# Patient Record
Sex: MALE | Race: BLACK OR AFRICAN AMERICAN | Employment: OTHER | URBAN - METROPOLITAN AREA
[De-identification: names, ages, dates, MRNs, and addresses within clinical notes are randomized per-mention and may not be internally consistent; named-entity substitution may affect disease eponyms.]

---

## 2017-02-15 ENCOUNTER — OP HISTORICAL/CONVERTED ENCOUNTER (OUTPATIENT)
Dept: OTHER | Age: 77
End: 2017-02-15

## 2017-03-09 ENCOUNTER — TELEPHONE (OUTPATIENT)
Dept: ENDOCRINOLOGY | Age: 77
End: 2017-03-09

## 2017-03-09 NOTE — TELEPHONE ENCOUNTER
You do know that we do not treat him for DM 2 right Love Bernardoivener     He has to come with meter, all meds etc., as it will be a consultation for new diagnosis diabetes

## 2017-03-09 NOTE — TELEPHONE ENCOUNTER
Pt. Wife called stating that he was taken to the hospital in February over night because his blood sugar dropped so low. She said that it went up to 200 and then over 400 this past week, but then will drop down to around 40 over night. He is scheduled for May 9th right now, but wife would like to know if he could come in sooner.

## 2017-03-21 NOTE — TELEPHONE ENCOUNTER
----- Message from Nury Salgado sent at 3/21/2017 12:17 PM EDT -----  Regarding: Dr. Raul Boyd  Pt is requesting a call back from the doctor to see where he can get a sleep study test done at. Pt wants to know is it through the doctor, or does he have to go somewhere else.  Pt can best be contacted at [phone]

## 2017-03-22 NOTE — TELEPHONE ENCOUNTER
Can he get it done at a [facility], or outside? Are you the one to order it, or does he need to get the order from his PCP??     He is scheduled for 3.27.17, if you would just like to address it then

## 2017-03-28 ENCOUNTER — OFFICE VISIT (OUTPATIENT)
Dept: ENDOCRINOLOGY | Age: 77
End: 2017-03-28

## 2017-03-28 ENCOUNTER — TELEPHONE (OUTPATIENT)
Dept: ENDOCRINOLOGY | Age: 77
End: 2017-03-28

## 2017-03-28 VITALS
OXYGEN SATURATION: 98 % | HEIGHT: 65 IN | SYSTOLIC BLOOD PRESSURE: 128 MMHG | TEMPERATURE: 96.6 F | WEIGHT: 142.2 LBS | HEART RATE: 65 BPM | RESPIRATION RATE: 18 BRPM | DIASTOLIC BLOOD PRESSURE: 75 MMHG | BODY MASS INDEX: 23.69 KG/M2

## 2017-03-28 DIAGNOSIS — E78.2 MIXED HYPERLIPIDEMIA: ICD-10-CM

## 2017-03-28 DIAGNOSIS — I10 ESSENTIAL HYPERTENSION: ICD-10-CM

## 2017-03-28 DIAGNOSIS — E04.9 GOITER: ICD-10-CM

## 2017-03-28 LAB
GLUCOSE POC: NORMAL MG/DL
HBA1C MFR BLD HPLC: 9.9 %

## 2017-03-28 RX ORDER — INSULIN ASPART 100 [IU]/ML
INJECTION, SOLUTION INTRAVENOUS; SUBCUTANEOUS
Qty: 10 ML | Refills: 0 | Status: SHIPPED | COMMUNITY
Start: 2017-03-28 | End: 2017-04-21 | Stop reason: ALTCHOICE

## 2017-03-28 RX ORDER — NATEGLINIDE 60 MG/1
60 TABLET ORAL
Qty: 90 TAB | Refills: 6 | Status: SHIPPED | OUTPATIENT
Start: 2017-03-28 | End: 2017-04-21 | Stop reason: ALTCHOICE

## 2017-03-28 RX ORDER — GLIMEPIRIDE 2 MG/1
2 TABLET ORAL
Qty: 30 TAB | Refills: 6 | Status: SHIPPED | OUTPATIENT
Start: 2017-03-28 | End: 2017-04-21 | Stop reason: ALTCHOICE

## 2017-03-28 RX ORDER — GLIMEPIRIDE 1 MG/1
1 TABLET ORAL
Qty: 30 TAB | Refills: 6 | Status: SHIPPED | OUTPATIENT
Start: 2017-03-28 | End: 2017-04-21 | Stop reason: ALTCHOICE

## 2017-03-28 NOTE — MR AVS SNAPSHOT
Visit Information Date & Time Provider Department Dept. Phone Encounter #  
 3/28/2017 11:30 AM Ovi Garcia MD [facility] Diabetes & Endocrinology [phone] [id number] Follow-up Instructions Return in about 4 weeks (around 4/25/2017). Upcoming Health Maintenance Date Due DTaP/Tdap/Td series (1 - Tdap) 11/13/2011 MEDICARE YEARLY EXAM 3/19/2016 HEMOGLOBIN A1C Q6M 6/4/2016 INFLUENZA AGE 9 TO ADULT 8/1/2016 EYE EXAM RETINAL OR DILATED Q1 9/8/2016 FOOT EXAM Q1 11/23/2016 MICROALBUMIN Q1 12/4/2016 LIPID PANEL Q1 12/4/2016 GLAUCOMA SCREENING Q2Y 9/8/2017 Pneumococcal 65+ Low/Medium Risk (2 of 2 - PPSV23) 11/1/2018 COLONOSCOPY 8/1/2024 Allergies as of 3/28/2017  Review Complete On: 3/28/2017 By: Ovi Garcia MD  
  
 Severity Noted Reaction Type Reactions Ace Inhibitors  04/20/2010   Side Effect Cough Current Immunizations  Reviewed on 4/15/2011 Name Date Influenza High Dose Vaccine PF 11/16/2015 Influenza Vaccine 10/1/2014, 11/1/2013 Pneumococcal Vaccine (Unspecified Type) 11/1/2013 Td 11/12/2011 Not reviewed this visit You Were Diagnosed With   
  
 Codes Comments Uncontrolled diabetes mellitus type 2 without complications, unspecified long term insulin use status ([facility]    -  Primary ICD-10-CM: E11.65 ICD-9-CM: 250.02 Vitals BP Pulse Temp Resp Height(growth percentile) Weight(growth percentile) 128/75 (BP 1 Location: Left arm, BP Patient Position: Sitting) 65 96.6 °F (35.9 °C) (Oral) 18 5' 5\" (1.651 m) 142 lb 3.2 oz (64.5 kg) SpO2 BMI Smoking Status 98% 23.66 kg/m2 Never Smoker BMI and BSA Data Body Mass Index Body Surface Area  
 23.66 kg/m 2 1.72 m 2 Preferred Pharmacy Pharmacy Name Phone [id number] [address] AT [place] [address] [phone] Your Updated Medication List  
  
   
 This list is accurate as of: 3/28/17  1:29 PM.  Always use your most recent med list.  
  
  
  
  
 folic acid 1 mg tablet Commonly known as:  Google Take 1 Tab by mouth daily. glucose blood VI test strips strip Commonly known as:  Ascensia CONTOUR Use as directed to test blood sugar 3 times daily - DX-Dm-250.00 Insulin Needles (Disposable) 29 gauge x 1/2\" Ndle Commonly known as:  PEN NEEDLE, DIABETIC  
1 Package by Does Not Apply route two (2) times a day. Lancets Misc Contour lancets- test blood sugar 3 times daily  
  
 losartan 50 mg tablet Commonly known as:  COZAAR Take 1 Tab by mouth daily. lovastatin 40 mg tablet Commonly known as:  MEVACOR  
TAKE 1 TABLET NIGHTLY  
  
 metFORMIN 1,000 mg tablet Commonly known as:  GLUCOPHAGE Take 1 Tab by mouth two (2) times a day. tamsulosin 0.4 mg capsule Commonly known as:  FLOMAX Take 1 Cap by mouth nightly. We Performed the Following AMB POC GLUCOSE, QUANTITATIVE, BLOOD [85699 CPT(R)] AMB POC HEMOGLOBIN A1C [95285 CPT(R)] Follow-up Instructions Return in about 4 weeks (around 4/25/2017). Patient Instructions Check blood sugars immediately before each meal and at bedtime Take  Amaryl   2+1  Mg   at bed time ( buy it ) Take Starlix insulin 60  Mg   before breakfast, before lunch and  before dinner. ( thru insurance ) Less than 70 mg NO medications for diabetes [address] Do not skip meals Do not eat in between meals Reduce carbs- pasta, rice, potatoes, bread Do not drink juices or sodas Donot eat peanut butter Do not eat sugar free cookies and cakes Do not eat peaches , oranges, pineapples, grapes and raisins Introducing [facility] & HEALTH SERVICES!    
 Gabriela King introduces [employer] patient portal. Now you can access parts of your medical record, email your doctor's office, and request medication refills online. 1. In your internet browser, go to [email or web address] 2. Click on the First Time User? Click Here link in the Sign In box. You will see the New Member Sign Up page. 3. Enter your Wizzgo Access Code exactly as it appears below. You will not need to use this code after youve completed the sign-up process. If you do not sign up before the expiration date, you must request a new code. · Wizzgo Access Code: [email or web address] Expires: 6/26/2017  1:29 PM 
 
4. Enter the last four digits of your Social Security Number (xxxx) and Date of Birth (mm/dd/yyyy) as indicated and click Submit. You will be taken to the next sign-up page. 5. Create a Wizzgo ID. This will be your Wizzgo login ID and cannot be changed, so think of one that is secure and easy to remember. 6. Create a Wizzgo password. You can change your password at any time. 7. Enter your Password Reset Question and Answer. This can be used at a later time if you forget your password. 8. Enter your e-mail address. You will receive e-mail notification when new information is available in [address] 9. Click Sign Up. You can now view and download portions of your medical record. 10. Click the Download Summary menu link to download a portable copy of your medical information. If you have questions, please visit the Frequently Asked Questions section of the Wizzgo website. Remember, Wizzgo is NOT to be used for urgent needs. For medical emergencies, dial 911. Now available from your iPhone and Android! Please provide this summary of care documentation to your next provider. Your primary care clinician is listed as Meliza Noe. If you have any questions after today's visit, please call [phone].

## 2017-03-28 NOTE — PATIENT INSTRUCTIONS
Check blood sugars immediately before each meal and at bedtime      Take  Amaryl   2+1  Mg   at bed time ( buy it )    Take Starlix insulin 60  Mg   before breakfast, before lunch and  before dinner.  ( thru insurance )     Less than 70 mg NO medications for diabetes          STOP  TOUJEO   AND   JARDIANCE   STOP ELAVIL         Do not skip meals  Do not eat in between meals    Reduce carbs- pasta, rice, potatoes, bread   Do not drink juices or sodas  Donot eat peanut butter     Do not eat sugar free cookies and cakes   Do not eat peaches , oranges, pineapples, grapes and raisins

## 2017-03-28 NOTE — PROGRESS NOTES
HISTORY OF PRESENT ILLNESS  Swetah Rock is a 78 y.o. male. HPI   Initial visit for DM  2  Management   Referred : by self    H/o diabetes for several  years   Accompanied by wife who is providing the history     Pt in general has drowsiness,   Per WIFE   He was given elavil 50 mg the night before the hospitalization  Current A1C is 9.9 % and symptoms/problems include polyuria, polydipsia and visual disturbances     Current diabetic medications include metformin. When discharged from hospital  On feb 16 2017  , he was told to take only Metformin 1 tab bid   He was told to not to take Toujeo  And jardiance     Current monitoring regimen: home blood tests - 3-4 times a day   Home blood sugar records: trend: fluctuating a lot  Any episodes of hypoglycemia? yes - multiple soon after Toujeo is taken   He has taken 40 units     Weight trend: stable  Prior visit with dietician: no  Current diet: \"unhealthy\" diet in general  Current exercise: no regular exercise    Known diabetic complications: peripheral neuropathy and cerebrovascular disease  Cardiovascular risk factors: dyslipidemia, diabetes mellitus, obesity, male gender, hypertension, stress    Eye exam current (within one year): yes  LUCIANA: no     Past Medical History:   Diagnosis Date    Cataract     left eye cataract surg    Chronic pain     copd     error    Diabetes ([address]     Hammer toe     HLD (hyperlipidemia) 4/22/2010    Hypertension     Other ill-defined conditions(799.89)     CLULITIS BILATERAL  FEET    Other ill-defined conditions(799.89)     BILATERAL GLAUCOMA     Past Surgical History:   Procedure Laterality Date    HX OTHER SURGICAL      CYSTS LT ARM FORHEAD AND RT UPPER QUADRANT    HX UVULOPALATOPHARYNGOPLASTY       Current Outpatient Prescriptions   Medication Sig    lovastatin (MEVACOR) 40 mg tablet TAKE 1 TABLET NIGHTLY    losartan (COZAAR) 50 mg tablet Take 1 Tab by mouth daily.     folic acid (FOLVITE) 1 mg tablet Take 1 Tab by mouth daily.  metFORMIN (GLUCOPHAGE) 1,000 mg tablet Take 1 Tab by mouth two (2) times a day.  glucose blood VI test strips (ASCENSIA CONTOUR) strip Use as directed to test blood sugar 3 times daily - DX-Dm-250.00    Lancets misc Contour lancets- test blood sugar 3 times daily    Insulin Needles, Disposable, (INSULIN PEN NEEDLE) 29 x 1/2 \" ndle 1 Package by Does Not Apply route two (2) times a day.  glimepiride (AMARYL) 2 mg tablet Take 1 Tab by mouth nightly.  glimepiride (AMARYL) 1 mg tablet Take 1 Tab by mouth nightly.  nateglinide (STARLIX) 60 mg tablet Take 1 Tab by mouth Before breakfast, lunch, and dinner.  insulin aspart (NOVOLOG) 100 unit/mL injection Sample    tamsulosin (FLOMAX) 0.4 mg capsule Take 1 Cap by mouth nightly. No current facility-administered medications for this visit. Review of Systems   Constitutional: Negative. HENT:        He does have swallowing difficulties    Eyes: Negative for pain and redness. Respiratory: Negative. Cardiovascular: Negative for chest pain, palpitations and leg swelling. Gastrointestinal: Negative. Negative for constipation. Genitourinary: Negative. Musculoskeletal: Negative for myalgias. Skin: Negative. Neurological: Negative. Endo/Heme/Allergies: Negative. Psychiatric/Behavioral: Negative for depression and memory loss. The patient does not have insomnia. Physical Exam   Constitutional: He is oriented to person, place, and time. He appears well-developed and well-nourished. HENT:   Head: Normocephalic. Eyes: Conjunctivae and EOM are normal. Pupils are equal, round, and reactive to light. Neck: Normal range of motion. Neck supple. No JVD present. No tracheal deviation present. Thyromegaly present. Cardiovascular: Normal rate, regular rhythm and normal heart sounds. Pulmonary/Chest: Effort normal and breath sounds normal.   Abdominal: Soft.  Bowel sounds are normal.   Musculoskeletal: Normal range of motion. Lymphadenopathy:     He has no cervical adenopathy. Neurological: He is alert and oriented to person, place, and time. He has normal reflexes. Skin: Skin is warm. Psychiatric: He has a normal mood and affect. ASSESSMENT and PLAN    1. Type 2 DM poorly controlled : a1c is  9.9 %   From today by fingerstick in office  Reviewed the glucose log : noticed hypoglycemias     STOPPING TOUJEO, will consider Levemir or Lantus for future   Starting on Amaryl and starlix , medication trial     Patient is advised to check blood sugars 4 times daily by rotation method. reviewed medications and side effects in detail  lab results and schedule of future lab studies reviewed with patient    specific diabetic recommendations: diabetic diet discussed in detail, written exchange diet given, low cholesterol diet, weight control and daily exercise discussed, home glucose monitoring emphasized, all medications, side effects and compliance discussed carefully, use and side effects of insulin is taught, foot care discussed and Podiatry visits discussed, annual eye examinations at Ophthalmology discussed, glycohemoglobin and other lab monitoring discussed and long term diabetic complications discussed    2. Hypoglycemia :  Educated on treating the hypoglycemia. 3. HTN : continue cozaar 50 mg . Patient is educated about importance of compliance with anti-hypertensives especially ARB/ACEI    4. Dyslipidemia : continue Mevacor 40 mg hs . Patient is educated about benefits and adverse effects of statins and explained how benefits outweigh risk. 5. use of aspirin to prevent MI and TIA's discussed      6.   Multi nodular goiter :   Thyroid usg - dec 2015 , Left side he has 3 nodules,  1.5 cm dominant nodule and then one mid level 1.2 cm and one 0.6 cm on upper pole   Right side upper pole it is 1.2 cm   He needs a f/u usg       > 50 % of time is spent on counseling

## 2017-03-28 NOTE — PROGRESS NOTES
Lab Results   Component Value Date/Time    Hemoglobin A1c 9.6 08/04/2015 06:59 AM    Hemoglobin A1c (POC) 8.4 11/23/2015 04:11 PM    Hemoglobin A1c, External 9.4 12/04/2015 11:36 AM     Wt Readings from Last 3 Encounters:   03/28/17 142 lb 3.2 oz (64.5 kg)   05/18/16 146 lb (66.2 kg)   03/09/16 152 lb (68.9 kg)     Temp Readings from Last 3 Encounters:   03/28/17 96.6 °F (35.9 °C) (Oral)   05/18/16 97.1 °F (36.2 °C) (Oral)   03/09/16 97 °F (36.1 °C) (Oral)     BP Readings from Last 3 Encounters:   03/28/17 128/75   05/18/16 128/61   03/09/16 127/63     Pulse Readings from Last 3 Encounters:   03/28/17 65   05/18/16 74   03/09/16 74   Foot Exam: yes  Eye Exam: Yes

## 2017-03-29 ENCOUNTER — TELEPHONE (OUTPATIENT)
Dept: ENDOCRINOLOGY | Age: 77
End: 2017-03-29

## 2017-03-29 NOTE — TELEPHONE ENCOUNTER
Patients wife called stated the medications given yesterday need to be called in per the pharmacist, can not purchase over the counter.  Thank you

## 2017-03-30 RX ORDER — LANCING DEVICE
EACH MISCELLANEOUS
Qty: 1 EACH | Refills: 1 | Status: SHIPPED | OUTPATIENT
Start: 2017-03-30

## 2017-04-03 NOTE — TELEPHONE ENCOUNTER
Pt called again again about prescriptions to go to [facility].  Not sure if PA or  Generic insulin is needed  Medicare

## 2017-04-04 NOTE — TELEPHONE ENCOUNTER
Patient says insulin has been changed. Patient says he is not sure name of medication. Please call patient with an update.

## 2017-04-14 ENCOUNTER — TELEPHONE (OUTPATIENT)
Dept: ENDOCRINOLOGY | Age: 77
End: 2017-04-14

## 2017-04-14 NOTE — TELEPHONE ENCOUNTER
Spoke with patient's wife (HIPPA verified). She stated when she went to get medications filled insurance was ran and was told was not able to buy any of the medication. She stated his blood sugars continued to be elevated even after speaking with us so he stopped taking Amaryl and Starlix and restarted Metformin and Toujeo 20 units in the morning and 20 units in the evening. His blood sugars are running very low now. BD 38 BB37 BL43. Wife stated she thinks the meter maybe wrong because he does not act like is BG is that low when it reads that low. She made an appointment for Friday April 21 at 1:30 and will be accompanying him.

## 2017-04-21 ENCOUNTER — OFFICE VISIT (OUTPATIENT)
Dept: ENDOCRINOLOGY | Age: 77
End: 2017-04-21

## 2017-04-21 ENCOUNTER — HOSPITAL ENCOUNTER (OUTPATIENT)
Dept: LAB | Age: 77
Discharge: HOME | End: 2017-04-21
Payer: MEDICARE

## 2017-04-21 VITALS
OXYGEN SATURATION: 99 % | SYSTOLIC BLOOD PRESSURE: 125 MMHG | BODY MASS INDEX: 24.49 KG/M2 | HEART RATE: 70 BPM | DIASTOLIC BLOOD PRESSURE: 63 MMHG | RESPIRATION RATE: 18 BRPM | HEIGHT: 65 IN | TEMPERATURE: 96.6 F | WEIGHT: 147 LBS

## 2017-04-21 DIAGNOSIS — E78.2 MIXED HYPERLIPIDEMIA: ICD-10-CM

## 2017-04-21 DIAGNOSIS — E11.65 TYPE 2 DIABETES MELLITUS WITH HYPERGLYCEMIA, UNSPECIFIED LONG TERM INSULIN USE STATUS: Primary | ICD-10-CM

## 2017-04-21 DIAGNOSIS — E04.9 GOITER: ICD-10-CM

## 2017-04-21 DIAGNOSIS — I10 ESSENTIAL HYPERTENSION: ICD-10-CM

## 2017-04-21 PROCEDURE — 83516 IMMUNOASSAY NONANTIBODY: CPT

## 2017-04-21 PROCEDURE — 83036 HEMOGLOBIN GLYCOSYLATED A1C: CPT

## 2017-04-21 PROCEDURE — 36415 COLL VENOUS BLD VENIPUNCTURE: CPT

## 2017-04-21 PROCEDURE — 80061 LIPID PANEL: CPT

## 2017-04-21 PROCEDURE — 84681 ASSAY OF C-PEPTIDE: CPT

## 2017-04-21 PROCEDURE — 80053 COMPREHEN METABOLIC PANEL: CPT

## 2017-04-21 RX ORDER — INSULIN GLARGINE 100 [IU]/ML
INJECTION, SOLUTION SUBCUTANEOUS
Qty: 15 ML | Refills: 6 | Status: SHIPPED | OUTPATIENT
Start: 2017-04-21 | End: 2017-11-28 | Stop reason: SDUPTHER

## 2017-04-21 RX ORDER — INSULIN LISPRO 100 [IU]/ML
INJECTION, SOLUTION INTRAVENOUS; SUBCUTANEOUS
Qty: 15 ML | Refills: 6 | Status: SHIPPED | OUTPATIENT
Start: 2017-04-21 | End: 2017-11-28 | Stop reason: SDUPTHER

## 2017-04-21 RX ORDER — IBUPROFEN 800 MG/1
TABLET ORAL
Refills: 3 | Status: ON HOLD | COMMUNITY
Start: 2017-02-04 | End: 2017-08-31

## 2017-04-21 RX ORDER — INSULIN GLARGINE 300 U/ML
20 INJECTION, SOLUTION SUBCUTANEOUS DAILY
Refills: 3 | COMMUNITY
Start: 2017-03-09 | End: 2017-04-21 | Stop reason: ALTCHOICE

## 2017-04-21 RX ORDER — OMEPRAZOLE 40 MG/1
CAPSULE, DELAYED RELEASE ORAL
Refills: 3 | COMMUNITY
Start: 2017-02-27

## 2017-04-21 NOTE — MR AVS SNAPSHOT
Visit Information Date & Time Provider Department Dept. Phone Encounter #  
 4/21/2017  1:30 PM Carmel Moss MD [facility] Diabetes & Endocrinology [phone] [id number] Upcoming Health Maintenance Date Due DTaP/Tdap/Td series (1 - Tdap) 11/13/2011 MEDICARE YEARLY EXAM 3/19/2016 INFLUENZA AGE 9 TO ADULT 8/1/2016 EYE EXAM RETINAL OR DILATED Q1 9/8/2016 FOOT EXAM Q1 11/23/2016 MICROALBUMIN Q1 12/4/2016 LIPID PANEL Q1 12/4/2016 GLAUCOMA SCREENING Q2Y 9/8/2017 HEMOGLOBIN A1C Q6M 9/28/2017 Pneumococcal 65+ Low/Medium Risk (2 of 2 - PPSV23) 11/1/2018 COLONOSCOPY 8/1/2024 Allergies as of 4/21/2017  Review Complete On: 4/21/2017 By: Carmel Moss MD  
  
 Severity Noted Reaction Type Reactions Ace Inhibitors  04/20/2010   Side Effect Cough Current Immunizations  Reviewed on 4/15/2011 Name Date Influenza High Dose Vaccine PF 11/16/2015 Influenza Vaccine 10/1/2014, 11/1/2013 Pneumococcal Vaccine (Unspecified Type) 11/1/2013 Td 11/12/2011 Not reviewed this visit You Were Diagnosed With   
  
 Codes Comments Type 2 diabetes mellitus with hyperglycemia, unspecified long term insulin use status    -  Primary ICD-10-CM: E11.65 ICD-9-CM: 250.00 Vitals BP Pulse Temp Resp Height(growth percentile) Weight(growth percentile) 125/63 70 96.6 °F (35.9 °C) (Oral) 18 5' 5\" (1.651 m) 147 lb (66.7 kg) SpO2 BMI Smoking Status 99% 24.46 kg/m2 Never Smoker BMI and BSA Data Body Mass Index Body Surface Area  
 24.46 kg/m 2 1.75 m 2 Preferred Pharmacy Pharmacy Name Phone [id number] [address] AT [place] [address] [phone] Your Updated Medication List  
  
   
This list is accurate as of: 4/21/17  2:23 PM.  Always use your most recent med list.  
  
  
  
  
 folic acid 1 mg tablet Commonly known as:  Google  
 Take 1 Tab by mouth daily. glucose blood VI test strips strip Commonly known as:  Ascensia CONTOUR Use as directed to test blood sugar 3 times daily - DX-Dm-250.00  
  
 ibuprofen 800 mg tablet Commonly known as:  MOTRIN  
TAKE 1 TABLET THREE TIMES A DAY ORALLY 30 DAY(S) Insulin Needles (Disposable) 29 gauge x 1/2\" Ndle Commonly known as:  PEN NEEDLE, DIABETIC  
1 Package by Does Not Apply route two (2) times a day. Lancets Misc Contour lancets- test blood sugar 3 times daily Lancing Device Misc To use daily Dx Code E11.65  
  
 losartan 50 mg tablet Commonly known as:  COZAAR Take 1 Tab by mouth daily. lovastatin 40 mg tablet Commonly known as:  MEVACOR  
TAKE 1 TABLET NIGHTLY  
  
 metFORMIN 1,000 mg tablet Commonly known as:  GLUCOPHAGE Take 1 Tab by mouth two (2) times a day. omeprazole 40 mg capsule Commonly known as:  PRILOSEC  
TAKE 1 CAPSULE BY MOUTH EVERYDAY  
  
 tamsulosin 0.4 mg capsule Commonly known as:  FLOMAX Take 1 Cap by mouth nightly. We Performed the Following C-PEPTIDE [id number] CPT(R)] GLUCOSE, RANDOM [id number] CPT(R)] To-Do List   
 04/21/2017 Lab:  GLUTAMIC ACID DECARB AB Patient Instructions STOP STARLIX   AND     AMARYL     AND     jardiance Check blood sugars immediately before each meal and at bedtime Take  LANTUS  insulin  20  units  After  b-fast  
 
Take Humalog  insulin 3  units before breakfast, 3 units before lunch and 3 units before dinner  When sugars are between 90 to 150 mg Take  humalog  as follows with meals  If blood sugars are[de-identified] 
 
150-200 mg 4 units 201-250 mg 5 units 251-300 mg 6 units 301-350 mg 7 units 351-400 mg 8 units 401-450 mg 9 units 451-500 mg 10 units Less than 90 mg NO INSULIN Introducing [facility] & HEALTH SERVICES!    
 [employer] introduces [employer] patient portal. Now you can access parts of your medical record, email your doctor's office, and request medication refills online. 1. In your internet browser, go to [email or web address] 2. Click on the First Time User? Click Here link in the Sign In box. You will see the New Member Sign Up page. 3. Enter your [employer] Access Code exactly as it appears below. You will not need to use this code after youve completed the sign-up process. If you do not sign up before the expiration date, you must request a new code. · [employer] Access Code: [email or web address] Expires: 6/26/2017  1:29 PM 
 
4. Enter the last four digits of your Social Security Number (xxxx) and Date of Birth (mm/dd/yyyy) as indicated and click Submit. You will be taken to the next sign-up page. 5. Create a [employer] ID. This will be your [employer] login ID and cannot be changed, so think of one that is secure and easy to remember. 6. Create a [employer] password. You can change your password at any time. 7. Enter your Password Reset Question and Answer. This can be used at a later time if you forget your password. 8. Enter your e-mail address. You will receive e-mail notification when new information is available in [address] 9. Click Sign Up. You can now view and download portions of your medical record. 10. Click the Download Summary menu link to download a portable copy of your medical information. If you have questions, please visit the Frequently Asked Questions section of the [employer] website. Remember, [employer] is NOT to be used for urgent needs. For medical emergencies, dial 911. Now available from your iPhone and Android! Please provide this summary of care documentation to your next provider. Your primary care clinician is listed as Kevin Noe. If you have any questions after today's visit, please call [phone].

## 2017-04-21 NOTE — PATIENT INSTRUCTIONS
STOP STARLIX   AND     AMARYL     AND     jardiance         Check blood sugars immediately before each meal and at bedtime      Take  LANTUS  insulin  20  units  After  b-fast     Take Humalog  insulin 3  units before breakfast, 3 units before lunch and 3 units before dinner  When sugars are between 90 to 150 mg         Take  humalog  as follows with meals  If blood sugars are[de-identified]    150-200 mg 4 units    201-250 mg 5 units    251-300 mg 6 units    301-350 mg 7 units    351-400 mg 8 units    401-450 mg 9 units    451-500 mg 10 units     Less than 90 mg NO INSULIN

## 2017-04-21 NOTE — PROGRESS NOTES
HISTORY OF PRESENT ILLNESS  Rock Thompson is a 78 y.o. male. HPI  First f/u visit after initial visit for DM  2  Management   From march 28 2017       accompanied by wife     In the interim, patient had to be helped over the phone quite a bit in managing his diabetes   Discussed with wife over phone after he had an AM low sugar of 37 mg     He resumed insulin on his own at 40 units . He is here with log     Prior history   Referred : by self    H/o diabetes for several  years   Accompanied by wife who is providing the history     Pt in general has drowsiness,   Per WIFE   He was given elavil 50 mg the night before the hospitalization  Current A1C is 9.9 % and symptoms/problems include polyuria, polydipsia and visual disturbances     Current diabetic medications include metformin. When discharged from hospital  On feb 16 2017  , he was told to take only Metformin 1 tab bid   He was told to not to take Toujeo  And jardiance     Current monitoring regimen: home blood tests - 3-4 times a day   Home blood sugar records: trend: fluctuating a lot  Any episodes of hypoglycemia?  yes - multiple soon after Toujeo is taken   He has taken 40 units     Weight trend: stable  Prior visit with dietician: no  Current diet: \"unhealthy\" diet in general  Current exercise: no regular exercise    Known diabetic complications: peripheral neuropathy and cerebrovascular disease  Cardiovascular risk factors: dyslipidemia, diabetes mellitus, obesity, male gender, hypertension, stress    Eye exam current (within one year): yes  LUCIANA: no     Past Medical History:   Diagnosis Date    Cataract     left eye cataract surg    Chronic pain     copd     error    Diabetes ([address]     Hammer toe     HLD (hyperlipidemia) 4/22/2010    Hypertension     Other ill-defined conditions     CLULITIS BILATERAL  FEET    Other ill-defined conditions     BILATERAL GLAUCOMA     Past Surgical History:   Procedure Laterality Date    HX OTHER SURGICAL CYSTS LT ARM FORHEAD AND RT UPPER QUADRANT    HX UVULOPALATOPHARYNGOPLASTY       Current Outpatient Prescriptions   Medication Sig    ibuprofen (MOTRIN) 800 mg tablet TAKE 1 TABLET THREE TIMES A DAY ORALLY 30 DAY(S)    omeprazole (PRILOSEC) 40 mg capsule TAKE 1 CAPSULE BY MOUTH EVERYDAY    TOUJEO SOLOSTAR 300 unit/mL (1.5 mL) inpn 20 Units by SubCUTAneous route daily.  Lancing Device misc To use daily Dx Code E11.65    tamsulosin (FLOMAX) 0.4 mg capsule Take 1 Cap by mouth nightly.  lovastatin (MEVACOR) 40 mg tablet TAKE 1 TABLET NIGHTLY    losartan (COZAAR) 50 mg tablet Take 1 Tab by mouth daily.  folic acid (FOLVITE) 1 mg tablet Take 1 Tab by mouth daily.  metFORMIN (GLUCOPHAGE) 1,000 mg tablet Take 1 Tab by mouth two (2) times a day.  glucose blood VI test strips (ASCENSIA CONTOUR) strip Use as directed to test blood sugar 3 times daily - DX-Dm-250.00    Lancets misc Contour lancets- test blood sugar 3 times daily    Insulin Needles, Disposable, (INSULIN PEN NEEDLE) 29 x 1/2 \" ndle 1 Package by Does Not Apply route two (2) times a day.  glimepiride (AMARYL) 2 mg tablet Take 1 Tab by mouth nightly.  glimepiride (AMARYL) 1 mg tablet Take 1 Tab by mouth nightly.  nateglinide (STARLIX) 60 mg tablet Take 1 Tab by mouth Before breakfast, lunch, and dinner.  insulin aspart (NOVOLOG) 100 unit/mL injection Sample     No current facility-administered medications for this visit. Review of Systems   Constitutional: Negative. HENT:        He does have swallowing difficulties    Eyes: Negative for pain and redness. Respiratory: Negative. Cardiovascular: Negative for chest pain, palpitations and leg swelling. Gastrointestinal: Negative. Negative for constipation. Genitourinary: Negative. Musculoskeletal: Negative for myalgias. Skin: Negative. Neurological: Negative. Endo/Heme/Allergies: Negative.     Psychiatric/Behavioral: Negative for depression and memory loss. The patient does not have insomnia. Physical Exam   Constitutional: He is oriented to person, place, and time. He appears well-developed and well-nourished. HENT:   Head: Normocephalic. Eyes: Conjunctivae and EOM are normal. Pupils are equal, round, and reactive to light. Neck: Normal range of motion. Neck supple. No JVD present. No tracheal deviation present. Thyromegaly present. Cardiovascular: Normal rate, regular rhythm and normal heart sounds. Pulmonary/Chest: Effort normal and breath sounds normal.   Abdominal: Soft. Bowel sounds are normal.   Musculoskeletal: Normal range of motion. Lymphadenopathy:     He has no cervical adenopathy. Neurological: He is alert and oriented to person, place, and time. He has normal reflexes. Skin: Skin is warm. Psychiatric: He has a normal mood and affect. ASSESSMENT and PLAN    1. Type 2 DM poorly controlled : a1c is  9.9 %   From march 2017    Reviewed the glucose log     After careful discussion with pt and his wife, wife assured me that she will help through the process   Starting on  Lantus in AM, stop Toujeo       Also, started on humalog and very easy to follow sliding scale   Explained the regimen     Safety emphasized     Toujeo did nto suit him at all ( need renal function)  Stop Amaryl and starlix  Failed  medication trial     Patient is advised to check blood sugars 4 times daily by rotation method. reviewed medications and side effects in detail  lab results and schedule of future lab studies reviewed with patient        2. Hypoglycemia :  Educated on treating the hypoglycemia. No inadvertant use of insulin, wife clarifies that he takes insulin whenever     3. HTN : continue cozaar 50 mg . Patient is educated about importance of compliance with anti-hypertensives especially ARB/ACEI    4. Dyslipidemia : continue Mevacor 40 mg hs .  Patient is educated about benefits and adverse effects of statins and explained how benefits outweigh risk. 5. use of aspirin to prevent MI and TIA's discussed      6.   Multi nodular goiter :   Thyroid usg - dec 2015 , Left side he has 3 nodules,  1.5 cm dominant nodule and then one mid level 1.2 cm and one 0.6 cm on upper pole   Right side upper pole it is 1.2 cm   He needs a f/u usg       > 50 % of time is spent on counseling

## 2017-04-21 NOTE — PROGRESS NOTES
Wt Readings from Last 3 Encounters:   04/21/17 147 lb (66.7 kg)   03/28/17 142 lb 3.2 oz (64.5 kg)   05/18/16 146 lb (66.2 kg)     Temp Readings from Last 3 Encounters:   04/21/17 96.6 °F (35.9 °C) (Oral)   03/28/17 96.6 °F (35.9 °C) (Oral)   05/18/16 97.1 °F (36.2 °C) (Oral)     BP Readings from Last 3 Encounters:   04/21/17 125/63   03/28/17 128/75   05/18/16 128/61     Pulse Readings from Last 3 Encounters:   04/21/17 70   03/28/17 65   05/18/16 74     Lab Results   Component Value Date/Time    Hemoglobin A1c 9.6 08/04/2015 06:59 AM    Hemoglobin A1c (POC) 9.9 03/28/2017 12:13 PM    Hemoglobin A1c, External 9.4 12/04/2015 11:36 AM

## 2017-04-25 ENCOUNTER — HOSPITAL ENCOUNTER (OUTPATIENT)
Dept: LAB | Age: 77
Discharge: HOME | End: 2017-04-25
Payer: MEDICARE

## 2017-04-25 PROCEDURE — 82043 UR ALBUMIN QUANTITATIVE: CPT

## 2017-04-26 LAB
ALBUMIN SERPL-MCNC: 4.2 G/DL (ref 3.5–4.8)
ALBUMIN/CREAT UR: 40.7 MG/G CREAT (ref 0–30)
ALBUMIN/GLOB SERPL: 2.1 {RATIO} (ref 1.2–2.2)
ALP SERPL-CCNC: 70 IU/L (ref 39–117)
ALT SERPL-CCNC: 24 IU/L (ref 0–44)
AST SERPL-CCNC: 25 IU/L (ref 0–40)
BILIRUB SERPL-MCNC: <0.2 MG/DL (ref 0–1.2)
BUN SERPL-MCNC: 17 MG/DL (ref 8–27)
BUN/CREAT SERPL: 18 (ref 10–24)
C PEPTIDE SERPL-MCNC: 1.3 NG/ML (ref 1.1–4.4)
CALCIUM SERPL-MCNC: 9.7 MG/DL (ref 8.6–10.2)
CHLORIDE SERPL-SCNC: 98 MMOL/L (ref 96–106)
CHOLEST SERPL-MCNC: 131 MG/DL (ref 100–199)
CO2 SERPL-SCNC: 25 MMOL/L (ref 18–29)
CREAT SERPL-MCNC: 0.96 MG/DL (ref 0.76–1.27)
CREAT UR-MCNC: 87.2 MG/DL
EST. AVERAGE GLUCOSE BLD GHB EST-MCNC: 269 MG/DL
GAD65 AB SER IA-ACNC: <5 U/ML (ref 0–5)
GLOBULIN SER CALC-MCNC: 2 G/DL (ref 1.5–4.5)
GLUCOSE SERPL-MCNC: 250 MG/DL (ref 65–99)
HBA1C MFR BLD: 11 % (ref 4.8–5.6)
HDLC SERPL-MCNC: 68 MG/DL
INTERPRETATION, 910389: NORMAL
LDLC SERPL CALC-MCNC: 41 MG/DL (ref 0–99)
Lab: NORMAL
Lab: NORMAL
MICROALBUMIN UR-MCNC: 35.5 UG/ML
POTASSIUM SERPL-SCNC: 5.2 MMOL/L (ref 3.5–5.2)
PROT SERPL-MCNC: 6.2 G/DL (ref 6–8.5)
SODIUM SERPL-SCNC: 137 MMOL/L (ref 134–144)
TRIGL SERPL-MCNC: 109 MG/DL (ref 0–149)
VLDLC SERPL CALC-MCNC: 22 MG/DL (ref 5–40)

## 2017-05-09 ENCOUNTER — OFFICE VISIT (OUTPATIENT)
Dept: ENDOCRINOLOGY | Age: 77
End: 2017-05-09

## 2017-05-09 VITALS
HEART RATE: 62 BPM | TEMPERATURE: 96.6 F | DIASTOLIC BLOOD PRESSURE: 63 MMHG | HEIGHT: 65 IN | SYSTOLIC BLOOD PRESSURE: 115 MMHG | RESPIRATION RATE: 18 BRPM | WEIGHT: 146.4 LBS | BODY MASS INDEX: 24.39 KG/M2 | OXYGEN SATURATION: 100 %

## 2017-05-09 DIAGNOSIS — I10 ESSENTIAL HYPERTENSION: ICD-10-CM

## 2017-05-09 DIAGNOSIS — E78.2 MIXED HYPERLIPIDEMIA: ICD-10-CM

## 2017-05-09 DIAGNOSIS — Z79.4 TYPE 2 DIABETES MELLITUS WITH HYPERGLYCEMIA, WITH LONG-TERM CURRENT USE OF INSULIN (HCC): Primary | ICD-10-CM

## 2017-05-09 DIAGNOSIS — E11.65 TYPE 2 DIABETES MELLITUS WITH HYPERGLYCEMIA, WITH LONG-TERM CURRENT USE OF INSULIN (HCC): Primary | ICD-10-CM

## 2017-05-09 RX ORDER — ASPIRIN 81 MG/1
TABLET ORAL DAILY
COMMUNITY

## 2017-05-09 NOTE — PROGRESS NOTES
Wt Readings from Last 3 Encounters:   05/09/17 146 lb 6.4 oz (66.4 kg)   04/21/17 147 lb (66.7 kg)   03/28/17 142 lb 3.2 oz (64.5 kg)     Temp Readings from Last 3 Encounters:   05/09/17 96.6 °F (35.9 °C) (Oral)   04/21/17 96.6 °F (35.9 °C) (Oral)   03/28/17 96.6 °F (35.9 °C) (Oral)     BP Readings from Last 3 Encounters:   05/09/17 115/63   04/21/17 125/63   03/28/17 128/75     Pulse Readings from Last 3 Encounters:   05/09/17 62   04/21/17 70   03/28/17 65     Lab Results   Component Value Date/Time    Hemoglobin A1c 11.0 04/21/2017 02:37 PM    Hemoglobin A1c (POC) 9.9 03/28/2017 12:13 PM    Hemoglobin A1c, External 9.4 12/04/2015 11:36 AM   Foot Exam: 05/8/2017  Eye Exam: yes  Patient stated not taking metformin at this time. Unsure if he was supposed to continue medication.

## 2017-05-09 NOTE — TELEPHONE ENCOUNTER
Left message for patient to return call to discuss message below. Patient is requesting medication that is not prescribed by doctors here at [facility]. Patient has not been seen by [facility] since 2015.  Needs to get his diabetes prescriptions from endocrinologist.

## 2017-05-09 NOTE — PROGRESS NOTES
HISTORY OF PRESENT ILLNESS  Dai Jaimes is a 78 y.o. male. HPI  First f/u visit after last visit for DM  2  Management   From April 21 2017       accompanied by wife     No more low sugars   Some high sugars from eating snacks in between meals     He is here with log     Prior history   Referred : by self    H/o diabetes for several  years   Accompanied by wife who is providing the history     Pt in general has drowsiness,   Per WIFE   He was given elavil 50 mg the night before the hospitalization  Current A1C is 9.9 % and symptoms/problems include polyuria, polydipsia and visual disturbances     Current diabetic medications include metformin. When discharged from hospital  On feb 16 2017  , he was told to take only Metformin 1 tab bid   He was told to not to take Toujeo  And jardiance     Current monitoring regimen: home blood tests - 3-4 times a day   Home blood sugar records: trend: fluctuating a lot  Any episodes of hypoglycemia?  yes - multiple soon after Toujeo is taken   He has taken 40 units     Weight trend: stable  Prior visit with dietician: no  Current diet: \"unhealthy\" diet in general  Current exercise: no regular exercise    Known diabetic complications: peripheral neuropathy and cerebrovascular disease  Cardiovascular risk factors: dyslipidemia, diabetes mellitus, obesity, male gender, hypertension, stress    Eye exam current (within one year): yes  LUCIANA: no     Past Medical History:   Diagnosis Date    Cataract     left eye cataract surg    Chronic pain     copd     error    Diabetes ([address]     Hammer toe     HLD (hyperlipidemia) 4/22/2010    Hypertension     Other ill-defined conditions     CLULITIS BILATERAL  FEET    Other ill-defined conditions     BILATERAL GLAUCOMA     Past Surgical History:   Procedure Laterality Date    HX OTHER SURGICAL      CYSTS LT ARM FORHEAD AND RT UPPER QUADRANT    HX UVULOPALATOPHARYNGOPLASTY       Current Outpatient Prescriptions   Medication Sig    aspirin delayed-release 81 mg tablet Take  by mouth daily.  ibuprofen (MOTRIN) 800 mg tablet TAKE 1 TABLET THREE TIMES A DAY ORALLY 30 DAY(S)    omeprazole (PRILOSEC) 40 mg capsule TAKE 1 CAPSULE BY MOUTH EVERYDAY    insulin glargine (LANTUS SOLOSTAR) 100 unit/mL (3 mL) pen Inject 20 units daily after breakfast.    insulin lispro (HUMALOG) 100 unit/mL kwikpen Inject 3 units before breakfast, lunch, and dinner. Plus sliding scale    Lancing Device misc To use daily Dx Code E11.65    tamsulosin (FLOMAX) 0.4 mg capsule Take 1 Cap by mouth nightly.  lovastatin (MEVACOR) 40 mg tablet TAKE 1 TABLET NIGHTLY    losartan (COZAAR) 50 mg tablet Take 1 Tab by mouth daily.  folic acid (FOLVITE) 1 mg tablet Take 1 Tab by mouth daily.  glucose blood VI test strips (ASCENSIA CONTOUR) strip Use as directed to test blood sugar 3 times daily - DX-Dm-250.00    Lancets misc Contour lancets- test blood sugar 3 times daily    Insulin Needles, Disposable, (INSULIN PEN NEEDLE) 29 x 1/2 \" ndle 1 Package by Does Not Apply route two (2) times a day.  metFORMIN (GLUCOPHAGE) 1,000 mg tablet Take 1 Tab by mouth two (2) times a day. No current facility-administered medications for this visit. Review of Systems   Constitutional: Negative. HENT:        He does have swallowing difficulties    Eyes: Negative for pain and redness. Respiratory: Negative. Cardiovascular: Negative for chest pain, palpitations and leg swelling. Gastrointestinal: Negative. Negative for constipation. Genitourinary: Negative. Musculoskeletal: Negative for myalgias. Skin: Negative. Neurological: Negative. Endo/Heme/Allergies: Negative. Psychiatric/Behavioral: Negative for depression and memory loss. The patient does not have insomnia. Physical Exam   Constitutional: He is oriented to person, place, and time. He appears well-developed and well-nourished. HENT:   Head: Normocephalic.    Eyes: Conjunctivae and EOM are normal. Pupils are equal, round, and reactive to light. Neck: Normal range of motion. Neck supple. No JVD present. No tracheal deviation present. Thyromegaly present. Cardiovascular: Normal rate, regular rhythm and normal heart sounds. Pulmonary/Chest: Effort normal and breath sounds normal.   Abdominal: Soft. Bowel sounds are normal.   Musculoskeletal: Normal range of motion. Lymphadenopathy:     He has no cervical adenopathy. Neurological: He is alert and oriented to person, place, and time. He has normal reflexes. Skin: Skin is warm. Psychiatric: He has a normal mood and affect. ASSESSMENT and PLAN    1. Type 2 DM poorly controlled : a1c is  11 %    From  April 2017    compared to   9.9 %   From march 2017    Reviewed the glucose log     After careful discussion with pt and his wife, wife assured me that she will help through the process   Continuing   on  Lantus in AM, stop Toujeo       Also, started on humalog and very easy to follow sliding scale   Explained the regimen     Safety emphasized     Toujeo did nto suit him at all ( need renal function)  Stop Amaryl and starlix  Failed  medication trial     Patient is advised to check blood sugars 4 times daily by rotation method. reviewed medications and side effects in detail  lab results and schedule of future lab studies reviewed with patient        2. Hypoglycemia :  Educated on treating the hypoglycemia. No inadvertant use of insulin, wife clarifies that he takes insulin whenever     3. HTN : continue cozaar 50 mg . Patient is educated about importance of compliance with anti-hypertensives especially ARB/ACEI    4. Dyslipidemia : continue Mevacor 40 mg hs . Patient is educated about benefits and adverse effects of statins and explained how benefits outweigh risk. 5. use of aspirin to prevent MI and TIA's discussed      6.   Multi nodular goiter :   Thyroid usg - dec 2015 , Left side he has 3 nodules,  1.5 cm dominant nodule and then one mid level 1.2 cm and one 0.6 cm on upper pole   Right side upper pole it is 1.2 cm   He needs a f/u usg       > 50 % of time is spent on counseling

## 2017-05-09 NOTE — PATIENT INSTRUCTIONS
STOP STARLIX   AND     AMARYL     AND     jardiance     Stay on metformin 1000 mg twice a day with meals     Check blood sugars immediately before each meal and at bedtime      Take  LANTUS  insulin  20  units  After  b-fast     Take Humalog  insulin 3  units before breakfast, 3 units before lunch and 3 units before dinner  When sugars are between 90 to 150 mg         Take  humalog  as follows with meals  If blood sugars are[de-identified]    150-200 mg 4 units    201-250 mg 5 units    251-300 mg 6 units    301-350 mg 7 units    351-400 mg 8 units    401-450 mg 9 units    451-500 mg 10 units     Less than 90 mg NO INSULIN

## 2017-05-09 NOTE — TELEPHONE ENCOUNTER
Patient's insurance won't cover the medication Metformin and Glyburide together so a separate prescription for Metformin and a separate prescription for Glyburide needs to be sent to the [facility] on [address] [facility] [place]. Their number is [phone].      Message received & copied from [facility]

## 2017-05-09 NOTE — MR AVS SNAPSHOT
Visit Information Date & Time Provider Department Dept. Phone Encounter #  
 5/9/2017  2:45 PM Desiree Forrest MD [facility] [phone] [id number] Follow-up Instructions Return in about 4 weeks (around 6/6/2017). Your Appointments 6/29/2017  1:15 PM  
ROUTINE CARE with Desiree Forrest MD  
[facility]) Appt Note: f/u 2 month  
 [address] [place] [address] [address] [facility] [address]  
[phone]  
  
   
 [address] [place] [id number] Upcoming Health Maintenance Date Due DTaP/Tdap/Td series (1 - Tdap) 11/13/2011 MEDICARE YEARLY EXAM 3/19/2016 EYE EXAM RETINAL OR DILATED Q1 9/8/2016 FOOT EXAM Q1 11/23/2016 INFLUENZA AGE 9 TO ADULT 8/1/2017 GLAUCOMA SCREENING Q2Y 9/8/2017 HEMOGLOBIN A1C Q6M 10/21/2017 LIPID PANEL Q1 4/21/2018 MICROALBUMIN Q1 4/25/2018 Pneumococcal 65+ Low/Medium Risk (2 of 2 - PPSV23) 11/1/2018 COLONOSCOPY 8/1/2024 Allergies as of 5/9/2017  Review Complete On: 5/9/2017 By: Desiree Forrest MD  
  
 Severity Noted Reaction Type Reactions Ace Inhibitors  04/20/2010   Side Effect Cough Current Immunizations  Reviewed on 4/15/2011 Name Date Influenza High Dose Vaccine PF 11/16/2015 Influenza Vaccine 10/1/2014, 11/1/2013 Pneumococcal Vaccine (Unspecified Type) 11/1/2013 Td 11/12/2011 Not reviewed this visit Vitals BP Pulse Temp Resp Height(growth percentile) Weight(growth percentile)  
 115/63 (BP 1 Location: Right arm, BP Patient Position: Sitting) 62 96.6 °F (35.9 °C) (Oral) 18 5' 5\" (1.651 m) 146 lb 6.4 oz (66.4 kg) SpO2 BMI Smoking Status 100% 24.36 kg/m2 Never Smoker BMI and BSA Data Body Mass Index Body Surface Area  
 24.36 kg/m 2 1.75 m 2 Preferred Pharmacy Pharmacy Name Phone  [id number]- [place], [place] - [address] [facility] [address] [phone] Your Updated Medication List  
  
   
This list is accurate as of: 5/9/17  3:35 PM.  Always use your most recent med list.  
  
  
  
  
 aspirin delayed-release 81 mg tablet Take  by mouth daily. folic acid 1 mg tablet Commonly known as:  Google Take 1 Tab by mouth daily. glucose blood VI test strips strip Commonly known as:  Ascensia CONTOUR Use as directed to test blood sugar 3 times daily - DX-Dm-250.00  
  
 ibuprofen 800 mg tablet Commonly known as:  MOTRIN  
TAKE 1 TABLET THREE TIMES A DAY ORALLY 30 DAY(S)  
  
 insulin glargine 100 unit/mL (3 mL) pen Commonly known as:  LANTUS SOLOSTAR Inject 20 units daily after breakfast.  
  
 insulin lispro 100 unit/mL kwikpen Commonly known as:  HUMALOG Inject 3 units before breakfast, lunch, and dinner. Plus sliding scale Insulin Needles (Disposable) 29 gauge x 1/2\" Ndle Commonly known as:  PEN NEEDLE, DIABETIC  
1 Package by Does Not Apply route two (2) times a day. Lancets Misc Contour lancets- test blood sugar 3 times daily Lancing Device Misc To use daily Dx Code E11.65  
  
 losartan 50 mg tablet Commonly known as:  COZAAR Take 1 Tab by mouth daily. lovastatin 40 mg tablet Commonly known as:  MEVACOR  
TAKE 1 TABLET NIGHTLY  
  
 metFORMIN 1,000 mg tablet Commonly known as:  GLUCOPHAGE Take 1 Tab by mouth two (2) times a day. omeprazole 40 mg capsule Commonly known as:  PRILOSEC  
TAKE 1 CAPSULE BY MOUTH EVERYDAY  
  
 tamsulosin 0.4 mg capsule Commonly known as:  FLOMAX Take 1 Cap by mouth nightly. Follow-up Instructions Return in about 4 weeks (around 6/6/2017). Patient Instructions STOP STARLIX   AND     AMARYL     AND     jardiance Stay on metformin 1000 mg twice a day with meals Check blood sugars immediately before each meal and at bedtime Take  LANTUS  insulin  20  units  After  b-fast  
 
Take Humalog  insulin 3  units before breakfast, 3 units before lunch and 3 units before dinner  When sugars are between 90 to 150 mg Take  humalog  as follows with meals  If blood sugars are[de-identified] 
 
150-200 mg 4 units 201-250 mg 5 units 251-300 mg 6 units 301-350 mg 7 units 351-400 mg 8 units 401-450 mg 9 units 451-500 mg 10 units Less than 90 mg NO INSULIN Introducing [facility] SERVICES! [facility] introduces [employer] patient portal. Now you can access parts of your medical record, email your doctor's office, and request medication refills online. 1. In your internet browser, go to [email or web address] 2. Click on the First Time User? Click Here link in the Sign In box. You will see the New Member Sign Up page. 3. Enter your [employer] Access Code exactly as it appears below. You will not need to use this code after youve completed the sign-up process. If you do not sign up before the expiration date, you must request a new code. · [employer] Access Code: [email or web address] Expires: 6/26/2017  1:29 PM 
 
4. Enter the last four digits of your Social Security Number (xxxx) and Date of Birth (mm/dd/yyyy) as indicated and click Submit. You will be taken to the next sign-up page. 5. Create a [employer] ID. This will be your [employer] login ID and cannot be changed, so think of one that is secure and easy to remember. 6. Create a [employer] password. You can change your password at any time. 7. Enter your Password Reset Question and Answer. This can be used at a later time if you forget your password. 8. Enter your e-mail address. You will receive e-mail notification when new information is available in [address] 9. Click Sign Up. You can now view and download portions of your medical record. 10. Click the Download Summary menu link to download a portable copy of your medical information. If you have questions, please visit the Frequently Asked Questions section of the [employer] website. Remember, [employer] is NOT to be used for urgent needs. For medical emergencies, dial 911. Now available from your iPhone and Android! Please provide this summary of care documentation to your next provider. Your primary care clinician is listed as Petra Noe. If you have any questions after today's visit, please call [phone].

## 2017-05-10 NOTE — TELEPHONE ENCOUNTER
Call completed to patient, two identifiers verified. Inquired if patient is still under the care of Dr. Heide Terry. Patient states that Dr. Rob Henriquez is his PCP. Patient advised the request for meds will be discarded.

## 2017-06-07 ENCOUNTER — OFFICE VISIT (OUTPATIENT)
Dept: ENDOCRINOLOGY | Age: 77
End: 2017-06-07

## 2017-06-07 VITALS
TEMPERATURE: 97.9 F | HEART RATE: 59 BPM | BODY MASS INDEX: 24.56 KG/M2 | SYSTOLIC BLOOD PRESSURE: 150 MMHG | RESPIRATION RATE: 18 BRPM | WEIGHT: 147.4 LBS | OXYGEN SATURATION: 100 % | HEIGHT: 65 IN | DIASTOLIC BLOOD PRESSURE: 71 MMHG

## 2017-06-07 DIAGNOSIS — I10 ESSENTIAL HYPERTENSION: ICD-10-CM

## 2017-06-07 DIAGNOSIS — E78.2 MIXED HYPERLIPIDEMIA: ICD-10-CM

## 2017-06-07 LAB
GLUCOSE POC: NORMAL MG/DL
HBA1C MFR BLD HPLC: 8.9 %

## 2017-06-07 RX ORDER — GLUCOSAMINE SULFATE 1500 MG
POWDER IN PACKET (EA) ORAL DAILY
COMMUNITY

## 2017-06-07 NOTE — PROGRESS NOTES
Wt Readings from Last 3 Encounters:   06/07/17 147 lb 6.4 oz (66.9 kg)   05/09/17 146 lb 6.4 oz (66.4 kg)   04/21/17 147 lb (66.7 kg)     Temp Readings from Last 3 Encounters:   06/07/17 97.9 °F (36.6 °C) (Oral)   05/09/17 96.6 °F (35.9 °C) (Oral)   04/21/17 96.6 °F (35.9 °C) (Oral)     BP Readings from Last 3 Encounters:   06/07/17 150/71   05/09/17 115/63   04/21/17 125/63     Pulse Readings from Last 3 Encounters:   06/07/17 (!) 59   05/09/17 62   04/21/17 70     Lab Results   Component Value Date/Time    Hemoglobin A1c 11.0 04/21/2017 02:37 PM    Hemoglobin A1c (POC) 9.9 03/28/2017 12:13 PM    Hemoglobin A1c, External 9.4 12/04/2015 11:36 AM   Foot Exam: Yes  Eye Exam: yes

## 2017-06-07 NOTE — PROGRESS NOTES
HISTORY OF PRESENT ILLNESS  Mattie Long is a 78 y.o. male. HPI   f/u visit after last visit for DM  2  Management   From May  2017     accompanied by wife     Has some  low sugars   Some high sugars from eating snacks in between meals     He is here with log , food as well as sugar    Prior history   Referred : by self    H/o diabetes for several  years   Accompanied by wife who is providing the history     Pt in general has drowsiness,   Per WIFE   He was given elavil 50 mg the night before the hospitalization  Current A1C is 9.9 % and symptoms/problems include polyuria, polydipsia and visual disturbances     Current diabetic medications include metformin. When discharged from hospital  On feb 16 2017  , he was told to take only Metformin 1 tab bid   He was told to not to take Toujeo  And jardiance     Current monitoring regimen: home blood tests - 3-4 times a day   Home blood sugar records: trend: fluctuating a lot  Any episodes of hypoglycemia?  yes - multiple soon after Toujeo is taken   He has taken 40 units     Weight trend: stable  Prior visit with dietician: no  Current diet: \"unhealthy\" diet in general  Current exercise: no regular exercise    Known diabetic complications: peripheral neuropathy and cerebrovascular disease  Cardiovascular risk factors: dyslipidemia, diabetes mellitus, obesity, male gender, hypertension, stress    Eye exam current (within one year): yes  LUCIANA: no     Past Medical History:   Diagnosis Date    Cataract     left eye cataract surg    Chronic pain     copd     error    Diabetes ([address]     Hammer toe     HLD (hyperlipidemia) 4/22/2010    Hypertension     Other ill-defined conditions     CLULITIS BILATERAL  FEET    Other ill-defined conditions     BILATERAL GLAUCOMA     Past Surgical History:   Procedure Laterality Date    HX OTHER SURGICAL      CYSTS LT ARM FORHEAD AND RT UPPER QUADRANT    HX UVULOPALATOPHARYNGOPLASTY       Current Outpatient Prescriptions Medication Sig    cholecalciferol (VITAMIN D3) 1,000 unit cap Take  by mouth daily.  aspirin delayed-release 81 mg tablet Take  by mouth daily.  ibuprofen (MOTRIN) 800 mg tablet TAKE 1 TABLET THREE TIMES A DAY ORALLY 30 DAY(S)    omeprazole (PRILOSEC) 40 mg capsule TAKE 1 CAPSULE BY MOUTH EVERYDAY    insulin glargine (LANTUS SOLOSTAR) 100 unit/mL (3 mL) pen Inject 20 units daily after breakfast.    insulin lispro (HUMALOG) 100 unit/mL kwikpen Inject 3 units before breakfast, lunch, and dinner. Plus sliding scale    Lancing Device misc To use daily Dx Code E11.65    tamsulosin (FLOMAX) 0.4 mg capsule Take 1 Cap by mouth nightly.  lovastatin (MEVACOR) 40 mg tablet TAKE 1 TABLET NIGHTLY    losartan (COZAAR) 50 mg tablet Take 1 Tab by mouth daily.  folic acid (FOLVITE) 1 mg tablet Take 1 Tab by mouth daily.  metFORMIN (GLUCOPHAGE) 1,000 mg tablet Take 1 Tab by mouth two (2) times a day.  glucose blood VI test strips (ASCENSIA CONTOUR) strip Use as directed to test blood sugar 3 times daily - DX-Dm-250.00    Lancets misc Contour lancets- test blood sugar 3 times daily    Insulin Needles, Disposable, (INSULIN PEN NEEDLE) 29 x 1/2 \" ndle 1 Package by Does Not Apply route two (2) times a day. No current facility-administered medications for this visit. Review of Systems   Constitutional: Negative. HENT:        He does have swallowing difficulties    Eyes: Negative for pain and redness. Respiratory: Negative. Cardiovascular: Negative for chest pain, palpitations and leg swelling. Gastrointestinal: Negative. Negative for constipation. Genitourinary: Negative. Musculoskeletal: Negative for myalgias. Skin: Negative. Neurological: Negative. Endo/Heme/Allergies: Negative. Psychiatric/Behavioral: Negative for depression and memory loss. The patient does not have insomnia. Physical Exam   Constitutional: He is oriented to person, place, and time.  He appears well-developed and well-nourished. HENT:   Head: Normocephalic. Eyes: Conjunctivae and EOM are normal. Pupils are equal, round, and reactive to light. Neck: Normal range of motion. Neck supple. No JVD present. No tracheal deviation present. Thyromegaly present. Cardiovascular: Normal rate, regular rhythm and normal heart sounds. Pulmonary/Chest: Effort normal and breath sounds normal.   Abdominal: Soft. Bowel sounds are normal.   Musculoskeletal: Normal range of motion. Lymphadenopathy:     He has no cervical adenopathy. Neurological: He is alert and oriented to person, place, and time. He has normal reflexes. Skin: Skin is warm. Psychiatric: He has a normal mood and affect. Lab Results  Component Value Date/Time   Hemoglobin A1c 11.0 04/21/2017 02:37 PM   Hemoglobin A1c 9.6 08/04/2015 06:59 AM   Hemoglobin A1c 8.4 04/02/2015 07:01 AM   Hemoglobin A1c, External 9.4 12/04/2015 11:36 AM   Glucose 250 04/21/2017 02:37 PM   Glucose (POC) 260 04/08/2011 12:07 PM   Glucose POC [facility] 03/28/2017 12:15 PM   Microalbumin/Creat ratio (mg/g creat) 40 04/20/2010 01:00 PM   Microalb/Creat ratio (ug/mg creat.) 40.7 04/25/2017 03:06 PM   Microalbumin,urine random 6.54 04/20/2010 01:00 PM   LDL, calculated 41 04/21/2017 02:37 PM   Creatinine 0.96 04/21/2017 02:37 PM      Lab Results  Component Value Date/Time   Cholesterol, total 131 04/21/2017 02:37 PM   HDL Cholesterol 68 04/21/2017 02:37 PM   LDL, calculated 41 04/21/2017 02:37 PM   Triglyceride 109 04/21/2017 02:37 PM   CHOL/HDL Ratio 2.0 04/20/2010 01:00 PM       Lab Results  Component Value Date/Time   ALT (SGPT) 24 04/21/2017 02:37 PM   AST (SGOT) 25 04/21/2017 02:37 PM   Alk.  phosphatase 70 04/21/2017 02:37 PM   Bilirubin, direct 0.1 04/20/2010 01:00 PM   Bilirubin, total <0.2 04/21/2017 02:37 PM       Lab Results  Component Value Date/Time   GFR est AA 89 04/21/2017 02:37 PM   GFR est non-AA 77 04/21/2017 02:37 PM   Creatinine 0.96 04/21/2017 02:37 PM BUN 17 04/21/2017 02:37 PM   Sodium 137 04/21/2017 02:37 PM   Potassium 5.2 04/21/2017 02:37 PM   Chloride 98 04/21/2017 02:37 PM   CO2 25 04/21/2017 02:37 PM            ASSESSMENT and PLAN    1. Type 2 DM poorly controlled : a1c is 8.9 %    From   Today by finger stick   Compared to   11 %    From  April 2017    compared to   9.9 %   From march 2017      Reviewed the glucose log , noticing great improvement   There are lows noted on log, but pt was not symptomatic per wife     So, had to run A1c early on to see the improvement   Did glucose in office and compared to theie meter - 30 points lower       Anyway lowering lantus to 16 units as at this age, safety is first       Continuing   on  Lantus in AM, stop Toujeo   Continuing  on humalog and very easy to follow sliding scale   Explained the regimen     Safety emphasized     Toujeo did nto suit him at all ( need renal function)  Stop Amaryl and starlix  Failed  medication trial     Patient is advised to check blood sugars 4 times daily by rotation method. reviewed medications and side effects in detail  lab results and schedule of future lab studies reviewed with patient        2. Hypoglycemia :  Educated on treating the hypoglycemia. No inadvertant use of insulin, wife clarifies that he takes insulin whenever     3. HTN : continue cozaar 50 mg . Patient is educated about importance of compliance with anti-hypertensives especially ARB/ACEI    4. Dyslipidemia : continue Mevacor 40 mg hs . Patient is educated about benefits and adverse effects of statins and explained how benefits outweigh risk. 5. use of aspirin to prevent MI and TIA's discussed      6.   Multi nodular goiter :   Thyroid usg - dec 2015 , Left side he has 3 nodules,  1.5 cm dominant nodule and then one mid level 1.2 cm and one 0.6 cm on upper pole   Right side upper pole it is 1.2 cm   He needs a f/u usg       > 50 % of time is spent on counseling

## 2017-06-07 NOTE — PATIENT INSTRUCTIONS
Stay on metformin 1000 mg twice a day with meals     Check blood sugars immediately before each meal and at bedtime      Decrease  LANTUS  insulin  16   units  After  b-fast     Take Humalog  insulin 3  units before breakfast, 3 units before lunch and 3 units before dinner  When sugars are between 90 to 150 mg         Take  humalog  as follows with meals  If blood sugars are[de-identified]    150-200 mg 4 units    201-250 mg 5 units    251-300 mg 6 units    301-350 mg 7 units    351-400 mg 8 units    401-450 mg 9 units    451-500 mg 10 units     Less than 90 mg NO INSULIN

## 2017-06-07 NOTE — MR AVS SNAPSHOT
Visit Information Date & Time Provider Department Dept. Phone Encounter #  
 6/7/2017  3:30 PM Laura Guthrie MD [facility] [phone] [id number] Your Appointments 6/29/2017  1:15 PM  
ROUTINE CARE with Laura Guthrie MD  
[facility]) Appt Note: f/u 2 month  
 [address] [place] [address] [facility] [address]  
[phone]  
  
   
 [address] [place] [id number] Upcoming Health Maintenance Date Due DTaP/Tdap/Td series (1 - Tdap) 11/13/2011 MEDICARE YEARLY EXAM 3/19/2016 EYE EXAM RETINAL OR DILATED Q1 9/8/2016 FOOT EXAM Q1 11/23/2016 INFLUENZA AGE 9 TO ADULT 8/1/2017 GLAUCOMA SCREENING Q2Y 9/8/2017 HEMOGLOBIN A1C Q6M 10/21/2017 LIPID PANEL Q1 4/21/2018 MICROALBUMIN Q1 4/25/2018 Pneumococcal 65+ Low/Medium Risk (2 of 2 - PPSV23) 11/1/2018 COLONOSCOPY 8/1/2024 Allergies as of 6/7/2017  Review Complete On: 6/7/2017 By: Laura Guthrie MD  
  
 Severity Noted Reaction Type Reactions Ace Inhibitors  04/20/2010   Side Effect Cough Current Immunizations  Reviewed on 4/15/2011 Name Date Influenza High Dose Vaccine PF 11/16/2015 Influenza Vaccine 10/1/2014, 11/1/2013 Pneumococcal Vaccine (Unspecified Type) 11/1/2013 Td 11/12/2011 Not reviewed this visit You Were Diagnosed With   
  
 Codes Comments Uncontrolled diabetes mellitus type 2 without complications, unspecified long term insulin use status ([facility]    -  Primary ICD-10-CM: E11.65 ICD-9-CM: 250.02 Essential hypertension     ICD-10-CM: I10 
ICD-9-CM: 401.9 Mixed hyperlipidemia     ICD-10-CM: E78.2 ICD-9-CM: 272.2 Vitals BP Pulse Temp Resp Height(growth percentile) Weight(growth percentile) 150/71 (BP 1 Location: Right arm, BP Patient Position: Sitting) (!) 59 97.9 °F (36.6 °C) (Oral) 18 5' 5\" (1.651 m) 147 lb 6.4 oz (66.9 kg) SpO2 BMI Smoking Status 100% 24.53 kg/m2 Never Smoker Vitals History BMI and BSA Data Body Mass Index Body Surface Area 24.53 kg/m 2 1.75 m 2 Preferred Pharmacy Pharmacy Name Phone [id number] [address] [facility] [phone] Your Updated Medication List  
  
   
This list is accurate as of: 6/7/17  4:46 PM.  Always use your most recent med list.  
  
  
  
  
 aspirin delayed-release 81 mg tablet Take  by mouth daily. folic acid 1 mg tablet Commonly known as:  Google Take 1 Tab by mouth daily. glucose blood VI test strips strip Commonly known as:  Ascensia CONTOUR Use as directed to test blood sugar 3 times daily - DX-Dm-250.00  
  
 ibuprofen 800 mg tablet Commonly known as:  MOTRIN  
TAKE 1 TABLET THREE TIMES A DAY ORALLY 30 DAY(S)  
  
 insulin glargine 100 unit/mL (3 mL) Inpn Commonly known as:  General Sinner Inject 20 units daily after breakfast.  
  
 insulin lispro 100 unit/mL kwikpen Commonly known as:  HUMALOG Inject 3 units before breakfast, lunch, and dinner. Plus sliding scale Insulin Needles (Disposable) 29 gauge x 1/2\" Ndle Commonly known as:  PEN NEEDLE, DIABETIC  
1 Package by Does Not Apply route two (2) times a day. Lancets Misc Contour lancets- test blood sugar 3 times daily Lancing Device Misc To use daily Dx Code E11.65  
  
 losartan 50 mg tablet Commonly known as:  COZAAR Take 1 Tab by mouth daily. lovastatin 40 mg tablet Commonly known as:  MEVACOR  
TAKE 1 TABLET NIGHTLY  
  
 metFORMIN 1,000 mg tablet Commonly known as:  GLUCOPHAGE Take 1 Tab by mouth two (2) times a day. omeprazole 40 mg capsule Commonly known as:  PRILOSEC  
TAKE 1 CAPSULE BY MOUTH EVERYDAY  
  
 tamsulosin 0.4 mg capsule Commonly known as:  FLOMAX Take 1 Cap by mouth nightly. VITAMIN D3 1,000 unit Cap Generic drug:  cholecalciferol Take  by mouth daily. We Performed the Following AMB POC GLUCOSE, QUANTITATIVE, BLOOD [29860 CPT(R)] AMB POC HEMOGLOBIN A1C [10248 CPT(R)] Patient Instructions Stay on metformin 1000 mg twice a day with meals Check blood sugars immediately before each meal and at bedtime Decrease  LANTUS  insulin  16   units  After  b-fast  
 
Take Humalog  insulin 3  units before breakfast, 3 units before lunch and 3 units before dinner  When sugars are between 90 to 150 mg Take  humalog  as follows with meals  If blood sugars are[de-identified] 
 
150-200 mg 4 units 201-250 mg 5 units 251-300 mg 6 units 301-350 mg 7 units 351-400 mg 8 units 401-450 mg 9 units 451-500 mg 10 units Less than 90 mg NO INSULIN Introducing \[facility]\"" & [facility] SERVICES! Neftali Hackett introduces [employer] patient portal. Now you can access parts of your medical record, email your doctor's office, and request medication refills online. 1. In your internet browser, go to [email or web address] 2. Click on the First Time User? Click Here link in the Sign In box. You will see the New Member Sign Up page. 3. Enter your [employer] Access Code exactly as it appears below. You will not need to use this code after youve completed the sign-up process. If you do not sign up before the expiration date, you must request a new code. · [employer] Access Code: [email or web address] Expires: 6/26/2017  1:29 PM 
 
4. Enter the last four digits of your Social Security Number (xxxx) and Date of Birth (mm/dd/yyyy) as indicated and click Submit. You will be taken to the next sign-up page. 5. Create a [employer] ID. This will be your [employer] login ID and cannot be changed, so think of one that is secure and easy to remember. 6. Create a [employer] password. You can change your password at any time. 7. Enter your Password Reset Question and Answer. This can be used at a later time if you forget your password. 8. Enter your e-mail address. You will receive e-mail notification when new information is available in [address] 9. Click Sign Up. You can now view and download portions of your medical record. 10. Click the Download Summary menu link to download a portable copy of your medical information. If you have questions, please visit the Frequently Asked Questions section of the [employer] website. Remember, [employer] is NOT to be used for urgent needs. For medical emergencies, dial 911. Now available from your iPhone and Android! Please provide this summary of care documentation to your next provider. Your primary care clinician is listed as Blondie Standard. Thein. If you have any questions after today's visit, please call [phone].

## 2017-07-20 ENCOUNTER — TELEPHONE (OUTPATIENT)
Dept: ENDOCRINOLOGY | Age: 77
End: 2017-07-20

## 2017-07-20 NOTE — TELEPHONE ENCOUNTER
Patient's wife called very concerned. Patient is continuing to have low blood sugars. She stated her daughter found patient today around 1130am slumped over at home. She immediately woke him up and gave him 8oz of juice and called 911. Blood sugar was 70 when EMS arrived. Patient's blood sugars are as follows:  07/17 BB 75    07/18 BB 65 BL 85 before bed (930pm) 143  07/19    07/20 0500 114    Other low blood sugars are as follows  07/09 BB70  07/12 10 pm 81  07/13 5:15 98  Wife stated she is anxious about going to bed because she is afraid blood sugar will drop. He is scheduled for back surgery on 08/31/17. His next appointment here is September 11, 2017. Please advise.

## 2017-07-20 NOTE — TELEPHONE ENCOUNTER
Reduce lantus to 10 units AM  He needs to have supervised insulin shots all thru every day   If wife cannot support, arrange home health    / GUERRERO

## 2017-07-20 NOTE — TELEPHONE ENCOUNTER
Spoke with patient's wife and she stated she works 8 days per month. She was at work this morning when his blood sugar dropped. Explained to her the importance of someone closely monitoring patient administering insulin and making sure he eats when he takes insulin. She stated she is there most of the time. Encouraged her to observe every insulin injection. Stated she would. Told her if needed we can arrange home health care for medication management. Will decrease lantus to 10 units in the morning. Please advise about upcoming surgery August 31. Should patient be seen before surgery? Next scheduled appointment is 09/11.

## 2017-07-21 NOTE — TELEPHONE ENCOUNTER
What surgery is he going for  ?     Get the log of blood sugars , insulin  And  food log for 3 days from now- ask wife to maintain record and bring it to us

## 2017-07-21 NOTE — TELEPHONE ENCOUNTER
Spoke with patient's wife and she stated will keep log of blood sugars, diet, and insulin intake though the weekend and will bring it in on Monday July 24, 2017. Patient's blood sugar was stable though the night and this morning.

## 2017-08-11 ENCOUNTER — HOSPITAL ENCOUNTER (OUTPATIENT)
Dept: PREADMISSION TESTING | Age: 77
Discharge: HOME | End: 2017-08-11
Payer: MEDICARE

## 2017-08-11 VITALS
RESPIRATION RATE: 18 BRPM | TEMPERATURE: 98.1 F | HEART RATE: 68 BPM | SYSTOLIC BLOOD PRESSURE: 100 MMHG | BODY MASS INDEX: 21.35 KG/M2 | WEIGHT: 136 LBS | DIASTOLIC BLOOD PRESSURE: 59 MMHG | HEIGHT: 67 IN

## 2017-08-11 LAB
ABO + RH BLD: NORMAL
ANION GAP BLD CALC-SCNC: 5 MMOL/L (ref 5–15)
ATRIAL RATE: 63 BPM
BLOOD GROUP ANTIBODIES SERPL: NORMAL
BUN SERPL-MCNC: 20 MG/DL (ref 6–20)
BUN/CREAT SERPL: 20 (ref 12–20)
CALCIUM SERPL-MCNC: 9.7 MG/DL (ref 8.5–10.1)
CALCULATED P AXIS, ECG09: 41 DEGREES
CALCULATED R AXIS, ECG10: -33 DEGREES
CALCULATED T AXIS, ECG11: 37 DEGREES
CHLORIDE SERPL-SCNC: 100 MMOL/L (ref 97–108)
CO2 SERPL-SCNC: 32 MMOL/L (ref 21–32)
CREAT SERPL-MCNC: 1.01 MG/DL (ref 0.7–1.3)
DIAGNOSIS, 93000: NORMAL
ERYTHROCYTE [DISTWIDTH] IN BLOOD BY AUTOMATED COUNT: 13.2 % (ref 11.5–14.5)
EST. AVERAGE GLUCOSE BLD GHB EST-MCNC: 212 MG/DL
GLUCOSE SERPL-MCNC: 203 MG/DL (ref 65–100)
HBA1C MFR BLD: 9 % (ref 4.2–6.3)
HCT VFR BLD AUTO: 32.4 % (ref 36.6–50.3)
HGB BLD-MCNC: 10.7 G/DL (ref 12.1–17)
INR PPP: 1.1 (ref 0.9–1.1)
MCH RBC QN AUTO: 29.1 PG (ref 26–34)
MCHC RBC AUTO-ENTMCNC: 33 G/DL (ref 30–36.5)
MCV RBC AUTO: 88 FL (ref 80–99)
P-R INTERVAL, ECG05: 186 MS
PLATELET # BLD AUTO: 174 K/UL (ref 150–400)
POTASSIUM SERPL-SCNC: 4.6 MMOL/L (ref 3.5–5.1)
PROTHROMBIN TIME: 10.7 SEC (ref 9–11.1)
Q-T INTERVAL, ECG07: 420 MS
QRS DURATION, ECG06: 96 MS
QTC CALCULATION (BEZET), ECG08: 429 MS
RBC # BLD AUTO: 3.68 M/UL (ref 4.1–5.7)
SODIUM SERPL-SCNC: 137 MMOL/L (ref 136–145)
SPECIMEN EXP DATE BLD: NORMAL
VENTRICULAR RATE, ECG03: 63 BPM
WBC # BLD AUTO: 4 K/UL (ref 4.1–11.1)

## 2017-08-11 PROCEDURE — 83036 HEMOGLOBIN GLYCOSYLATED A1C: CPT | Performed by: ORTHOPAEDIC SURGERY

## 2017-08-11 PROCEDURE — 85610 PROTHROMBIN TIME: CPT | Performed by: ORTHOPAEDIC SURGERY

## 2017-08-11 PROCEDURE — 86900 BLOOD TYPING SEROLOGIC ABO: CPT | Performed by: ORTHOPAEDIC SURGERY

## 2017-08-11 PROCEDURE — 36415 COLL VENOUS BLD VENIPUNCTURE: CPT | Performed by: ORTHOPAEDIC SURGERY

## 2017-08-11 PROCEDURE — 93005 ELECTROCARDIOGRAM TRACING: CPT

## 2017-08-11 PROCEDURE — 80048 BASIC METABOLIC PNL TOTAL CA: CPT | Performed by: ORTHOPAEDIC SURGERY

## 2017-08-11 PROCEDURE — 85027 COMPLETE CBC AUTOMATED: CPT | Performed by: ORTHOPAEDIC SURGERY

## 2017-08-11 NOTE — PERIOP NOTES
A MESSAGE WAS LEFT C DR. HURT'S NURSE INFORMING HER THAT THE PT WAS UNABLE TO GIVE A URINE SPECIMEN. THE PT IS GOING TO HIS PCP ON AUG. 15 AND WILL DO THE SPECIMEN THERE IF IT IS OK C DR. HURT'S OFFICE. IT WAS ASKED IF SHE COULD CALL HIM BEFORE HIS VISIT AND LET HIM KNOW IF THAT IS OK.

## 2017-08-11 NOTE — PERIOP NOTES
PREOPERATIVE INSTRUCTIONS REVIEWED WITH PATIENT. PATIENT GIVEN SIX PACK OF CHG WIPES. INSTRUCTIONS [REVIEWED ON USE OF CHG WIPES. PATIENT GIVEN SSI INFECTION FAQ SHEET, INFORMATION SHEET ON DIABETIC TREATMENT CENTER AS WELL AS HAND WASHING TIPS SHEETS. MRSA/MSSA TREATMENT INSTRUCTION SHEET GIVEN WITH AN EXPLANATION TO PATIENT THAT THEY WILL BE NOTIFIED IF TREATMENT INSTRUCTIONS NEED TO BE INITIATED. PATIENT WAS GIVEN THE OPPORTUNITY TO ASK QUESTIONS ON THE INFORMATION PROVIDED.

## 2017-08-12 LAB
BACTERIA SPEC CULT: NORMAL
BACTERIA SPEC CULT: NORMAL
SERVICE CMNT-IMP: NORMAL

## 2017-08-14 ENCOUNTER — HOSPITAL ENCOUNTER (OUTPATIENT)
Dept: CT IMAGING | Age: 77
Discharge: HOME | End: 2017-08-14
Attending: ORTHOPAEDIC SURGERY
Payer: MEDICARE

## 2017-08-14 ENCOUNTER — HOSPITAL ENCOUNTER (OUTPATIENT)
Dept: MRI IMAGING | Age: 77
Discharge: HOME | End: 2017-08-14
Attending: ORTHOPAEDIC SURGERY
Payer: MEDICARE

## 2017-08-14 DIAGNOSIS — M48.061 LUMBAR STENOSIS: ICD-10-CM

## 2017-08-14 DIAGNOSIS — M43.10 ACQUIRED SPONDYLOLISTHESIS: ICD-10-CM

## 2017-08-14 PROCEDURE — 72131 CT LUMBAR SPINE W/O DYE: CPT

## 2017-08-14 PROCEDURE — 72148 MRI LUMBAR SPINE W/O DYE: CPT

## 2017-08-14 NOTE — PERIOP NOTES
Faxed PAT testing reports (and fax confirmation received) to 's office. Called at  4:10 pm on 8/14/17 (left message on Angelica's voice mail) RE: abnormal  A1C-9  hemaglobin 10.7 hematocrit 32.4  Glucose 203 and abnormal ekg that will be sent to the anesthesiologist for review. Dennis Mercado

## 2017-08-14 NOTE — PERIOP NOTES
Results faxed and called to  Dr. Causey Organ and message left with Lizett Garcia and  order [facility] for [facility]. Hemoglobin A1C 9.

## 2017-08-25 ENCOUNTER — OFFICE VISIT (OUTPATIENT)
Dept: ENDOCRINOLOGY | Age: 77
End: 2017-08-25

## 2017-08-25 VITALS
BODY MASS INDEX: 21.39 KG/M2 | SYSTOLIC BLOOD PRESSURE: 109 MMHG | HEART RATE: 70 BPM | OXYGEN SATURATION: 100 % | RESPIRATION RATE: 16 BRPM | DIASTOLIC BLOOD PRESSURE: 59 MMHG | HEIGHT: 67 IN | WEIGHT: 136.3 LBS | TEMPERATURE: 95.7 F

## 2017-08-25 DIAGNOSIS — E16.2 HYPOGLYCEMIA: ICD-10-CM

## 2017-08-25 DIAGNOSIS — Z79.4 UNCONTROLLED TYPE 2 DIABETES MELLITUS WITH HYPERGLYCEMIA, WITH LONG-TERM CURRENT USE OF INSULIN (HCC): Primary | ICD-10-CM

## 2017-08-25 DIAGNOSIS — E11.65 UNCONTROLLED TYPE 2 DIABETES MELLITUS WITH HYPERGLYCEMIA, WITH LONG-TERM CURRENT USE OF INSULIN (HCC): Primary | ICD-10-CM

## 2017-08-25 DIAGNOSIS — I10 ESSENTIAL HYPERTENSION: ICD-10-CM

## 2017-08-25 DIAGNOSIS — E78.2 MIXED HYPERLIPIDEMIA: ICD-10-CM

## 2017-08-25 NOTE — PROGRESS NOTES
Jakub San is a 78 y.o. male here for   Chief Complaint   Patient presents with    Diabetes    Cholesterol Problem    Hypertension     Pt is scheduled for back [facility] on 8/31/[facility] at [facility]    Functional glucose monitor and record keeping system? - yes  Eye exam within last year? - July 2017  Foot exam within last year? - over 3 months ago    1. Have you been to the ER, urgent care clinic since your last visit? Hospitalized since your last visit? -no     2. Have you seen or consulted any other health care providers outside of the [address] [place] [address] since your last visit? Include any pap smears or colon screening. -PCP      Lab Results   Component Value Date/Time    Hemoglobin A1c 9.0 08/11/2017 12:04 PM    Hemoglobin A1c (POC) 8.9 06/07/2017 04:31 PM    Hemoglobin A1c, External 9.4 12/04/2015 11:36 AM       Wt Readings from Last 3 Encounters:   08/11/17 136 lb (61.7 kg)   06/07/17 147 lb 6.4 oz (66.9 kg)   05/09/17 146 lb 6.4 oz (66.4 kg)     Temp Readings from Last 3 Encounters:   08/11/17 98.1 °F (36.7 °C)   06/07/17 97.9 °F (36.6 °C) (Oral)   05/09/17 96.6 °F (35.9 °C) (Oral)     BP Readings from Last 3 Encounters:   08/11/17 100/59   06/07/17 150/71   05/09/17 115/63     Pulse Readings from Last 3 Encounters:   08/11/17 68   06/07/17 (!) 59   05/09/17 62

## 2017-08-25 NOTE — MR AVS SNAPSHOT
Visit Information Date & Time Provider Department Dept. Phone Encounter #  
 8/25/2017 11:45 AM Kimberly Menendez MD [facility] [phone] [id number] Follow-up Instructions Return in about 3 months (around 11/25/2017). Your Appointments 9/5/2017 11:00 AM  
LAB with CDE NURSE [facility] [address] [facility]) Appt Note: lab only [address] [place] [address] [facility] [address]  
[phone]  
  
   
 [phone]  
  
    
 9/11/2017  3:30 PM  
ROUTINE CARE with Kimberly Menendez MD  
[facility]) Appt Note: f/u 3 month  
 [address] [place] [address] [facility] [id number] [phone]  
  
   
 [address] [facility] [address] Upcoming Health Maintenance Date Due DTaP/Tdap/Td series (1 - Tdap) 11/13/2011 MEDICARE YEARLY EXAM 3/19/2016 EYE EXAM RETINAL OR DILATED Q1 9/8/2016 FOOT EXAM Q1 11/23/2016 INFLUENZA AGE 9 TO ADULT 8/1/2017 GLAUCOMA SCREENING Q2Y 9/8/2017 HEMOGLOBIN A1C Q6M 2/11/2018 LIPID PANEL Q1 4/21/2018 MICROALBUMIN Q1 4/25/2018 Pneumococcal 65+ Low/Medium Risk (2 of 2 - PPSV23) 11/1/2018 COLONOSCOPY 8/1/2024 Allergies as of 8/25/2017  Review Complete On: 8/25/2017 By: Kimberly Menendez MD  
  
 Severity Noted Reaction Type Reactions Ace Inhibitors  04/20/2010   Side Effect Cough Current Immunizations  Reviewed on 4/15/2011 Name Date Influenza High Dose Vaccine PF 11/16/2015 Influenza Vaccine 10/1/2014, 11/1/2013 Pneumococcal Vaccine (Unspecified Type) 11/1/2013 Td 11/12/2011 Not reviewed this visit You Were Diagnosed With   
  
 Codes Comments Uncontrolled type 2 diabetes mellitus with hyperglycemia, with long-term current use of insulin (HCC)    -  Primary ICD-10-CM: E11.65, Z79.4 ICD-9-CM: 250.02, V58.67  Essential hypertension     ICD-10-CM: I10 
 ICD-9-CM: 401.9 Vitals BP Pulse Temp Resp Height(growth percentile) Weight(growth percentile) 109/59 (BP 1 Location: Left arm, BP Patient Position: Sitting) 70 95.7 °F (35.4 °C) (Oral) 16 5' 7\" (1.702 m) 136 lb 4.8 oz (61.8 kg) SpO2 BMI Smoking Status 100% 21.35 kg/m2 Never Smoker Vitals History BMI and BSA Data Body Mass Index Body Surface Area  
 21.35 kg/m 2 1.71 m 2 Preferred Pharmacy Pharmacy Name Phone [id number] [address] AT [place] [address] [phone] Your Updated Medication List  
  
   
This list is accurate as of: 8/25/17 12:35 PM.  Always use your most recent med list.  
  
  
  
  
 aspirin delayed-release 81 mg tablet Take  by mouth daily. folic acid 1 mg tablet Commonly known as:  Google Take 1 Tab by mouth daily. glucose blood VI test strips strip Commonly known as:  Ascensia CONTOUR Use as directed to test blood sugar 3 times daily - DX-Dm-250.00  
  
 ibuprofen 800 mg tablet Commonly known as:  MOTRIN  
TAKE 1 TABLET THREE TIMES A DAY ORALLY 30 DAY(S)  
  
 insulin glargine 100 unit/mL (3 mL) Inpn Commonly known as:  Jerre Charnley Inject 20 units daily after breakfast.  
  
 insulin lispro 100 unit/mL kwikpen Commonly known as:  HUMALOG Inject 3 units before breakfast, lunch, and dinner. Plus sliding scale Insulin Needles (Disposable) 29 gauge x 1/2\" Ndle Commonly known as:  PEN NEEDLE, DIABETIC  
1 Package by Does Not Apply route two (2) times a day. Lancets Misc Contour lancets- test blood sugar 3 times daily Lancing Device Misc To use daily Dx Code E11.65  
  
 losartan 50 mg tablet Commonly known as:  COZAAR Take 1 Tab by mouth daily. lovastatin 40 mg tablet Commonly known as:  MEVACOR  
TAKE 1 TABLET NIGHTLY  
  
 metFORMIN 1,000 mg tablet Commonly known as:  GLUCOPHAGE  
 Take 1 Tab by mouth two (2) times a day. omeprazole 40 mg capsule Commonly known as:  PRILOSEC  
TAKE 1 CAPSULE BY MOUTH EVERYDAY  
  
 tamsulosin 0.4 mg capsule Commonly known as:  FLOMAX Take 1 Cap by mouth nightly. VITAMIN D3 1,000 unit Cap Generic drug:  cholecalciferol Take  by mouth daily. Follow-up Instructions Return in about 3 months (around 11/25/2017). Patient Instructions Stay on metformin 1000 mg twice a day with meals Check blood sugars immediately before each meal and at bedtime LANTUS  insulin  10   units  After  b-fast  
 
Take Humalog  insulin 3  units before breakfast, 3 units before lunch and 3 units before dinner  When sugars are between 90 to 150 mg Pt wife to bring in a meal plan made by her from home ( to get the count of carbs and consistencies ) 45 gm per meal  
 
 
Take  humalog  as follows with meals  If blood sugars are[de-identified] 
 
150-200 mg 4 units 201-250 mg 5 units 251-300 mg 6 units 301-350 mg 7 units 351-400 mg 8 units 401-450 mg 9 units 451-500 mg 10 units Less than 90 mg NO INSULIN 
----------------------------------------------------------------------------------------- No meds  On THE DAY OF SURGERY Introducing \[facility]\"" & HEALTH SERVICES! [place] introduces [employer] patient portal. Now you can access parts of your medical record, email your doctor's office, and request medication refills online. 1. In your internet browser, go to [email or web address] 2. Click on the First Time User? Click Here link in the Sign In box. You will see the New Member Sign Up page. 3. Enter your [employer] Access Code exactly as it appears below. You will not need to use this code after youve completed the sign-up process. If you do not sign up before the expiration date, you must request a new code. · [employer] Access Code: Gina Trent Expires: 11/9/2017  9:51 AM 
 
 4. Enter the last four digits of your Social Security Number (xxxx) and Date of Birth (mm/dd/yyyy) as indicated and click Submit. You will be taken to the next sign-up page. 5. Create a [employer] ID. This will be your [employer] login ID and cannot be changed, so think of one that is secure and easy to remember. 6. Create a [employer] password. You can change your password at any time. 7. Enter your Password Reset Question and Answer. This can be used at a later time if you forget your password. 8. Enter your e-mail address. You will receive e-mail notification when new information is available in [address] 9. Click Sign Up. You can now view and download portions of your medical record. 10. Click the Download Summary menu link to download a portable copy of your medical information. If you have questions, please visit the Frequently Asked Questions section of the [employer] website. Remember, [employer] is NOT to be used for urgent needs. For medical emergencies, dial 911. Now available from your iPhone and Android! Please provide this summary of care documentation to your next provider. Your primary care clinician is listed as Anel Osborn. If you have any questions after today's visit, please call [phone].

## 2017-08-25 NOTE — PATIENT INSTRUCTIONS
Stay on metformin 1000 mg twice a day with meals     Check blood sugars immediately before each meal and at bedtime        LANTUS  insulin  10   units  After  b-fast     Take Humalog  insulin 3  units before breakfast, 3 units before lunch and 3 units before dinner  When sugars are between 90 to 150 mg   Pt wife to bring in a meal plan made by her from home ( to get the count of carbs and consistencies )      45 gm per meal       Take  humalog  as follows with meals  If blood sugars are[de-identified]    150-200 mg 4 units    201-250 mg 5 units    251-300 mg 6 units    301-350 mg 7 units    351-400 mg 8 units    401-450 mg 9 units    451-500 mg 10 units     Less than 90 mg NO INSULIN  -----------------------------------------------------------------------------------------    No meds  On THE DAY OF SURGERY

## 2017-08-25 NOTE — PROGRESS NOTES
HISTORY OF PRESENT ILLNESS  Maliha Nicolas is a 78 y.o. male. HPI   f/u visit after last visit for DM  2  Management   From June 7 2017     accompanied by wife     Had very fluctuant sugars   Some high sugars from eating snacks in between meals       In the interim, pt had a very low blood sugar     He is here with log , food as well as glucose   Wife is helpful in providing the info   Going in for spinal surgery         Prior history   Referred : by self    H/o diabetes for several  years   Accompanied by wife who is providing the history     Pt in general has drowsiness,   Per WIFE   He was given elavil 50 mg the night before the hospitalization  Current A1C is 9.9 % and symptoms/problems include polyuria, polydipsia and visual disturbances     Current diabetic medications include metformin. When discharged from hospital  On feb 16 2017  , he was told to take only Metformin 1 tab bid   He was told to not to take Toujeo  And jardiance     Current monitoring regimen: home blood tests - 3-4 times a day   Home blood sugar records: trend: fluctuating a lot  Any episodes of hypoglycemia?  yes - multiple soon after Toujeo is taken   He has taken 40 units     Weight trend: stable  Prior visit with dietician: no  Current diet: \"unhealthy\" diet in general  Current exercise: no regular exercise    Known diabetic complications: peripheral neuropathy and cerebrovascular disease  Cardiovascular risk factors: dyslipidemia, diabetes mellitus, obesity, male gender, hypertension, stress    Eye exam current (within one year): yes  LUCIANA: no     Past Medical History:   Diagnosis Date    Cataract     left eye cataract surg    Chronic pain     copd     error    Diabetes ([address]     Hammer toe     HLD (hyperlipidemia) 4/22/2010    Hypertension     Other ill-defined conditions     CLULITIS BILATERAL  FEET    Other ill-defined conditions     BILATERAL GLAUCOMA     Past Surgical History:   Procedure Laterality Date    HX CATARACT REMOVAL Bilateral     HX GI      COLOONSCOPY    HX ORTHOPAEDIC  2012    LOWER BACK SURGERY    HX OTHER SURGICAL      CYSTS LT ARM FORHEAD AND RT UPPER QUADRANT    HX UVULOPALATOPHARYNGOPLASTY       Current Outpatient Prescriptions   Medication Sig    cholecalciferol (VITAMIN D3) 1,000 unit cap Take  by mouth daily.  omeprazole (PRILOSEC) 40 mg capsule TAKE 1 CAPSULE BY MOUTH EVERYDAY    insulin glargine (LANTUS SOLOSTAR) 100 unit/mL (3 mL) pen Inject 20 units daily after breakfast. (Patient taking differently: 10 Units daily (after breakfast). daily after breakfast.)    insulin lispro (HUMALOG) 100 unit/mL kwikpen Inject 3 units before breakfast, lunch, and dinner. Plus sliding scale (Patient taking differently: 3 Units by SubCUTAneous route Before breakfast, lunch, and dinner. IF BS IS  3 UNITS IF LESS NO INSULIN.)    Lancing Device misc To use daily Dx Code E11.65    tamsulosin (FLOMAX) 0.4 mg capsule Take 1 Cap by mouth nightly.  lovastatin (MEVACOR) 40 mg tablet TAKE 1 TABLET NIGHTLY    losartan (COZAAR) 50 mg tablet Take 1 Tab by mouth daily.  folic acid (FOLVITE) 1 mg tablet Take 1 Tab by mouth daily.  metFORMIN (GLUCOPHAGE) 1,000 mg tablet Take 1 Tab by mouth two (2) times a day.  glucose blood VI test strips (ASCENSIA CONTOUR) strip Use as directed to test blood sugar 3 times daily - DX-Dm-250.00    Lancets misc Contour lancets- test blood sugar 3 times daily    Insulin Needles, Disposable, (INSULIN PEN NEEDLE) 29 x 1/2 \" ndle 1 Package by Does Not Apply route two (2) times a day.  aspirin delayed-release 81 mg tablet Take  by mouth daily.  ibuprofen (MOTRIN) 800 mg tablet TAKE 1 TABLET THREE TIMES A DAY ORALLY 30 DAY(S)     No current facility-administered medications for this visit. Review of Systems   Constitutional: Negative. HENT:        He does have swallowing difficulties    Eyes: Negative for pain and redness. Respiratory: Negative. Cardiovascular: Negative for chest pain, palpitations and leg swelling. Gastrointestinal: Negative. Negative for constipation. Genitourinary: Negative. Musculoskeletal: Negative for myalgias. Skin: Negative. Neurological: Negative. Endo/Heme/Allergies: Negative. Psychiatric/Behavioral: Negative for depression and memory loss. The patient does not have insomnia. Physical Exam   Constitutional: He is oriented to person, place, and time. He appears well-developed and well-nourished. HENT:   Head: Normocephalic. Eyes: Conjunctivae and EOM are normal. Pupils are equal, round, and reactive to light. Neck: Normal range of motion. Neck supple. No JVD present. No tracheal deviation present. Thyromegaly present. Cardiovascular: Normal rate, regular rhythm and normal heart sounds. Pulmonary/Chest: Effort normal and breath sounds normal.   Abdominal: Soft. Bowel sounds are normal.   Musculoskeletal: Normal range of motion. Lymphadenopathy:     He has no cervical adenopathy. Neurological: He is alert and oriented to person, place, and time. He has normal reflexes. Skin: Skin is warm.    Psychiatric: none       Lab Results   Component Value Date/Time    Hemoglobin A1c 9.0 08/11/2017 12:04 PM    Hemoglobin A1c 11.0 04/21/2017 02:37 PM    Hemoglobin A1c 9.6 08/04/2015 06:59 AM    Hemoglobin A1c, External 9.4 12/04/2015 11:36 AM    Glucose 203 08/11/2017 12:04 PM    Glucose (POC) 260 04/08/2011 12:07 PM    Glucose POC [address] 03/28/2017 12:15 PM    Microalbumin/Creat ratio (mg/g creat) 40 04/20/2010 01:00 PM    Microalb/Creat ratio (ug/mg creat.) 40.7 04/25/2017 03:06 PM    Microalbumin,urine random 6.54 04/20/2010 01:00 PM    LDL, calculated 41 04/21/2017 02:37 PM    Creatinine 1.01 08/11/2017 12:04 PM      Lab Results   Component Value Date/Time    Cholesterol, total 131 04/21/2017 02:37 PM    HDL Cholesterol 68 04/21/2017 02:37 PM    LDL, calculated 41 04/21/2017 02:37 PM    Triglyceride 109 04/21/2017 02:37 PM    CHOL/HDL Ratio 2.0 04/20/2010 01:00 PM       Lab Results   Component Value Date/Time    ALT (SGPT) 24 04/21/2017 02:37 PM    AST (SGOT) 25 04/21/2017 02:37 PM    Alk. phosphatase 70 04/21/2017 02:37 PM    Bilirubin, direct 0.1 04/20/2010 01:00 PM    Bilirubin, total <0.2 04/21/2017 02:37 PM       Lab Results   Component Value Date/Time    GFR est AA >60 08/11/2017 12:04 PM    GFR est non-AA >60 08/11/2017 12:04 PM    Creatinine 1.01 08/11/2017 12:04 PM    BUN 20 08/11/2017 12:04 PM    Sodium 137 08/11/2017 12:04 PM    Potassium 4.6 08/11/2017 12:04 PM    Chloride 100 08/11/2017 12:04 PM    CO2 32 08/11/2017 12:04 PM            ASSESSMENT and PLAN    1. Type 2 DM poorly controlled : a1c is   9 %     From     Aug 2017     comapred to  8.9 %    From   Today by finger stick   Compared to   11 %    From  April 2017    compared to   9.9 %   From march 2017      Reviewed the glucose log , noticing some improvement   Flucutation is expected with him as he behaves like type 1      safety is first   Explained to wife how important is that he gets insulin by carbs   Gave her a brief idea about the importance     Wife is told to add a unit of humalog if pt eats more carbs per meal     She need to bring in meal plan that works for her   Continuing   on  Lantus in AM, stop [employer]  on humalog and very easy to follow sliding scale     Patient is advised to check blood sugars 4 times daily by rotation method. reviewed medications and side effects in detail  lab results and schedule of future lab studies reviewed with patient        2. Hypoglycemia :  Educated on treating the hypoglycemia. No inadvertant use of insulin, wife clarifies that he takes insulin whenever     3. HTN : continue cozaar 50 mg . Patient is educated about importance of compliance with anti-hypertensives especially ARB/ACEI    4. Dyslipidemia : continue Mevacor 40 mg hs .  Patient is educated about benefits and adverse effects of statins and explained how benefits outweigh risk. 5. use of aspirin to prevent MI and TIA's discussed      6. Multi nodular goiter :   Thyroid usg - dec 2015 , Left side he has 3 nodules,  1.5 cm dominant nodule and then one mid level 1.2 cm and one 0.6 cm on upper pole   Right side upper pole it is 1.2 cm   He needs a f/u usg       7. Anemia - pcp will follow       8.  Decreased Mental alertness  And suggesting eval to rule out dementia   From the history provided by wife, pt is not reacting to drink or eat at onset of hypoglycemic symptoms   I have emphasized him that he always drives with some body in car   Educated wife that she has to make herself available as he needs guidance    9 he is posted for spinal stenosis surgery -  Maintenance of blood sugars is importance for good recovery         > 50 % of time is spent on counseling

## 2017-08-31 ENCOUNTER — ANESTHESIA EVENT (OUTPATIENT)
Dept: SURGERY | Age: 77
DRG: 460 | End: 2017-08-31
Payer: MEDICARE

## 2017-08-31 ENCOUNTER — APPOINTMENT (OUTPATIENT)
Dept: GENERAL RADIOLOGY | Age: 77
DRG: 460 | End: 2017-08-31
Attending: ORTHOPAEDIC SURGERY
Payer: MEDICARE

## 2017-08-31 ENCOUNTER — HOSPITAL ENCOUNTER (INPATIENT)
Age: 77
LOS: 5 days | Discharge: HOME HEALTH | DRG: 460 | End: 2017-09-05
Attending: ORTHOPAEDIC SURGERY | Admitting: ORTHOPAEDIC SURGERY
Payer: MEDICARE

## 2017-08-31 ENCOUNTER — ANESTHESIA (OUTPATIENT)
Dept: SURGERY | Age: 77
DRG: 460 | End: 2017-08-31
Payer: MEDICARE

## 2017-08-31 PROBLEM — M48.061 SPINAL STENOSIS OF LUMBAR REGION: Status: ACTIVE | Noted: 2017-08-31

## 2017-08-31 LAB
ABO + RH BLD: NORMAL
BLOOD GROUP ANTIBODIES SERPL: NORMAL
GLUCOSE BLD STRIP.AUTO-MCNC: 167 MG/DL (ref 65–100)
GLUCOSE BLD STRIP.AUTO-MCNC: 173 MG/DL (ref 65–100)
GLUCOSE BLD STRIP.AUTO-MCNC: 207 MG/DL (ref 65–100)
GLUCOSE BLD STRIP.AUTO-MCNC: 261 MG/DL (ref 65–100)
HGB BLD-MCNC: 10.8 G/DL (ref 12.1–17)
SERVICE CMNT-IMP: ABNORMAL
SPECIMEN EXP DATE BLD: NORMAL

## 2017-08-31 PROCEDURE — 77030035339 HC CLMP RENSNCE KT DISP MAZR -G1: Performed by: ORTHOPAEDIC SURGERY

## 2017-08-31 PROCEDURE — 77030004391 HC BUR FLUT MEDT -C: Performed by: ORTHOPAEDIC SURGERY

## 2017-08-31 PROCEDURE — 77030018836 HC SOL IRR NACL ICUM -A: Performed by: ORTHOPAEDIC SURGERY

## 2017-08-31 PROCEDURE — 74011636637 HC RX REV CODE- 636/637: Performed by: ANESTHESIOLOGY

## 2017-08-31 PROCEDURE — 74011250637 HC RX REV CODE- 250/637: Performed by: PHYSICIAN ASSISTANT

## 2017-08-31 PROCEDURE — 74011000250 HC RX REV CODE- 250

## 2017-08-31 PROCEDURE — 86900 BLOOD TYPING SEROLOGIC ABO: CPT | Performed by: ORTHOPAEDIC SURGERY

## 2017-08-31 PROCEDURE — 74011250636 HC RX REV CODE- 250/636: Performed by: ORTHOPAEDIC SURGERY

## 2017-08-31 PROCEDURE — 0SG1071 FUSION OF 2 OR MORE LUMBAR VERTEBRAL JOINTS WITH AUTOLOGOUS TISSUE SUBSTITUTE, POSTERIOR APPROACH, POSTERIOR COLUMN, OPEN APPROACH: ICD-10-PCS | Performed by: ORTHOPAEDIC SURGERY

## 2017-08-31 PROCEDURE — 77030012406 HC DRN WND PENRS BARD -A: Performed by: ORTHOPAEDIC SURGERY

## 2017-08-31 PROCEDURE — 74011250636 HC RX REV CODE- 250/636: Performed by: ANESTHESIOLOGY

## 2017-08-31 PROCEDURE — 74011000250 HC RX REV CODE- 250: Performed by: ORTHOPAEDIC SURGERY

## 2017-08-31 PROCEDURE — 74011250636 HC RX REV CODE- 250/636: Performed by: PHYSICIAN ASSISTANT

## 2017-08-31 PROCEDURE — 74011000258 HC RX REV CODE- 258

## 2017-08-31 PROCEDURE — 77030029099 HC BN WAX SSPC -A: Performed by: ORTHOPAEDIC SURGERY

## 2017-08-31 PROCEDURE — 77030032490 HC SLV COMPR SCD KNE COVD -B: Performed by: ORTHOPAEDIC SURGERY

## 2017-08-31 PROCEDURE — 77030008684 HC TU ET CUF COVD -B: Performed by: ANESTHESIOLOGY

## 2017-08-31 PROCEDURE — 77030003666 HC NDL SPINAL BD -A: Performed by: ORTHOPAEDIC SURGERY

## 2017-08-31 PROCEDURE — 77030034475 HC MISC IMPL SPN: Performed by: ORTHOPAEDIC SURGERY

## 2017-08-31 PROCEDURE — 77030026438 HC STYL ET INTUB CARD -A: Performed by: ANESTHESIOLOGY

## 2017-08-31 PROCEDURE — 77030013079 HC BLNKT BAIR HGGR 3M -A: Performed by: ANESTHESIOLOGY

## 2017-08-31 PROCEDURE — 74011250636 HC RX REV CODE- 250/636

## 2017-08-31 PROCEDURE — 82962 GLUCOSE BLOOD TEST: CPT

## 2017-08-31 PROCEDURE — 77030019908 HC STETH ESOPH SIMS -A: Performed by: ANESTHESIOLOGY

## 2017-08-31 PROCEDURE — 77030003196 HC GRFT RECOMB BN MEDT -K1: Performed by: ORTHOPAEDIC SURGERY

## 2017-08-31 PROCEDURE — 77030003029 HC SUT VCRL J&J -B: Performed by: ORTHOPAEDIC SURGERY

## 2017-08-31 PROCEDURE — 77030035340: Performed by: ORTHOPAEDIC SURGERY

## 2017-08-31 PROCEDURE — 07DR3ZZ EXTRACTION OF ILIAC BONE MARROW, PERCUTANEOUS APPROACH: ICD-10-PCS | Performed by: ORTHOPAEDIC SURGERY

## 2017-08-31 PROCEDURE — 76010000879 HC OR TIME 3.5 TO 4HR INTENSV - TIER 2: Performed by: ORTHOPAEDIC SURGERY

## 2017-08-31 PROCEDURE — 65270000029 HC RM PRIVATE

## 2017-08-31 PROCEDURE — 77030012893

## 2017-08-31 PROCEDURE — 76001 XR FLUOROSCOPY OVER 60 MINUTES: CPT

## 2017-08-31 PROCEDURE — 74011000272 HC RX REV CODE- 272: Performed by: ORTHOPAEDIC SURGERY

## 2017-08-31 PROCEDURE — 74011250637 HC RX REV CODE- 250/637: Performed by: ORTHOPAEDIC SURGERY

## 2017-08-31 PROCEDURE — 77030011264 HC ELECTRD BLD EXT COVD -A: Performed by: ORTHOPAEDIC SURGERY

## 2017-08-31 PROCEDURE — 36415 COLL VENOUS BLD VENIPUNCTURE: CPT | Performed by: ORTHOPAEDIC SURGERY

## 2017-08-31 PROCEDURE — 76210000017 HC OR PH I REC 1.5 TO 2 HR: Performed by: ORTHOPAEDIC SURGERY

## 2017-08-31 PROCEDURE — 85018 HEMOGLOBIN: CPT

## 2017-08-31 PROCEDURE — 76060000038 HC ANESTHESIA 3.5 TO 4 HR: Performed by: ORTHOPAEDIC SURGERY

## 2017-08-31 PROCEDURE — 77030010507 HC ADH SKN DERMBND J&J -B: Performed by: ORTHOPAEDIC SURGERY

## 2017-08-31 PROCEDURE — 72100 X-RAY EXAM L-S SPINE 2/3 VWS: CPT

## 2017-08-31 PROCEDURE — 77030005518 HC CATH URETH FOL 2W BARD -B: Performed by: ORTHOPAEDIC SURGERY

## 2017-08-31 PROCEDURE — C1713 ANCHOR/SCREW BN/BN,TIS/BN: HCPCS | Performed by: ORTHOPAEDIC SURGERY

## 2017-08-31 PROCEDURE — 77030035129: Performed by: ORTHOPAEDIC SURGERY

## 2017-08-31 PROCEDURE — 74011636637 HC RX REV CODE- 636/637: Performed by: NURSE PRACTITIONER

## 2017-08-31 PROCEDURE — 77030032828 HC GRFT DBM FBR VESUVUS 30ML K2M -I1: Performed by: ORTHOPAEDIC SURGERY

## 2017-08-31 PROCEDURE — 77030020268 HC MISC GENERAL SUPPLY: Performed by: ORTHOPAEDIC SURGERY

## 2017-08-31 PROCEDURE — 0SP004Z REMOVAL OF INTERNAL FIXATION DEVICE FROM LUMBAR VERTEBRAL JOINT, OPEN APPROACH: ICD-10-PCS | Performed by: ORTHOPAEDIC SURGERY

## 2017-08-31 PROCEDURE — 77030031139 HC SUT VCRL2 J&J -A: Performed by: ORTHOPAEDIC SURGERY

## 2017-08-31 PROCEDURE — 77030020782 HC GWN BAIR PAWS FLX 3M -B

## 2017-08-31 DEVICE — BONE GRAFT KIT 7510600 INFUSE LARGE
Type: IMPLANTABLE DEVICE | Site: SPINE LUMBAR | Status: FUNCTIONAL
Brand: INFUSE® BONE GRAFT

## 2017-08-31 DEVICE — GRAFT BNE SUB 30CC DEMIN BNE MTRX FBR FOR OSTEOBIOLOGICAL: Type: IMPLANTABLE DEVICE | Site: SPINE LUMBAR | Status: FUNCTIONAL

## 2017-08-31 DEVICE — BLOCKS 7610310 MSTRGRFT MATRIX EXT 10CC
Type: IMPLANTABLE DEVICE | Site: SPINE LUMBAR | Status: FUNCTIONAL
Brand: MASTERGRAFT® MATRIX EXT

## 2017-08-31 RX ORDER — ONDANSETRON 2 MG/ML
INJECTION INTRAMUSCULAR; INTRAVENOUS AS NEEDED
Status: DISCONTINUED | OUTPATIENT
Start: 2017-08-31 | End: 2017-08-31 | Stop reason: HOSPADM

## 2017-08-31 RX ORDER — OMEPRAZOLE 40 MG/1
40 CAPSULE, DELAYED RELEASE ORAL DAILY
Status: DISCONTINUED | OUTPATIENT
Start: 2017-09-01 | End: 2017-08-31 | Stop reason: CLARIF

## 2017-08-31 RX ORDER — MELATONIN
1000 DAILY
Status: DISCONTINUED | OUTPATIENT
Start: 2017-09-01 | End: 2017-09-05 | Stop reason: HOSPADM

## 2017-08-31 RX ORDER — CEFAZOLIN SODIUM IN 0.9 % NACL 2 G/50 ML
2 INTRAVENOUS SOLUTION, PIGGYBACK (ML) INTRAVENOUS EVERY 8 HOURS
Status: COMPLETED | OUTPATIENT
Start: 2017-08-31 | End: 2017-08-31

## 2017-08-31 RX ORDER — ONDANSETRON 2 MG/ML
4 INJECTION INTRAMUSCULAR; INTRAVENOUS AS NEEDED
Status: DISCONTINUED | OUTPATIENT
Start: 2017-08-31 | End: 2017-08-31 | Stop reason: HOSPADM

## 2017-08-31 RX ORDER — SODIUM CHLORIDE 0.9 % (FLUSH) 0.9 %
5-10 SYRINGE (ML) INJECTION AS NEEDED
Status: DISCONTINUED | OUTPATIENT
Start: 2017-08-31 | End: 2017-08-31 | Stop reason: HOSPADM

## 2017-08-31 RX ORDER — FOLIC ACID 1 MG/1
1 TABLET ORAL DAILY
Status: DISCONTINUED | OUTPATIENT
Start: 2017-09-01 | End: 2017-09-05 | Stop reason: HOSPADM

## 2017-08-31 RX ORDER — SODIUM CHLORIDE 0.9 % (FLUSH) 0.9 %
5-10 SYRINGE (ML) INJECTION AS NEEDED
Status: DISCONTINUED | OUTPATIENT
Start: 2017-08-31 | End: 2017-09-05 | Stop reason: HOSPADM

## 2017-08-31 RX ORDER — DEXTROSE 50 % IN WATER (D50W) INTRAVENOUS SYRINGE
12.5-25 AS NEEDED
Status: DISCONTINUED | OUTPATIENT
Start: 2017-08-31 | End: 2017-08-31 | Stop reason: SDUPTHER

## 2017-08-31 RX ORDER — SUCCINYLCHOLINE CHLORIDE 20 MG/ML
INJECTION INTRAMUSCULAR; INTRAVENOUS AS NEEDED
Status: DISCONTINUED | OUTPATIENT
Start: 2017-08-31 | End: 2017-08-31 | Stop reason: HOSPADM

## 2017-08-31 RX ORDER — ROCURONIUM BROMIDE 10 MG/ML
INJECTION, SOLUTION INTRAVENOUS AS NEEDED
Status: DISCONTINUED | OUTPATIENT
Start: 2017-08-31 | End: 2017-08-31 | Stop reason: HOSPADM

## 2017-08-31 RX ORDER — FENTANYL CITRATE 50 UG/ML
25 INJECTION, SOLUTION INTRAMUSCULAR; INTRAVENOUS
Status: DISCONTINUED | OUTPATIENT
Start: 2017-08-31 | End: 2017-08-31 | Stop reason: HOSPADM

## 2017-08-31 RX ORDER — CALCIUM CARBONATE 200(500)MG
200-400 TABLET,CHEWABLE ORAL
Status: DISCONTINUED | OUTPATIENT
Start: 2017-08-31 | End: 2017-09-05 | Stop reason: HOSPADM

## 2017-08-31 RX ORDER — SODIUM CHLORIDE 0.9 % (FLUSH) 0.9 %
5-10 SYRINGE (ML) INJECTION EVERY 8 HOURS
Status: DISCONTINUED | OUTPATIENT
Start: 2017-09-01 | End: 2017-09-05 | Stop reason: HOSPADM

## 2017-08-31 RX ORDER — FACIAL-BODY WIPES
10 EACH TOPICAL DAILY PRN
Status: DISCONTINUED | OUTPATIENT
Start: 2017-09-02 | End: 2017-09-05 | Stop reason: HOSPADM

## 2017-08-31 RX ORDER — ACETAMINOPHEN 325 MG/1
650 TABLET ORAL ONCE
Status: DISCONTINUED | OUTPATIENT
Start: 2017-08-31 | End: 2017-08-31 | Stop reason: HOSPADM

## 2017-08-31 RX ORDER — INSULIN GLARGINE 100 [IU]/ML
15 INJECTION, SOLUTION SUBCUTANEOUS DAILY
Status: DISCONTINUED | OUTPATIENT
Start: 2017-08-31 | End: 2017-08-31

## 2017-08-31 RX ORDER — HYDROMORPHONE HYDROCHLORIDE 2 MG/ML
INJECTION, SOLUTION INTRAMUSCULAR; INTRAVENOUS; SUBCUTANEOUS AS NEEDED
Status: DISCONTINUED | OUTPATIENT
Start: 2017-08-31 | End: 2017-08-31 | Stop reason: HOSPADM

## 2017-08-31 RX ORDER — ACETAMINOPHEN 325 MG/1
650 TABLET ORAL
Status: DISCONTINUED | OUTPATIENT
Start: 2017-08-31 | End: 2017-09-05 | Stop reason: HOSPADM

## 2017-08-31 RX ORDER — CALCIUM CARBONATE 200(500)MG
400 TABLET,CHEWABLE ORAL
Status: DISCONTINUED | OUTPATIENT
Start: 2017-08-31 | End: 2017-08-31

## 2017-08-31 RX ORDER — LOSARTAN POTASSIUM 50 MG/1
50 TABLET ORAL DAILY
Status: DISCONTINUED | OUTPATIENT
Start: 2017-09-01 | End: 2017-09-05 | Stop reason: HOSPADM

## 2017-08-31 RX ORDER — LIDOCAINE HYDROCHLORIDE 20 MG/ML
INJECTION, SOLUTION EPIDURAL; INFILTRATION; INTRACAUDAL; PERINEURAL AS NEEDED
Status: DISCONTINUED | OUTPATIENT
Start: 2017-08-31 | End: 2017-08-31 | Stop reason: HOSPADM

## 2017-08-31 RX ORDER — MAGNESIUM SULFATE 100 %
4 CRYSTALS MISCELLANEOUS AS NEEDED
Status: DISCONTINUED | OUTPATIENT
Start: 2017-08-31 | End: 2017-09-05 | Stop reason: HOSPADM

## 2017-08-31 RX ORDER — INSULIN LISPRO 100 [IU]/ML
INJECTION, SOLUTION INTRAVENOUS; SUBCUTANEOUS
Status: DISCONTINUED | OUTPATIENT
Start: 2017-08-31 | End: 2017-09-05 | Stop reason: HOSPADM

## 2017-08-31 RX ORDER — HYDROMORPHONE HYDROCHLORIDE 4 MG/1
4 TABLET ORAL
Status: DISCONTINUED | OUTPATIENT
Start: 2017-08-31 | End: 2017-09-01

## 2017-08-31 RX ORDER — HYDROMORPHONE HYDROCHLORIDE 1 MG/ML
0.5 INJECTION, SOLUTION INTRAMUSCULAR; INTRAVENOUS; SUBCUTANEOUS
Status: ACTIVE | OUTPATIENT
Start: 2017-08-31 | End: 2017-09-01

## 2017-08-31 RX ORDER — SODIUM CHLORIDE 9 MG/ML
125 INJECTION, SOLUTION INTRAVENOUS CONTINUOUS
Status: DISPENSED | OUTPATIENT
Start: 2017-08-31 | End: 2017-09-01

## 2017-08-31 RX ORDER — TRAMADOL HYDROCHLORIDE 50 MG/1
50 TABLET ORAL
Status: DISCONTINUED | OUTPATIENT
Start: 2017-08-31 | End: 2017-09-05 | Stop reason: HOSPADM

## 2017-08-31 RX ORDER — ONDANSETRON 2 MG/ML
4 INJECTION INTRAMUSCULAR; INTRAVENOUS
Status: DISCONTINUED | OUTPATIENT
Start: 2017-08-31 | End: 2017-09-05 | Stop reason: HOSPADM

## 2017-08-31 RX ORDER — INSULIN GLARGINE 100 [IU]/ML
10 INJECTION, SOLUTION SUBCUTANEOUS DAILY
Status: DISCONTINUED | OUTPATIENT
Start: 2017-08-31 | End: 2017-09-05

## 2017-08-31 RX ORDER — FENTANYL CITRATE 50 UG/ML
INJECTION, SOLUTION INTRAMUSCULAR; INTRAVENOUS AS NEEDED
Status: DISCONTINUED | OUTPATIENT
Start: 2017-08-31 | End: 2017-08-31 | Stop reason: HOSPADM

## 2017-08-31 RX ORDER — SODIUM CHLORIDE 9 MG/ML
25 INJECTION, SOLUTION INTRAVENOUS CONTINUOUS
Status: DISCONTINUED | OUTPATIENT
Start: 2017-08-31 | End: 2017-08-31 | Stop reason: HOSPADM

## 2017-08-31 RX ORDER — FENTANYL CITRATE 50 UG/ML
50 INJECTION, SOLUTION INTRAMUSCULAR; INTRAVENOUS AS NEEDED
Status: DISCONTINUED | OUTPATIENT
Start: 2017-08-31 | End: 2017-08-31 | Stop reason: HOSPADM

## 2017-08-31 RX ORDER — PANTOPRAZOLE SODIUM 40 MG/1
40 TABLET, DELAYED RELEASE ORAL
Status: DISCONTINUED | OUTPATIENT
Start: 2017-09-01 | End: 2017-09-05 | Stop reason: HOSPADM

## 2017-08-31 RX ORDER — SODIUM CHLORIDE, SODIUM LACTATE, POTASSIUM CHLORIDE, CALCIUM CHLORIDE 600; 310; 30; 20 MG/100ML; MG/100ML; MG/100ML; MG/100ML
125 INJECTION, SOLUTION INTRAVENOUS CONTINUOUS
Status: DISCONTINUED | OUTPATIENT
Start: 2017-08-31 | End: 2017-08-31 | Stop reason: HOSPADM

## 2017-08-31 RX ORDER — DIPHENHYDRAMINE HYDROCHLORIDE 50 MG/ML
12.5 INJECTION, SOLUTION INTRAMUSCULAR; INTRAVENOUS AS NEEDED
Status: DISCONTINUED | OUTPATIENT
Start: 2017-08-31 | End: 2017-08-31 | Stop reason: HOSPADM

## 2017-08-31 RX ORDER — HYDROMORPHONE HYDROCHLORIDE 1 MG/ML
0.2 INJECTION, SOLUTION INTRAMUSCULAR; INTRAVENOUS; SUBCUTANEOUS
Status: DISCONTINUED | OUTPATIENT
Start: 2017-08-31 | End: 2017-08-31 | Stop reason: HOSPADM

## 2017-08-31 RX ORDER — TAMSULOSIN HYDROCHLORIDE 0.4 MG/1
0.4 CAPSULE ORAL
Status: DISCONTINUED | OUTPATIENT
Start: 2017-08-31 | End: 2017-09-05 | Stop reason: HOSPADM

## 2017-08-31 RX ORDER — PROPOFOL 10 MG/ML
INJECTION, EMULSION INTRAVENOUS AS NEEDED
Status: DISCONTINUED | OUTPATIENT
Start: 2017-08-31 | End: 2017-08-31 | Stop reason: HOSPADM

## 2017-08-31 RX ORDER — SODIUM CHLORIDE 0.9 % (FLUSH) 0.9 %
5-10 SYRINGE (ML) INJECTION EVERY 8 HOURS
Status: DISCONTINUED | OUTPATIENT
Start: 2017-08-31 | End: 2017-08-31 | Stop reason: HOSPADM

## 2017-08-31 RX ORDER — MIDAZOLAM HYDROCHLORIDE 1 MG/ML
1 INJECTION, SOLUTION INTRAMUSCULAR; INTRAVENOUS AS NEEDED
Status: DISCONTINUED | OUTPATIENT
Start: 2017-08-31 | End: 2017-08-31 | Stop reason: HOSPADM

## 2017-08-31 RX ORDER — SODIUM CHLORIDE, SODIUM LACTATE, POTASSIUM CHLORIDE, CALCIUM CHLORIDE 600; 310; 30; 20 MG/100ML; MG/100ML; MG/100ML; MG/100ML
25 INJECTION, SOLUTION INTRAVENOUS CONTINUOUS
Status: DISCONTINUED | OUTPATIENT
Start: 2017-08-31 | End: 2017-08-31 | Stop reason: HOSPADM

## 2017-08-31 RX ORDER — LIDOCAINE HYDROCHLORIDE 10 MG/ML
0.1 INJECTION, SOLUTION EPIDURAL; INFILTRATION; INTRACAUDAL; PERINEURAL AS NEEDED
Status: DISCONTINUED | OUTPATIENT
Start: 2017-08-31 | End: 2017-08-31 | Stop reason: HOSPADM

## 2017-08-31 RX ORDER — LOVASTATIN 20 MG/1
40 TABLET ORAL EVERY EVENING
Status: DISCONTINUED | OUTPATIENT
Start: 2017-08-31 | End: 2017-09-05 | Stop reason: HOSPADM

## 2017-08-31 RX ORDER — POLYETHYLENE GLYCOL 3350 17 G/17G
17 POWDER, FOR SOLUTION ORAL DAILY
Status: DISCONTINUED | OUTPATIENT
Start: 2017-09-01 | End: 2017-09-05 | Stop reason: HOSPADM

## 2017-08-31 RX ORDER — DEXTROSE 50 % IN WATER (D50W) INTRAVENOUS SYRINGE
12.5-25 AS NEEDED
Status: DISCONTINUED | OUTPATIENT
Start: 2017-08-31 | End: 2017-09-05 | Stop reason: HOSPADM

## 2017-08-31 RX ORDER — CEFAZOLIN SODIUM IN 0.9 % NACL 2 G/50 ML
2 INTRAVENOUS SOLUTION, PIGGYBACK (ML) INTRAVENOUS ONCE
Status: COMPLETED | OUTPATIENT
Start: 2017-08-31 | End: 2017-08-31

## 2017-08-31 RX ORDER — AMOXICILLIN 250 MG
1 CAPSULE ORAL 2 TIMES DAILY
Status: DISCONTINUED | OUTPATIENT
Start: 2017-08-31 | End: 2017-09-05 | Stop reason: HOSPADM

## 2017-08-31 RX ORDER — MIDAZOLAM HYDROCHLORIDE 1 MG/ML
0.5 INJECTION, SOLUTION INTRAMUSCULAR; INTRAVENOUS
Status: DISCONTINUED | OUTPATIENT
Start: 2017-08-31 | End: 2017-08-31 | Stop reason: HOSPADM

## 2017-08-31 RX ORDER — NALOXONE HYDROCHLORIDE 0.4 MG/ML
0.4 INJECTION, SOLUTION INTRAMUSCULAR; INTRAVENOUS; SUBCUTANEOUS AS NEEDED
Status: DISCONTINUED | OUTPATIENT
Start: 2017-08-31 | End: 2017-09-05 | Stop reason: HOSPADM

## 2017-08-31 RX ORDER — HYDROMORPHONE HCL IN 0.9% NACL 15 MG/30ML
PATIENT CONTROLLED ANALGESIA VIAL INTRAVENOUS
Status: DISPENSED
Start: 2017-08-31 | End: 2017-09-01

## 2017-08-31 RX ORDER — BUPIVACAINE HYDROCHLORIDE AND EPINEPHRINE 2.5; 5 MG/ML; UG/ML
INJECTION, SOLUTION EPIDURAL; INFILTRATION; INTRACAUDAL; PERINEURAL AS NEEDED
Status: DISCONTINUED | OUTPATIENT
Start: 2017-08-31 | End: 2017-08-31 | Stop reason: HOSPADM

## 2017-08-31 RX ORDER — OXYCODONE HYDROCHLORIDE 5 MG/1
5 TABLET ORAL AS NEEDED
Status: DISCONTINUED | OUTPATIENT
Start: 2017-08-31 | End: 2017-08-31 | Stop reason: HOSPADM

## 2017-08-31 RX ORDER — OXYCODONE HYDROCHLORIDE 5 MG/1
5-10 TABLET ORAL
Status: DISCONTINUED | OUTPATIENT
Start: 2017-08-31 | End: 2017-09-01

## 2017-08-31 RX ORDER — HYDROMORPHONE HCL IN 0.9% NACL 15 MG/30ML
PATIENT CONTROLLED ANALGESIA VIAL INTRAVENOUS CONTINUOUS
Status: DISCONTINUED | OUTPATIENT
Start: 2017-08-31 | End: 2017-09-01

## 2017-08-31 RX ORDER — MORPHINE SULFATE 10 MG/ML
2 INJECTION, SOLUTION INTRAMUSCULAR; INTRAVENOUS
Status: DISCONTINUED | OUTPATIENT
Start: 2017-08-31 | End: 2017-08-31 | Stop reason: HOSPADM

## 2017-08-31 RX ADMIN — HYDROMORPHONE HYDROCHLORIDE 0.25 MG: 2 INJECTION, SOLUTION INTRAMUSCULAR; INTRAVENOUS; SUBCUTANEOUS at 09:32

## 2017-08-31 RX ADMIN — LIDOCAINE HYDROCHLORIDE 80 MG: 20 INJECTION, SOLUTION EPIDURAL; INFILTRATION; INTRACAUDAL; PERINEURAL at 07:47

## 2017-08-31 RX ADMIN — CALCIUM CARBONATE (ANTACID) CHEW TAB 500 MG 400 MG: 500 CHEW TAB at 21:57

## 2017-08-31 RX ADMIN — HUMAN INSULIN 3 UNITS: 100 INJECTION, SOLUTION SUBCUTANEOUS at 12:29

## 2017-08-31 RX ADMIN — CEFAZOLIN 2 G: 1 INJECTION, POWDER, FOR SOLUTION INTRAMUSCULAR; INTRAVENOUS; PARENTERAL at 22:00

## 2017-08-31 RX ADMIN — SENNOSIDES AND DOCUSATE SODIUM 1 TABLET: 8.6; 5 TABLET ORAL at 19:10

## 2017-08-31 RX ADMIN — FENTANYL CITRATE 25 MCG: 50 INJECTION, SOLUTION INTRAMUSCULAR; INTRAVENOUS at 11:05

## 2017-08-31 RX ADMIN — PROPOFOL 120 MG: 10 INJECTION, EMULSION INTRAVENOUS at 07:47

## 2017-08-31 RX ADMIN — TAMSULOSIN HYDROCHLORIDE 0.4 MG: 0.4 CAPSULE ORAL at 21:58

## 2017-08-31 RX ADMIN — ONDANSETRON 4 MG: 2 INJECTION INTRAMUSCULAR; INTRAVENOUS at 10:59

## 2017-08-31 RX ADMIN — ROCURONIUM BROMIDE 5 MG: 10 INJECTION, SOLUTION INTRAVENOUS at 07:47

## 2017-08-31 RX ADMIN — SODIUM CHLORIDE 125 ML/HR: 900 INJECTION, SOLUTION INTRAVENOUS at 12:25

## 2017-08-31 RX ADMIN — Medication 10 ML: at 15:55

## 2017-08-31 RX ADMIN — FENTANYL CITRATE 50 MCG: 50 INJECTION, SOLUTION INTRAMUSCULAR; INTRAVENOUS at 07:47

## 2017-08-31 RX ADMIN — FENTANYL CITRATE 50 MCG: 50 INJECTION, SOLUTION INTRAMUSCULAR; INTRAVENOUS at 07:39

## 2017-08-31 RX ADMIN — CEFAZOLIN 2 G: 1 INJECTION, POWDER, FOR SOLUTION INTRAMUSCULAR; INTRAVENOUS; PARENTERAL at 07:51

## 2017-08-31 RX ADMIN — HYDROMORPHONE HYDROCHLORIDE 0.5 MG: 2 INJECTION, SOLUTION INTRAMUSCULAR; INTRAVENOUS; SUBCUTANEOUS at 10:45

## 2017-08-31 RX ADMIN — LOVASTATIN 40 MG: 20 TABLET ORAL at 19:10

## 2017-08-31 RX ADMIN — INSULIN GLARGINE 10 UNITS: 100 INJECTION, SOLUTION SUBCUTANEOUS at 19:10

## 2017-08-31 RX ADMIN — Medication: at 12:42

## 2017-08-31 RX ADMIN — CEFAZOLIN 2 G: 1 INJECTION, POWDER, FOR SOLUTION INTRAMUSCULAR; INTRAVENOUS; PARENTERAL at 15:55

## 2017-08-31 RX ADMIN — HYDROMORPHONE HYDROCHLORIDE 0.25 MG: 2 INJECTION, SOLUTION INTRAMUSCULAR; INTRAVENOUS; SUBCUTANEOUS at 08:47

## 2017-08-31 RX ADMIN — INSULIN LISPRO 3 UNITS: 100 INJECTION, SOLUTION INTRAVENOUS; SUBCUTANEOUS at 23:44

## 2017-08-31 RX ADMIN — FENTANYL CITRATE 25 MCG: 50 INJECTION, SOLUTION INTRAMUSCULAR; INTRAVENOUS at 11:01

## 2017-08-31 RX ADMIN — SODIUM CHLORIDE, POTASSIUM CHLORIDE, SODIUM LACTATE AND CALCIUM CHLORIDE: 600; 310; 30; 20 INJECTION, SOLUTION INTRAVENOUS at 07:40

## 2017-08-31 RX ADMIN — CALCIUM CARBONATE (ANTACID) CHEW TAB 500 MG 400 MG: 500 CHEW TAB at 21:55

## 2017-08-31 RX ADMIN — SUCCINYLCHOLINE CHLORIDE 140 MG: 20 INJECTION INTRAMUSCULAR; INTRAVENOUS at 07:47

## 2017-08-31 RX ADMIN — FENTANYL CITRATE 50 MCG: 50 INJECTION, SOLUTION INTRAMUSCULAR; INTRAVENOUS at 10:45

## 2017-08-31 RX ADMIN — SODIUM CHLORIDE 125 ML/HR: 900 INJECTION, SOLUTION INTRAVENOUS at 21:12

## 2017-08-31 NOTE — PERIOP NOTES
8:40 AM  Patient's wife, [place], contacted for a surgical update upon start of procedure.    Ramez Swann RN

## 2017-08-31 NOTE — IP AVS SNAPSHOT
[address] [facility] 57 
[phone] Patient: Triston Kinsey MRN: [id number] EDYTA:[birth year] You are allergic to the following Allergen Reactions Ace Inhibitors Cough Recent Documentation Height Weight BMI Smoking Status 1.702 m 61.8 kg 21.34 kg/m2 Never Smoker Emergency Contacts Name Discharge Info Relation Home Work Mobile [address] [facility] CAREGIVER [3] Spouse [3] [phone] About your hospitalization You were admitted on:  August 31, 2017 You last received care in the:  [address] [place] [address] You were discharged on:  September 5, 2017 Unit phone number:  [phone] Why you were hospitalized Your primary diagnosis was:  Not on File Your diagnoses also included:  Spinal Stenosis Of Lumbar Region Providers Seen During Your Hospitalizations Provider Role Specialty Primary office phone Darnell Morales MD Attending Provider Orthopedic Surgery [phone] Your Primary Care Physician (PCP) Primary Care Physician Office Phone Office Fax [facility] [phone] Follow-up Information Follow up With Details Comments Contact Info Inga Sandra MD   [address] [facility] [address] [place] [address] 
[phone] [facility]  call agency if you have not been contacted by indra he day after discharge to inquire as to the day and time of initial visit [address] [address] [phone] SAHIL Chaney Go on 9/12/2017 @9am. Please go to the [facility] location: [facility] [address], [address] [facility] [address] [place] [address] 
[phone] Belle Huff MD Schedule an appointment as soon as possible for a visit in 2 weeks Elevated blood sugars [address] [facility] [place] 
 [facility] [address] 
[phone] Current Discharge Medication List  
  
START taking these medications Dose & Instructions Dispensing Information Comments Morning Noon Evening Bedtime  
 traMADol 50 mg tablet Commonly known as:  ULTRAM  
   
Your last dose was: Your next dose is:    
   
   
 Dose:  50 mg Take 1 Tab by mouth every six (6) hours as needed. Max Daily Amount: 200 mg. Quantity:  50 Tab Refills:  0 CONTINUE these medications which have CHANGED Dose & Instructions Dispensing Information Comments Morning Noon Evening Bedtime  
 insulin glargine 100 unit/mL (3 mL) Inpn Commonly known as:  Larose Lies What changed:   
- how much to take - when to take this 
- additional instructions Your last dose was: Your next dose is:    
   
   
 Inject 20 units daily after breakfast.  
 Quantity:  15 mL Refills:  6  
     
   
   
   
  
 insulin lispro 100 unit/mL kwikpen Commonly known as:  HUMALOG What changed:   
- how much to take 
- how to take this - when to take this 
- additional instructions Your last dose was: Your next dose is:    
   
   
 Inject 3 units before breakfast, lunch, and dinner. Plus sliding scale Quantity:  15 mL Refills:  6 Max daily dose 10 units CONTINUE these medications which have NOT CHANGED Dose & Instructions Dispensing Information Comments Morning Noon Evening Bedtime  
 aspirin delayed-release 81 mg tablet Your last dose was: Your next dose is: Take  by mouth daily. Refills:  0  
     
   
   
   
  
 folic acid 1 mg tablet Commonly known as:  Google Your last dose was: Your next dose is:    
   
   
 Dose:  1 mg Take 1 Tab by mouth daily. Quantity:  90 Tab Refills:  3  
     
   
   
   
  
 glucose blood VI test strips strip Commonly known as:  Ascensia CONTOUR Your last dose was: Your next dose is:    
   
   
 Use as directed to test blood sugar 3 times daily - [id number] Quantity:  1 Package Refills:  11  
 Diagnosis-Type II or unspecified type diabetes mellitus without mention of complication, not stated as uncontrolled Primary 250.00 Insulin Needles (Disposable) 29 gauge x 1/2\" Ndle Commonly known as:  PEN NEEDLE, DIABETIC Your last dose was: Your next dose is:    
   
   
 Dose:  1 Package 1 Package by Does Not Apply route two (2) times a day. Quantity:  1 Each Refills:  11 Type II or unspecified type diabetes mellitus without mention of complication, not stated as uncontrolled     250.00 Lancets Misc Your last dose was: Your next dose is:    
   
   
 Contour lancets- test blood sugar 3 times daily Quantity:  1 Package Refills:  11  
 Diagnosis-Type II or unspecified type diabetes mellitus without mention of complication, not stated as uncontrolled Primary 250.00 Lancing Device Misc Your last dose was: Your next dose is: To use daily Dx Code E11.65 Quantity:  1 Each Refills:  1  
     
   
   
   
  
 losartan 50 mg tablet Commonly known as:  COZAAR Your last dose was: Your next dose is:    
   
   
 Dose:  50 mg Take 1 Tab by mouth daily. Quantity:  90 Tab Refills:  3  
     
   
   
   
  
 lovastatin 40 mg tablet Commonly known as:  MEVACOR Your last dose was: Your next dose is: TAKE 1 TABLET NIGHTLY Quantity:  90 Tab Refills:  1  
     
   
   
   
  
 metFORMIN 1,000 mg tablet Commonly known as:  GLUCOPHAGE Your last dose was: Your next dose is:    
   
   
 Dose:  1000 mg Take 1 Tab by mouth two (2) times a day. Quantity:  180 Tab Refills:  3 omeprazole 40 mg capsule Commonly known as:  PRILOSEC Your last dose was: Your next dose is: TAKE 1 CAPSULE BY MOUTH EVERYDAY Refills:  3  
     
   
   
   
  
 tamsulosin 0.4 mg capsule Commonly known as:  FLOMAX Your last dose was: Your next dose is:    
   
   
 Dose:  1 Cap Take 1 Cap by mouth nightly. Refills:  6 VITAMIN D3 1,000 unit Cap Generic drug:  cholecalciferol Your last dose was: Your next dose is: Take  by mouth daily. Refills:  0 ASK your doctor about these medications Dose & Instructions Dispensing Information Comments Morning Noon Evening Bedtime  
 ibuprofen 800 mg tablet Commonly known as:  MOTRIN Your last dose was: Your next dose is: TAKE 1 TABLET THREE TIMES A DAY ORALLY 30 DAY(S) Refills:  3 Where to Get Your Medications Information on where to get these meds will be given to you by the nurse or doctor. ! Ask your nurse or doctor about these medications  
  traMADol 50 mg tablet Discharge Instructions Patient meets criteria for BUNDLED PAYMENT  
for Care Improvement Initiative Criteria Contact Information for Orthopedic Nurse Navigator:     
ALOK Brewer, RN-BC 
Q:[phone] C:[phone] U:[phone] After Hospital Care Plan:  Discharge Instructions Lumbar Fusion Surgery Dr. Adenike Casey Patient Name: Talat Rodriguez Date of procedure: 8/31/2017 Date of discharge: 9/5/2017 Procedure: Procedure(s): 
ROBOTIC L2-L4 REVISION DECOMPRESSION, L2-L3 FUSION, ALLOGRAFT, BONE MORPHOGENIC PROTEIN, HIP ASPIRATE, REMOVAL OF HARDWARE   
PCP: Rosa M Goncalves MD 
 
Follow up appointments 
-follow up with Dr. Adenike Casey in 2 weeks.   Call [phone] to make an appointment as soon as you get home from the hospital. 
 
 When to call your Orthopaedic Surgeon: 
-Signs of infection-if your incision is red; continues to have drainage; drainage has a foul odor or if you have a persistent fever over 101 degrees for 24 hours 
-Nausea or vomiting, severe headache 
-Loss of bowel or bladder function, inability to urinate 
-Changes in sensation in your arms or legs (numbness, tingling, loss of color) -Increased weakness-greater than before your surgery 
-Severe pain or pain not relieved by medications 
-Signs of a blood clot in your leg-calf pain, tenderness, redness, swelling of lower leg When to call your Primary Care Physician: 
-Concerns about medical conditions such as diabetes, high blood pressure, asthma, congestive heart failure 
-Call if blood sugars are elevated, persistent headache or dizziness, coughing or congestion, constipation or diarrhea, burning with urination, abnormal heart rate When to call 911 and go to the nearest emergency room: 
-Acute onset of chest pain, shortness of breath, difficulty breathing Activity 
-You are going home a well person, be as active as possible. Your only exercise should be walking. Start with short frequent walks and increase your walking distance each day. 
-Limit the amount of time you sit to 20-30 minute intervals. Sitting for prolonged periods of time will be uncomfortable for you following surgery. 
-Do NOT lift anything over 5 pounds 
-Do NOT do any straining, twisting or bending 
-When you are in bed, you may lay on your back or on either side. Do NOT lie on your stomach Brace 
-If you have a back brace, you should wear your brace at all times when you are out of bed. Do not wear the brace while in bed or showering. 
-Remember to always wear a cotton t-shirt underneath your brace. 
-Do not bend or twist when your brace is off Diet 
-Resume usual diet; drink plenty of fluids; eat foods high in fiber -It is important to have regular bowel movements. Pain medications may cause constipation. You may want to take a stool softener (such as Senokot-S or Colace) to prevent constipation. 
 -If constipation occurs, take a laxative (such as Dulcolax tablets, Milk of Magnesia, or a suppository). Laxatives should only be used if the above preventable measures have failed and you still have not had a bowel movement after three days Driving 
-You may not drive or return to work until instructed by your physician. However, you may ride in the car for short periods of time. Incision Care 
-You may take brief showers but do not run the water run directly onto the wound. After showering or bathing, remove the wet dressing and gently blot the wound dry with a soft towel. 
-Do not rub or apply any lotions or ointments to your incision site.  
-Do not soak or scrub your wound 
-Keep a dry dressing (ABD and paper tape) on your incision and have it changed daily for 14 days after surgery; more often if your incision is draining. Have your caregiver wash their hands thoroughly before changing your dressing. 
-You will have absorbable sutures and steristrips (white tape) on your incision. Leave the steristrips on until they fall off. Showering 
-You may shower in approximately 4 days after your surgery.   
-Leave the dressing on during your shower. Do NOT allow the water to run directly onto your dressing. Once you get out of the shower, put on a dry dressing. 
-Reminder- your brace can be removed while showering. Remember to not bend or twist while your brace is off.   
-Do not take a tub bath. Preventing blood clots 
-You have been given T.E.D. stockings to wear. Continue to wear these for 7 days after your discharge. Put them on in the morning and take them off at night.   
-They are used to increase your circulation and prevent blood clots from forming in your legs -T.E.D. stockings can be machine washed, temperature not to exceed 160° F (71°C) and machine dried for 15 to 20 minutes, temperature not to exceed 250° F (121°C). Pain management 
-Take pain medication as prescribed; decrease the amount you use as your pain lessens 
-DO not wait until you are in extreme pain to take your medication. 
-Avoid alcoholic beverages while taking pain medication Pain Medication Safety DO: 
-Read the Medication Guide  
-Take your medicine exactly as prescribed  
-Store your medicine away from children and in a safe place  
-Flush unused medicine down the toilet  
-Call your healthcare provider for medical advice about side effects. You may report side effects to FDA at 6-397-FDA-1569.  
-Please be aware that many medications contain Tylenol. We do not want you to over medicate so please read the information below as a guide. Do not take more than 4 Grams of Tylenol in a 24 hour period. (There are 1000 milligrams in one Gram) Percocet contains 325 mg of Tylenol per tablet (do not take more than 12 tablets in 24 hours) Lortab contains 500 mg of Tylenol per tablet (do not take more than 8 tablets in 24 hours) Norco contains 325 mg of Tylenol per tablet (do not take more than 12 tablets in 24 hours). DO NOT: 
-Do not give your medicine to others  
-Do not take medicine unless it was prescribed for you  
-Do not stop taking your medicine without talking to your healthcare provider  
-Do not break, chew, crush, dissolve, or inject your medicine. If you cannot swallow your medicine whole, talk to your healthcare provider. -Do not drink alcohol while taking this medicine 
-Do not take anti-inflammatory medications or aspirin unless instructed by your      Physician. Learning About Urinary Catheter Care to Prevent Infection What is a urinary catheter? A urinary catheter is a flexible plastic tube used to drain urine from your bladder when you can't urinate on your own. The catheter allows urine to drain from the bladder into a bag. Two types of drainage bags may be used with a urinary catheter. · A bedside bag is a large bag that you can hang on the side of your bed or on a chair. You can use it overnight or anytime you will be sitting or lying down for a long time. · A leg bag is a small bag that you can use during the day. It is usually attached to your thigh or calf and hidden under your clothes. Having a urinary catheter increases your risk of getting a urinary tract infection. Germs may get on the catheter and cause an infection in your bladder or kidneys. The longer you have a catheter, the more likely it is that you will get an infection. You can help prevent this problem with good hygiene and careful handling of your catheter and drainage bags. How can you help prevent infection? Take care to be clean · Always wash your hands well before and after you handle your catheter. · Clean the skin around the catheter twice a day using soap and water. Dry with a clean towel afterward. You can shower with your catheter and drainage bag in place unless your doctor told you not to. · When you clean around the catheter, check the surrounding skin for signs of infection. Look for things like pus or irritated, swollen, red, or tender skin around the catheter. Be careful with your drainage bag · Always keep the drainage bag below the level of your bladder. This will help keep urine from flowing back into your bladder. · Check often to see that urine is flowing through the catheter into the drainage bag. · Empty the drainage bag when it is half full. This will keep it from overflowing or backing up. · When you empty the drainage bag, do not let the tubing or drain spout touch anything. Be careful with your catheter · Do not unhook the catheter from the drain tube. That could let germs get into the tube. · Make sure that the catheter tubing does not get twisted or kinked. · Do not tug or pull on the catheter. And make sure that the drainage bag does not drag or pull on the catheter. · Do not put powder or lotion on the skin around the catheter. · Talk with your doctor about your options for sexual intercourse while wearing a catheter. How do you empty a urine drainage bag? If your doctor has asked you to keep a record, write down the amount of urine in the bag before you empty it. Wash your hands before and after you touch the bag. 1. Remove the drain spout from its sleeve at the bottom of the drainage bag. 
2. Open the valve on the drain spout. Let the urine flow out into the toilet or a container. Be careful not to let the tubing or drain spout touch anything. 3. After you empty the bag, wipe off any liquid on the end of the drain spout. Close the valve. Then put the drain spout back into its sleeve at the bottom of the collection bag. How do you add a bedside bag to a leg bag? Wash your hands before and after you handle the bags. 1. Empty the leg bag attached to the catheter. 2. Put a clean towel under the leg bag. 
3. Use an alcohol wipe to clean the tip of the bedside bag. Then connect the bedside bag to the leg bag. How can you clean a bedside drainage bag? Many people clean their bedside bag in the morning if they switch to a leg bag. To clean a bedside drainage ba. Remove the bedside bag from the leg bag. 
2. Fill the bag with 2 parts vinegar and 3 parts water. Let it stand for 20 minutes. 3. Empty the bag, and let it air dry. When should you call for help? Call your doctor now or seek immediate medical care if: 
· You have symptoms of a urinary infection. These may include: 
¨ Pain or burning when you urinate. ¨ A frequent need to urinate without being able to pass much urine. ¨ Pain in the flank, which is just below the rib cage and above the waist on either side of the back. ¨ Blood in your urine. ¨ A fever. · Your urine smells bad. · You see large blood clots in your urine. · No urine or very little urine is flowing into the bag for 4 or more hours. Watch closely for changes in your health, and be sure to contact your doctor if: · The area around the catheter becomes irritated, swollen, red, or tender, or there is pus draining from it. · Urine is leaking from the place where the catheter enters your body. Follow-up care is a key part of your treatment and safety. Be sure to make and go to all appointments, and call your doctor if you are having problems. It's also a good idea to know your test results and keep a list of the medicines you take. Where can you learn more? Go to http://darlene-anh.info/. Enter C910 in the search box to learn more about \"Learning About Urinary Catheter Care to Prevent Infection. \" Current as of: April 13, 2017 Content Version: 11.3 © 3530-3769 [employer]. Care instructions adapted under license by [employer] (which disclaims liability or warranty for this information). If you have questions about a medical condition or this instruction, always ask your healthcare professional. [place] [address] any warranty or liability for your use of this information. Discharge Orders None Introducing [facility]! [facility] introduces Nuevora patient portal. Now you can access parts of your medical record, email your doctor's office, and request medication refills online. 1. In your internet browser, go to [email or web address] 2. Click on the First Time User? Click Here link in the Sign In box. You will see the New Member Sign Up page. 3. Enter your cashcloud Access Code exactly as it appears below. You will not need to use this code after youve completed the sign-up process. If you do not sign up before the expiration date, you must request a new code. · cashcloud Access Code: Rustam Eaton Expires: 11/9/2017  9:51 AM 
 
4. Enter the last four digits of your Social Security Number (xxxx) and Date of Birth (mm/dd/yyyy) as indicated and click Submit. You will be taken to the next sign-up page. 5. Create a cashcloud ID. This will be your cashcloud login ID and cannot be changed, so think of one that is secure and easy to remember. 6. Create a cashcloud password. You can change your password at any time. 7. Enter your Password Reset Question and Answer. This can be used at a later time if you forget your password. 8. Enter your e-mail address. You will receive e-mail notification when new information is available in [address] 9. Click Sign Up. You can now view and download portions of your medical record. 10. Click the Download Summary menu link to download a portable copy of your medical information. If you have questions, please visit the Frequently Asked Questions section of the cashcloud website. Remember, cashcloud is NOT to be used for urgent needs. For medical emergencies, dial 911. Now available from your iPhone and Android! General Information Please provide this summary of care documentation to your next provider. Patient Signature:  ____________________________________________________________ Date:  ____________________________________________________________  
  
Adrianne Lambing Provider Signature:  ____________________________________________________________ Date:  ____________________________________________________________

## 2017-08-31 NOTE — OP NOTES
[address] [facility] [address]   OP NOTE       Name:  Blanquita Joe   MR#:  [id number]   :  1940   Account #:  [de-identified]    Surgery Date:  2017   Date of Adm:  2017       PREOPERATIVE DIAGNOSES   1. Spinal stenosis. 2. Spondylolisthesis. 3. Severe disk degeneration. POSTOPERATIVE DIAGNOSES   1. Spinal stenosis. 2. Spondylolisthesis. 3. Severe disk degeneration. 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 PROCEDURE PERFORMED   1. Exploration of old fusion which was not fused. 2. Removal of hardware if old fusion which was loose. 3. Reinsertion of hardware if old fusion up to L3.   4. Nonsegmental instrumentation of fusion at L2-L3. 5. Posterolateral fusion L2-L5. 6. Revision decompression at L3-L4 with exploration of the interspace,   bilateral decompression with medial facetectomies or foraminotomies. 7. Partial laminectomy decompression at L2-L3.   8. Bone marrow aspirate, left hip.   9. Use of [employer] to place pedicle screws at L2.   10. Use of local autograft bone. COMPLICATIONS: None. SURGEON: Mary Tyler MD    ASSISTANT: Charles Negrete PA-C    ANESTHESIA:  gen    FINDINGS: Nonunion and significant stenosis from L2-L4 and   nonunion throughout the lumbar spine noticed as loose pedicle screws   and incomplete bony bridging. ESTIMATED BLOOD LOSS: 400 mL. SPECIMENS REMOVED: None. INDICATION FOR PROCEDURE: The patient has severe back pain   radiating down the legs and in the right hip. After discussing the risks   and benefits of surgery, he elected to proceed. He understood the   risks, including CSF leak, nerve damage, infection, perioperative   morbidity and mortality, continued pain, infection, blood loss, surgical   site infection, nerve damage. He elected to proceed with posterior   decompression and fusion.  We had him scheduled for an XLIF, this   had to be changed because the screws were in the disk space and the   disk space was not accessible from the lateral position at 2-3. It is   likely that we are vanessa that this occurred because all screws were   loose, which would have continued his back pain and probably    necessitate another surgery. DESCRIPTION OF PROCEDURE: The patient was laid supine on the   operating table, prepped and draped in normal fashion using alcohol   and DuraPrep. A skin incision was made midline from L2-L5. Soft   tissue dissected down to spinous processes and then out over the pars   interarticularis and then out over the transverse processes at L2 and   L3 and then the dissection was brought out laterally and there was not   a lot of bone graft or fusion at L3-L5. Because of this, screws probably   were loose. At first The [address] was used to put new   pedicle screws in at L2 for an L2-L3 nonsegmental instrumentation. The old screws were loose so they were removed and replaced with   bigger 7.5 mm screws. After replacement of all the hardware and then   nonsegmental instrumentation at L2-L3 at this time the decompression   was done from L2-L4, decompressing the old scar tissue off the dura   going into the bilateral lateral recesses, and then following the L3   pedicle around the nerve roots and out the foramina at L3-L4 and   exploring the interspace to make sure there was no other stenosis. After this, the L2-L3 level was then decompressed going up and medial   facetectomies or foraminotomies were done until a Jasso ball   could be placed out the foramina. At this time, the wounds were copiously irrigated with Betadine and   bacitracin solution. Bone marrow aspirate was taken from the left hip. This was mixed with allograft bone and local autograft bone and   Vesuvius. A large BMP and Mastergraft was also placed in the   posterolateral gutters and a combination of these biologics were   placed after decortication was done.  At this time, the fusion L3-L5 was   refused the new fusion at L2-L3 and new instrumentation was done at   L2-L3, a deep drain was placed after the wounds were irrigated with   Betadine and bacitracin solution. Meticulous hemostasis was   maintained. A gram of vancomycin was placed. The fascia was closed   with #1 Vicryl. The skin was closed with 2-0 Vicryl, 3-0 Vicryl and   Dermabond. There was no complication during the procedure.  I, Dr. Esperanza Salinas, did the procedure myself with the help of Bernadette Marques MD      TS / PAG   D:  08/31/2017   11:33   T:  08/31/2017   12:33   Job #:  [id number]

## 2017-08-31 NOTE — ANESTHESIA PREPROCEDURE EVALUATION
Anesthetic History   No history of anesthetic complications            Review of Systems / Medical History  Patient summary reviewed, nursing notes reviewed and pertinent labs reviewed    Pulmonary  Within defined limits                 Neuro/Psych   Within defined limits           Cardiovascular    Hypertension                   GI/Hepatic/Renal  Within defined limits              Endo/Other    Diabetes         Other Findings              Physical Exam    Airway  Mallampati: II  TM Distance: > 6 cm  Neck ROM: normal range of motion   Mouth opening: Normal     Cardiovascular  Regular rate and rhythm,  S1 and S2 normal,  no murmur, click, rub, or gallop             Dental  No notable dental hx       Pulmonary  Breath sounds clear to auscultation               Abdominal  GI exam deferred       Other Findings            Anesthetic Plan    ASA: 2  Anesthesia type: general          Induction: Intravenous  Anesthetic plan and risks discussed with: Patient

## 2017-08-31 NOTE — BRIEF OP NOTE
BRIEF OPERATIVE NOTE    Date of Procedure: 8/31/2017   Preoperative Diagnosis: SPONDYLOLISTHESIS  HERNIATION   LUMBAR STENOSIS   Postoperative Diagnosis: SPONDYLOLISTHESIS  HERNIATION   LUMBAR STENOSIS     Procedure(s):  ROBOTIC L2-L4 REVISION DECOMPRESSION, L2-L5 FUSION, ALLOGRAFT, BONE MORPHOGENIC PROTEIN, HIP ASPIRATE, REMOVAL OF HARDWARE  Surgeon(s) and Role: Jacqueline Morin MD - Primary         Assistant Staff:  Physician Assistant: Elmer Saleh [address] [place] [address]    Surgical Staff:  Cell Saver : Radha Mayen  Circ-1: Terri Bess RN  Circ-Relief: Geoffrey Jalloh; Fabian Pozo RN  Physician Assistant: Elmer Saleh [address] [place] [address] Talib  Radiology Technician: RT Eulalio, R  Scrub Tech-1: Landry Barnhart  Scrub RN-Relief: Manuel Elizabeth RN  Float Staff: Elisa Muhammad, RN; Berto Andersen RN  Event Time In   Incision Start 4326   Incision Close 1122     Anesthesia: General   Estimated Blood Loss: 400cc  Specimens: * No specimens in log *   Findings: stenosis, non union   Complications: none  Implants:   Implant Name Type Inv.  Item Serial No.  Lot No. LRB No. Used Action   [employer] 60 MM SEXTANT NABEEL Nabeel  NA  NA Bilateral 2 Explanted   6.5 X 45 MM [employer] SCRES Screw  NA  NA N/A 6 Explanted   7.5X45 SCREW   NA [employer] [place] NA N/A 4 Implanted   [id number] SCREW   NA [employer] [place] NA N/A 1 Implanted   [id number] SCREW   NA [employer] [place] NA N/A 1 Implanted   7.5X50 SCREW   NA [employer] [place] NA N/A 1 Implanted   7.5X35 SCREW   NA [employer] [place] NA N/A 1 Implanted   SET SCREW   NA [employer] [place] NA N/A 8 Implanted   GRAFT BNE RECOMB HUM INFUS LG --  - SNA  GRAFT BNE RECOMB HUM INFUS LG --  NA [employer] SOFAMOR DANEK [id number] N/A 1 Implanted   GRAFT BNE FILLER-EXTENDER 10ML -- MASTERGRAFT MATRIX EXT - SNA  GRAFT BNE FILLER-EXTENDER 10ML -- MASTERGRAFT MATRIX EXT NA [employer] SOFAMOR DANEK [id number] N/A 1 Implanted   GRAFT FIBER DEMINERALIZED 30ML -- VESUVIUS FIBERS - SNA  GRAFT FIBER DEMINERALIZED 30ML -- VESUVIUS FIBERS NA [facility] [id number] N/A 1 Implanted   REDUCTION HEAD   NA [employer] [place] NA N/A 8 Implanted   90MM RAMONA     NA [employer] [place] NA N/A 2 Implanted

## 2017-08-31 NOTE — PERIOP NOTES
TRANSFER - OUT REPORT:    Verbal report given to Dejon (name) rani Kinsey  being transferred to [phone] (unit) for routine post - op       Report consisted of patients Situation, Background, Assessment and   Recommendations(SBAR). Time Pre op antibiotic given:See MAR  Anesthesia Stop time: 4901  Pereyra Present on Transfer to floor:Yes  Order for Pereyra on Chart:Yes    Information from the following report(s) SBAR, OR Summary, Procedure Summary, Intake/Output and MAR was reviewed with the receiving nurse. Opportunity for questions and clarification was provided. Is the patient on 02? NO       L/Min RA       Other None    Is the patient on a monitor? NO    Is the nurse transporting with the patient? NO    Surgical Waiting Area notified of patient's transfer from PACU? YES      The following personal items collected during your admission accompanied patient upon transfer:   Dental Appliance:    Vision:    Hearing Aid:    Jewelry: Jewelry: None  Clothing: Clothing:  (clothing to PACU)  Other Valuables:  Other Valuables: None  Valuables sent to safe:

## 2017-09-01 LAB
ANION GAP SERPL CALC-SCNC: 11 MMOL/L (ref 5–15)
BUN SERPL-MCNC: 11 MG/DL (ref 6–20)
BUN/CREAT SERPL: 15 (ref 12–20)
CALCIUM SERPL-MCNC: 8.6 MG/DL (ref 8.5–10.1)
CHLORIDE SERPL-SCNC: 104 MMOL/L (ref 97–108)
CO2 SERPL-SCNC: 22 MMOL/L (ref 21–32)
CREAT SERPL-MCNC: 0.71 MG/DL (ref 0.7–1.3)
GLUCOSE BLD STRIP.AUTO-MCNC: 183 MG/DL (ref 65–100)
GLUCOSE BLD STRIP.AUTO-MCNC: 190 MG/DL (ref 65–100)
GLUCOSE BLD STRIP.AUTO-MCNC: 191 MG/DL (ref 65–100)
GLUCOSE BLD STRIP.AUTO-MCNC: 227 MG/DL (ref 65–100)
GLUCOSE SERPL-MCNC: 177 MG/DL (ref 65–100)
HGB BLD-MCNC: 9.2 G/DL (ref 12.1–17)
POTASSIUM SERPL-SCNC: 3.9 MMOL/L (ref 3.5–5.1)
SERVICE CMNT-IMP: ABNORMAL
SODIUM SERPL-SCNC: 137 MMOL/L (ref 136–145)

## 2017-09-01 PROCEDURE — 97530 THERAPEUTIC ACTIVITIES: CPT | Performed by: OCCUPATIONAL THERAPIST

## 2017-09-01 PROCEDURE — 77030032490 HC SLV COMPR SCD KNE COVD -B

## 2017-09-01 PROCEDURE — 65270000029 HC RM PRIVATE

## 2017-09-01 PROCEDURE — 97535 SELF CARE MNGMENT TRAINING: CPT | Performed by: OCCUPATIONAL THERAPIST

## 2017-09-01 PROCEDURE — 97165 OT EVAL LOW COMPLEX 30 MIN: CPT | Performed by: OCCUPATIONAL THERAPIST

## 2017-09-01 PROCEDURE — 74011250636 HC RX REV CODE- 250/636: Performed by: PHYSICIAN ASSISTANT

## 2017-09-01 PROCEDURE — 97161 PT EVAL LOW COMPLEX 20 MIN: CPT | Performed by: PHYSICAL THERAPIST

## 2017-09-01 PROCEDURE — 74011250637 HC RX REV CODE- 250/637: Performed by: PHYSICIAN ASSISTANT

## 2017-09-01 PROCEDURE — 80048 BASIC METABOLIC PNL TOTAL CA: CPT | Performed by: PHYSICIAN ASSISTANT

## 2017-09-01 PROCEDURE — 82962 GLUCOSE BLOOD TEST: CPT

## 2017-09-01 PROCEDURE — 36415 COLL VENOUS BLD VENIPUNCTURE: CPT | Performed by: PHYSICIAN ASSISTANT

## 2017-09-01 PROCEDURE — 85018 HEMOGLOBIN: CPT | Performed by: PHYSICIAN ASSISTANT

## 2017-09-01 PROCEDURE — 51798 US URINE CAPACITY MEASURE: CPT

## 2017-09-01 PROCEDURE — 97116 GAIT TRAINING THERAPY: CPT | Performed by: PHYSICAL THERAPIST

## 2017-09-01 PROCEDURE — 74011636637 HC RX REV CODE- 636/637: Performed by: NURSE PRACTITIONER

## 2017-09-01 RX ORDER — OXYCODONE HYDROCHLORIDE 5 MG/1
5 TABLET ORAL
Status: DISCONTINUED | OUTPATIENT
Start: 2017-09-01 | End: 2017-09-05 | Stop reason: HOSPADM

## 2017-09-01 RX ORDER — TRAMADOL HYDROCHLORIDE 50 MG/1
50 TABLET ORAL
Qty: 50 TAB | Refills: 0 | Status: SHIPPED | OUTPATIENT
Start: 2017-09-01

## 2017-09-01 RX ADMIN — SENNOSIDES AND DOCUSATE SODIUM 1 TABLET: 8.6; 5 TABLET ORAL at 08:51

## 2017-09-01 RX ADMIN — VITAMIN D, TAB 1000IU (100/BT) 1000 UNITS: 25 TAB at 08:51

## 2017-09-01 RX ADMIN — TRAMADOL HYDROCHLORIDE 50 MG: 50 TABLET, FILM COATED ORAL at 23:15

## 2017-09-01 RX ADMIN — LOVASTATIN 40 MG: 20 TABLET ORAL at 17:38

## 2017-09-01 RX ADMIN — INSULIN GLARGINE 10 UNITS: 100 INJECTION, SOLUTION SUBCUTANEOUS at 08:56

## 2017-09-01 RX ADMIN — INSULIN LISPRO 2 UNITS: 100 INJECTION, SOLUTION INTRAVENOUS; SUBCUTANEOUS at 06:54

## 2017-09-01 RX ADMIN — INSULIN LISPRO 2 UNITS: 100 INJECTION, SOLUTION INTRAVENOUS; SUBCUTANEOUS at 17:38

## 2017-09-01 RX ADMIN — SODIUM CHLORIDE 125 ML/HR: 900 INJECTION, SOLUTION INTRAVENOUS at 05:34

## 2017-09-01 RX ADMIN — TAMSULOSIN HYDROCHLORIDE 0.4 MG: 0.4 CAPSULE ORAL at 22:12

## 2017-09-01 RX ADMIN — INSULIN LISPRO 2 UNITS: 100 INJECTION, SOLUTION INTRAVENOUS; SUBCUTANEOUS at 12:06

## 2017-09-01 RX ADMIN — INSULIN LISPRO 2 UNITS: 100 INJECTION, SOLUTION INTRAVENOUS; SUBCUTANEOUS at 22:12

## 2017-09-01 RX ADMIN — Medication 10 ML: at 05:34

## 2017-09-01 RX ADMIN — POLYETHYLENE GLYCOL 3350 17 G: 17 POWDER, FOR SOLUTION ORAL at 08:51

## 2017-09-01 RX ADMIN — SENNOSIDES AND DOCUSATE SODIUM 1 TABLET: 8.6; 5 TABLET ORAL at 17:38

## 2017-09-01 RX ADMIN — Medication 10 ML: at 22:12

## 2017-09-01 RX ADMIN — PANTOPRAZOLE SODIUM 40 MG: 40 TABLET, DELAYED RELEASE ORAL at 06:55

## 2017-09-01 RX ADMIN — FOLIC ACID 1 MG: 1 TABLET ORAL at 08:51

## 2017-09-01 NOTE — PROGRESS NOTES
Problem: Mobility Impaired (Adult and Pediatric)  Goal: *Acute Goals and Plan of Care (Insert Text)  Physical Therapy Goals  Initiated 9/1/2017    1. Patient will move from supine to sit and sit to supine , scoot up and down and roll side to side in bed with independence within 4 days. 2. Patient will perform sit to stand with independence within 4 days. 3. Patient will ambulate with independence for 200 feet with the least restrictive device within 4 days. 4. Patient will verbalize and demonstrate understanding of spinal precautions (No bending, lifting greater than 5 lbs, or twisting; log-roll technique; frequent repositioning as instructed) within 4 days. PHYSICAL THERAPY TREATMENT  Patient: Sánchez Ramos (11 y.o. male)  Date: 9/1/2017  Diagnosis: SPONDYLOLISTHESIS HERNIATION  LUMBAR STENOSIS   Spinal stenosis of lumbar region <principal problem not specified>  Procedure(s) (LRB):  ROBOTIC L2-L4 REVISION DECOMPRESSION, L2-L3 FUSION, ALLOGRAFT, BONE MORPHOGENIC PROTEIN, HIP ASPIRATE, REMOVAL OF HARDWARE (N/A) 1 Day Post-Op  Precautions: Fall, Back      ASSESSMENT:  Better this afternoon. More awake. Patient had just gotten to the chair with nursing. Able to ambulate 200 feet with the RW. Slightly flexed posture and encouraged to stand upright. He has his shoes here and wore the slipper socks this afternoon but would probably be more stable with his shoes. He did wear the shoes this morning. Returned to the bedside chair. Wife present. Progression toward goals:  [X]      Improving appropriately and progressing toward goals  [ ]      Improving slowly and progressing toward goals  [ ]      Not making progress toward goals and plan of care will be adjusted       PLAN:  Patient continues to benefit from skilled intervention to address the above impairments. Continue treatment per established plan of care.   Discharge Recommendations:  Home Health  Further Equipment Recommendations for Discharge: None-has all equipment. SUBJECTIVE:   Patient stated I feel better this afternoon.    The patient stated 3/3 back precautions. Reviewed all 3 with patient. OBJECTIVE DATA SUMMARY:   Critical Behavior:  Neurologic State: Alert  Orientation Level: Oriented X4  Cognition: Follows commands  Safety/Judgement: Awareness of environment, Fall prevention, Insight into deficits  Functional Mobility Training:  Bed Mobility:  Log Rolling: Minimum assistance; Additional time;Assist x1 (for log roll)  Supine to Sit: Moderate assistance; Additional time;Assist x1 (A for upper body)     Scooting: Contact guard assistance; Additional time;Assist x1        Brace donned with  moderate assistance    Transfers:  Sit to Stand: Minimum assistance; Additional time;Assist x1 (using bed rail)  Stand to Sit: Minimum assistance        Bed to Chair: Minimum assistance; Additional time;Assist x1 (HHA with flexed posture)                    Balance:  Sitting: Intact  Standing: Impaired  Standing - Static: Fair  Standing - Dynamic : Fair  Ambulation/Gait Training:  Distance (ft): 200 Feet (ft)  Assistive Device: Walker, rolling;Gait belt;Orthotic device  Ambulation - Level of Assistance: Minimal assistance     Gait Description (WDL): Exceptions to WDL  Gait Abnormalities: Decreased step clearance        Base of Support: Narrowed     Speed/Penny: Pace decreased (<100 feet/min); Shuffled  Step Length: Left shortened;Right shortened                             Steady gait with the RW. Therapeutic Exercises:   Sitting ex 10 x bilat ankle pumps, LAQ, UE elevation. Inspirometer. Pain:  Pain Scale 1: Numeric (0 - 10)  Pain Intensity 1: 0              Activity Tolerance:   Good. Please refer to the flowsheet for vital signs taken during this treatment.   After treatment:   [X]  Patient left in no apparent distress sitting up in chair  [ ]  Patient left in no apparent distress in bed  [X]  Call bell left within reach  [X]  Nursing notified  [X]  Caregiver present  [ ]  Bed alarm activated      COMMUNICATION/COLLABORATION:   The patients plan of care was discussed with: Registered Nurse and Certified Nursing Assistant/Patient Care Technician     Brendan Schmitz, PT   Time Calculation: 15 mins

## 2017-09-01 NOTE — PROGRESS NOTES
Problem: Self Care Deficits Care Plan (Adult)  Goal: *Acute Goals and Plan of Care (Insert Text)  Occupational Therapy Goals  Initiated 9/1/2017    1. Patient will perform lower body dressing with supervision/set-up using adaptive equipment PRN within 7 days. 2. Patient will perform toileting and toilet transfer with supervision/set-up using most appropriate DME within 7 days. 3. Patient will don/doff back brace at supervision/set-up within 7 days. 4. Patient will verbalize/demonstrate 3/3 back precautions during ADL tasks without cues within 7 days. OCCUPATIONAL THERAPY EVALUATION  Patient: Stacey Anthony (14 y.o. male)  Date: 9/1/2017  Primary Diagnosis: SPONDYLOLISTHESIS HERNIATION  LUMBAR STENOSIS   Spinal stenosis of lumbar region  Procedure(s) (LRB):  ROBOTIC L2-L4 REVISION DECOMPRESSION, L2-L3 FUSION, ALLOGRAFT, BONE MORPHOGENIC PROTEIN, HIP ASPIRATE, REMOVAL OF HARDWARE (N/A) 1 Day Post-Op   Precautions: Fall, Back      ASSESSMENT :  Based on the objective data described below, the patient presents with decreased strength, endurance, mobility, balance in standing and safety following back sx. He currently requires up to max A for LE ADLs and toileting and mod A for functional mobility. He was drowsy during session and pt's wife states he keeps his eyes closed a lot at home. He has a supportive wife who will assist him at home. Recommend HH therapy at discharge. Pt and wife stated they are not interested in instruction on adaptive equipment as pt's wife will assist him with LE ADLs at home. Patient will benefit from skilled intervention to address the above impairments.   Patients rehabilitation potential is considered to be Guarded  Factors which may influence rehabilitation potential include:   [X]             None noted  [ ]             Mental ability/status  [ ]             Medical condition  [ ]             Home/family situation and support systems  [ ]             Safety awareness  [ ] Pain tolerance/management  [ ]             Other:        PLAN :  Recommendations and Planned Interventions:  [X]               Self Care Training                  [X]        Therapeutic Activities  [X]               Functional Mobility Training    [ ]        Cognitive Retraining  [X]               Therapeutic Exercises           [X]        Endurance Activities  [X]               Balance Training                   [ ]        Neuromuscular Re-Education  [ ]               Visual/Perceptual Training     [X]   Home Safety Training  [X]               Patient Education                 [X]        Family Training/Education  [ ]               Other (comment):     Frequency/Duration: Patient will be followed by occupational therapy 5 times a week to address goals. Discharge Recommendations: Home Health  Further Equipment Recommendations for Discharge: TBD       SUBJECTIVE:   Patient stated I feel ok.       OBJECTIVE DATA SUMMARY:   HISTORY:   Past Medical History:   Diagnosis Date    Cataract       left eye cataract surg    Chronic pain      copd       error    Diabetes ([facility] [address]      Hammer toe      HLD (hyperlipidemia) 4/22/2010    Hypertension      Other ill-defined conditions       CLULITIS BILATERAL  FEET    Other ill-defined conditions       BILATERAL GLAUCOMA     Past Surgical History:   Procedure Laterality Date    HX CATARACT REMOVAL Bilateral      HX GI         COLOONSCOPY    HX ORTHOPAEDIC   2012     LOWER BACK SURGERY    HX OTHER SURGICAL         CYSTS LT ARM FORHEAD AND RT UPPER QUADRANT    HX UVULOPALATOPHARYNGOPLASTY            Prior Level of Function/Home Situation: Sedentary.   Independent with ADLs and mobility and drives rarely  Home Situation  Home Environment: Private residence  # Steps to Enter: 0  One/Two Story Residence: One story  Living Alone: No  Support Systems: Spouse/Significant Other/Partner  Patient Expects to be Discharged to[de-identified] Private residence  Current DME Used/Available at Home: Yi Bicker, straight, Walker, Raised toilet seat, Walker, rollator, Shower chair  Tub or Shower Type: Tub/Shower combination  [X]  Right hand dominant             [ ]  Left hand dominant     EXAMINATION OF PERFORMANCE DEFICITS:  Cognitive/Behavioral Status:  Neurologic State: Drowsy  Orientation Level: Oriented to person;Oriented to place;Oriented to situation;Disoriented to time  Cognition: Decreased attention/concentration; Follows commands  Perception: Appears intact  Perseveration: No perseveration noted  Safety/Judgement: Awareness of environment; Fall prevention; Insight into deficits     Hearing: Auditory  Auditory Impairment: None     Vision/Perceptual:    Acuity: Within Defined Limits    Corrective Lenses: Glasses     Range of Motion:  AROM: Generally decreased, functional  PROM: Generally decreased, functional                       Strength:  Strength: Generally decreased, functional                 Coordination:  Coordination: Generally decreased, functional  Fine Motor Skills-Upper: Left Intact; Right Intact    Gross Motor Skills-Upper: Left Intact; Right Intact     Tone & Sensation:  Tone: Normal  Sensation: Intact                       Balance:  Sitting: Intact  Standing: Impaired  Standing - Static: Fair  Standing - Dynamic : Fair     Functional Mobility and Transfers for ADLs:  Bed Mobility:  Rolling: Minimum assistance; Additional time;Assist x1 (for log roll)  Supine to Sit: Moderate assistance; Additional time;Assist x1 (A for upper body)  Scooting: Contact guard assistance; Additional time;Assist x1     Transfers:  Sit to Stand: Minimum assistance; Additional time;Assist x1 (using bed rail)  Stand to Sit: Minimum assistance  Bed to Chair: Minimum assistance; Additional time;Assist x1 (HHA with flexed posture)  Toilet Transfer : Minimum assistance; Additional time;Assist x1 (to [facility])     ADL Assessment:  Feeding: Modified independent     Oral Facial Hygiene/Grooming: Setup; Additional time (seated )     Bathing: Moderate assistance; Additional time;Assist x1 (seated with Nic reach buttocks and feet)     Upper Body Dressing: Minimum assistance; Additional time;Assist x1 (A to manage LSO brace)     Lower Body Dressing: Maximum assistance; Additional time;Assist x1 (pt's wife will assist at home)     Toileting: Maximum assistance; Additional time;Assist x1 (TOMMY kemp for bowel hygiene and clothing)                 ADL Intervention and task modifications:  Bathing: Patient instructed and indicated understanding when bathing to not submerge wound in water, stand to shower or sponge bathe, cover wound with plastic and tape to ensure no water reaches bandage/wound without cues. Dressing brace: Patient instructed and demonstrated to don/doff velcro on brace using dominant side, keeping non-dominant side intact. Patient instructed and demonstrated in meantime of being able to stand with back against wall to don/doff brace, to don/doff seated using lap and bed/chair surface to support brace while manipulating. Dressing lower body: Patient instructed to don brace first and on the benefits to remain seated to don all clothing to increase independence with precautions and pain management. Toileting: Patient instructed on the benefits of using flushable wet wipes and toilet tongs if decreased reach or pain for mesha care. Also, the benefits of a reacher to aid in clothing management. Home safety: Patient instructed and indicated understanding on home modifications and safety (raise height of ADL objects, appropriate height of chair surfaces, recliner safety, change of floor surfaces, clear pathways) to increase independence and fall prevention. Standing: Patient instructed and indicated understanding to walk up to sink/counter top/surfaces, step into walker, square off while using objects, slide objects along surfaces, to increase adherence to back precautions and fall prevention.   Patient instructed to increase amount of time standing in order to increase independence and tolerance with ADLs. During prolonged standing, can open cabinet door or place foot on stool to decrease spinal pressure/increase pain. Tub transfer: Patient instructed and indicated understanding regarding when it is safe to begin transfer into tub (complete stairs with PT, advance exercises with PT high enough to clear tub height, and while clothes donned practice with another person present). Patient instructed and indicated understanding to use the same technique as used with stairs when entering and exiting tub (\"up with good leg, down with bad leg\"). Patient instructed and indicated understanding the benefits of maintaining activity tolerance, functional mobility, and independence with self care tasks during acute stay  to ensure safe return home and to baseline. Encouraged patient to increase frequency and duration OOB, not sitting longer than 30 mins without marching/walking with staff, be out of bed for all meals, perform daily ADLs (as approved by RN/MD regarding bathing etc), and performing functional mobility to/from bathroom. Patient instruction and indicated understanding on body mechanics, ergonomics and gravitational force on the spine during different body positions to plan activities in prep for return home to complete instrumental ADLs and back to work safely. Cognitive Retraining  Safety/Judgement: Awareness of environment; Fall prevention; Insight into deficits        Functional Measure:  Barthel Index:      Bathin  Bladder: 0  Bowels: 10  Groomin  Dressin  Feeding: 10  Mobility: 0  Stairs: 0  Toilet Use: 5  Transfer (Bed to Chair and Back): 5  Total: 40         Barthel and G-code impairment scale:  Percentage of impairment CH  0% CI  1-19% CJ  20-39% CK  40-59% CL  60-79% CM  80-99% CN  100%   Barthel Score 0-100 100 99-80 79-60 59-40 20-39 1-19    0   Barthel Score 0-20 20 17-19 13-16 9-12 5-8 1-4 0      The Barthel ADL Index: Guidelines  1. The index should be used as a record of what a patient does, not as a record of what a patient could do. 2. The main aim is to establish degree of independence from any help, physical or verbal, however minor and for whatever reason. 3. The need for supervision renders the patient not independent. 4. A patient's performance should be established using the best available evidence. Asking the patient, friends/relatives and nurses are the usual sources, but direct observation and common sense are also important. However direct testing is not needed. 5. Usually the patient's performance over the preceding 24-48 hours is important, but occasionally longer periods will be relevant. 6. Middle categories imply that the patient supplies over 50 per cent of the effort. 7. Use of aids to be independent is allowed. Faith Tay., Barthel, D.W. ([id number]). Functional evaluation: the Barthel Index. [address] [facility] (14)2. Marly Ojeda james ANJELICA Arguelles, Gaby Juarez., Becky Mar., [place], [address] [place] [address] Ave (1999). Measuring the change indisability after inpatient rehabilitation; comparison of the responsiveness of the Barthel Index and Functional [place] Measure. Journal of Neurology, Neurosurgery, and Psychiatry, 66(4), 354-612. Sobia Walker, N.J.A, FREEDOM Davis, & Leo Nicole MMIAN. (2004.) Assessment of post-stroke quality of life in cost-effectiveness studies: The usefulness of the Barthel Index and the EuroQoL-5D. Quality of Life Research, 13, 468-40         G codes: In compliance with CMSs Claims Based Outcome Reporting, the following G-code set was chosen for this patient based on their primary functional limitation being treated: The outcome measure chosen to determine the severity of the functional limitation was the Barthel Index with a score of 40/100 which was correlated with the impairment scale.       · Self Care:               - CURRENT STATUS:    CL - 60%-79% impaired, limited or restricted               - GOAL STATUS:           CK - 40%-59% impaired, limited or restricted               - D/C STATUS:                       ---------------To be determined---------------      Occupational Therapy Evaluation Charge Determination   History Examination Decision-Making   LOW Complexity : Brief history review  MEDIUM Complexity : 3-5 performance deficits relating to physical, cognitive , or psychosocial skils that result in activity limitations and / or participation restrictions LOW Complexity : No comorbidities that affect functional and no verbal or physical assistance needed to complete eval tasks       Based on the above components, the patient evaluation is determined to be of the following complexity level: LOW   Activity Tolerance:   Fair  Please refer to the flowsheet for vital signs taken during this treatment. After treatment:   [X] Patient left in no apparent distress sitting up in chair  [ ] Patient left in no apparent distress in bed  [X] Call bell left within reach  [X] Nursing notified  [X] Caregiver present - pt's wife  [ ] Bed alarm activated      COMMUNICATION/EDUCATION:   The patients plan of care was discussed with: Physical Therapist and Registered Nurse.  [X] Home safety education was provided and the patient/caregiver indicated understanding. [X] Patient/family have participated as able in goal setting and plan of care. [X] Patient/family agree to work toward stated goals and plan of care. [ ] Patient understands intent and goals of therapy, but is neutral about his/her participation. [ ] Patient is unable to participate in goal setting and plan of care. This patients plan of care is appropriate for delegation to [facility].      Thank you for this referral.  Delfina Adler, OT  Time Calculation: 42 mins

## 2017-09-01 NOTE — PROGRESS NOTES
Orthopedic Spine Progress Note  Post Op day: 1 Day Post-Op    2017 7:54 AM     Leslee Cristobal    Vital Signs:    Patient Vitals for the past 8 hrs:   BP Temp Pulse Resp SpO2   17 0403 113/65 99.7 °F (37.6 °C) 80 16 100 %     Temp (24hrs), Av.2 °F (36.8 °C), Min:97.4 °F (36.3 °C), Max:99.7 °F (37.6 °C)      Intake/Output:     1901 -  0700  In: 2125 [I.V.:2000]  Out: 4363 [Urine:2675; Drains:410]    Pain Control:   Pain Assessment  Pain Scale 1: Numeric (0 - 10)  Pain Intensity 1: 0  Pain Location 1: Spine, lumbar  Pain Description 1: Constant, Dull    LAB:    Recent Labs      17   0327   HGB  9.2*     Lab Results   Component Value Date/Time    Sodium 137 2017 03:27 AM    Potassium 3.9 2017 03:27 AM    Chloride 104 2017 03:27 AM    CO2 22 2017 03:27 AM    Glucose 177 2017 03:27 AM    BUN 11 2017 03:27 AM    Creatinine 0.71 2017 03:27 AM    Calcium 8.6 2017 03:27 AM       Subjective:  Leslee Cristobal is a 78 y.o. male s/p a  Procedure(s):  ROBOTIC L2-L4 REVISION DECOMPRESSION, L2-L3 FUSION, ALLOGRAFT, BONE MORPHOGENIC PROTEIN, HIP ASPIRATE, REMOVAL OF HARDWARE   Procedure(s):  ROBOTIC L2-L4 REVISION DECOMPRESSION, L2-L3 FUSION, ALLOGRAFT, BONE MORPHOGENIC PROTEIN, HIP ASPIRATE, REMOVAL OF HARDWARE. Tolerating diet. Objective: General: alert, cooperative, no distress. Gastrointestinal:  Soft, non-tender. Neurological: Neurovascular exam within normal limits. Sensation stable. Motor: unchanged C5-T1 and L2-S1. Musculoskeletal:  Dalton's sign negative in bilateral lower extremities. Calves soft, supple, non-tender upon palpation or with passive stretch. Skin: Incision - clean, dry and intact. No significant erythema or swelling.     Dressing: clean, dry, and intact     PT/OT:   Gait:                      Assessment:    s/p Procedure(s):  ROBOTIC L2-L4 REVISION DECOMPRESSION, L2-L3 FUSION, ALLOGRAFT, BONE MORPHOGENIC PROTEIN, HIP ASPIRATE, REMOVAL OF HARDWARE    Active Problems:    Spinal stenosis of lumbar region (8/31/2017)         Plan:     1. Continue PT/OT  2. Continue established methods of pain control  3. VTE Prophylaxes - TEDS &/or SCDs              Discharge To:   Home vs rehab    Signed By: Jared Deleon MD

## 2017-09-01 NOTE — PROGRESS NOTES
Problem: Falls - Risk of  Goal: *Absence of Falls  Document Nancy Fall Risk and appropriate interventions in the flowsheet.    Outcome: Progressing Towards Goal  Fall Risk Interventions:  Mobility Interventions: OT consult for ADLs, PT Consult for mobility concerns, Utilize gait belt for transfers/ambulation, Utilize walker, cane, or other assitive device           Medication Interventions: Assess postural VS orthostatic hypotension     Elimination Interventions: Call light in reach, Patient to call for help with toileting needs

## 2017-09-01 NOTE — CDMP QUERY
Dear Dr. Jose Eduardo Ramey,    Please clarify if this patient is being treated/managed for:    =>Type 2 diabetes with hyperglycemia in the setting of DM II requiring lab monitoring and insulin  =>Other Explanation of clinical findings  =>Unable to Determine (no explanation of clinical findings)    The medical record reflects the following clinical findings, treatment, and risk factors:    Risk Factors: hx DM2  Clinical Indicators: per PN \"fbg between 157 and 261 in last 24 hours\"  Treatment: POC/lab monitoring, Novolog/Lantus, diabetic diet    Please clarify and document your clinical opinion in the progress notes and discharge summary including the definitive and/or presumptive diagnosis, (suspected or probable), related to the above clinical findings. Please include clinical findings supporting your diagnosis.     Thank You  Ethel Parisi,MSN,BSN,RN,[facility]  [phone]

## 2017-09-01 NOTE — PROGRESS NOTES
Problem: Mobility Impaired (Adult and Pediatric)  Goal: *Acute Goals and Plan of Care (Insert Text)  Physical Therapy Goals  Initiated 9/1/2017    1. Patient will move from supine to sit and sit to supine , scoot up and down and roll side to side in bed with independence within 4 days. 2. Patient will perform sit to stand with independence within 4 days. 3. Patient will ambulate with independence for 200 feet with the least restrictive device within 4 days. 4. Patient will verbalize and demonstrate understanding of spinal precautions (No bending, lifting greater than 5 lbs, or twisting; log-roll technique; frequent repositioning as instructed) within 4 days. PHYSICAL THERAPY EVALUATION  Patient: [facility] (67 y.o. male)  Date: 9/1/2017  Primary Diagnosis: SPONDYLOLISTHESIS HERNIATION  LUMBAR STENOSIS   Spinal stenosis of lumbar region  Procedure(s) (LRB):  ROBOTIC L2-L4 REVISION DECOMPRESSION, L2-L3 FUSION, ALLOGRAFT, BONE MORPHOGENIC PROTEIN, HIP ASPIRATE, REMOVAL OF HARDWARE (N/A) 1 Day Post-Op   Precautions:   Fall, Back      ASSESSMENT :  Based on the objective data described below, the patient presents with impaired mobility and gait following a lumbar revision decompression. Patient was up in the chair on arrival.  Stood with a RW and ambulated 75 feet fairly quickly but pushing the RW out in front of him with a flexed posture. Encouraged to slow down and stand up straight. He was half asleep and veered into the wall and doorway so needed assist with navigation. Returned to bed. His wife states he was up most of the night and asked for breakfast at 3am.  He should be better once he gets a good nap. Patient will benefit from skilled intervention to address the above impairments.   Patients rehabilitation potential is considered to be Good  Factors which may influence rehabilitation potential include:   [X]         None noted  [ ]         Mental ability/status  [ ]         Medical condition  [ ]         Home/family situation and support systems  [ ]         Safety awareness  [ ]         Pain tolerance/management  [ ]         Other:        PLAN :  Recommendations and Planned Interventions:  [X]           Bed Mobility Training             [ ]    Neuromuscular Re-Education  [X]           Transfer Training                   [ ]    Orthotic/Prosthetic Training  [X]           Gait Training                         [ ]    Modalities  [X]           Therapeutic Exercises           [ ]    Edema Management/Control  [ ]           Therapeutic Activities            [X]    Patient and Family Training/Education  [ ]           Other (comment):     Frequency/Duration: Patient will be followed by physical therapy  twice daily to address goals. Discharge Recommendations: Home Health  Further Equipment Recommendations for Discharge: none       SUBJECTIVE:   Patient stated I am ready to walk.       OBJECTIVE DATA SUMMARY:   HISTORY:    Past Medical History:   Diagnosis Date    Cataract       left eye cataract surg    Chronic pain      copd       error    Diabetes ([address]      Hammer toe      HLD (hyperlipidemia) 4/22/2010    Hypertension      Other ill-defined conditions       CLULITIS BILATERAL  FEET    Other ill-defined conditions       BILATERAL GLAUCOMA     Past Surgical History:   Procedure Laterality Date    HX CATARACT REMOVAL Bilateral      HX GI         COLOONSCOPY    HX ORTHOPAEDIC   2012     LOWER BACK SURGERY    HX OTHER SURGICAL         CYSTS LT ARM FORHEAD AND RT UPPER QUADRANT    HX UVULOPALATOPHARYNGOPLASTY         Prior Level of Function/Home Situation: Independent ambulation with no AD  Personal factors and/or comorbidities impacting plan of care:      Home Situation  Home Environment: Private residence  # Steps to Enter: 0  One/Two Story Residence: One story  Living Alone: No  Support Systems: Spouse/Significant Other/Partner  Patient Expects to be Discharged to[employer] residence  Current DME Used/Available at Home: Aldona Carmen, straight, Walker, Raised toilet seat, Kristine Lame, rollator, Shower chair  Tub or Shower Type: Tub/Shower combination     EXAMINATION/PRESENTATION/DECISION MAKING:   Critical Behavior:  Neurologic State: Drowsy  Orientation Level: Oriented to person, Oriented to place, Oriented to situation, Disoriented to time  Cognition: Decreased attention/concentration, Follows commands  Safety/Judgement: Awareness of environment, Fall prevention, Insight into deficits  Hearing: Auditory  Auditory Impairment: None  Skin:  Per nursing. Edema: per nursing. Range Of Motion:  AROM: Generally decreased, functional           PROM: Generally decreased, functional           Strength:    Strength: Generally decreased, functional                    Tone & Sensation:   Tone: Normal              Sensation: Intact               Coordination:  Coordination: Generally decreased, functional  Vision:   Acuity: Within Defined Limits  Corrective Lenses: Glasses  Functional Mobility:  Bed Mobility:  Rolling: Minimum assistance; Additional time;Assist x1 (for log roll)  Supine to Sit: Moderate assistance; Additional time;Assist x1 (A for upper body)     Scooting: Contact guard assistance; Additional time;Assist x1  Transfers:  Sit to Stand: Minimum assistance; Additional time;Assist x1 (using bed rail)  Stand to Sit: Minimum assistance        Bed to Chair: Minimum assistance; Additional time;Assist x1 (HHA with flexed posture)              Balance:   Sitting: Intact  Standing: Impaired  Standing - Static: Fair  Standing - Dynamic : Fair  Ambulation/Gait Training:  Distance (ft): 75 Feet (ft)  Assistive Device: Walker, rolling;Gait belt;Orthotic device  Ambulation - Level of Assistance: Minimal assistance     Gait Description (WDL): Exceptions to WDL  Gait Abnormalities: Decreased step clearance        Base of Support: Narrowed     Speed/Penny: Pace decreased (<100 feet/min); Shuffled  Step Length: Left shortened;Right shortened                             Fairly rapid gait pushing the RW out in front of himself. Stairs:                             Functional Measure:  Barthel Index:      Bathin  Bladder: 0  Bowels: 10  Groomin  Dressin  Feeding: 10  Mobility: 0  Stairs: 0  Toilet Use: 5  Transfer (Bed to Chair and Back): 5  Total: 40         Barthel and G-code impairment scale:  Percentage of impairment CH  0% CI  1-19% CJ  20-39% CK  40-59% CL  60-79% CM  80-99% CN  100%   Barthel Score 0-100 100 99-80 79-60 59-40 20-39 1-19    0   Barthel Score 0-20 20 17-19 13-16 9-12 5-8 1-4 0      The Barthel ADL Index: Guidelines  1. The index should be used as a record of what a patient does, not as a record of what a patient could do. 2. The main aim is to establish degree of independence from any help, physical or verbal, however minor and for whatever reason. 3. The need for supervision renders the patient not independent. 4. A patient's performance should be established using the best available evidence. Asking the patient, friends/relatives and nurses are the usual sources, but direct observation and common sense are also important. However direct testing is not needed. 5. Usually the patient's performance over the preceding 24-48 hours is important, but occasionally longer periods will be relevant. 6. Middle categories imply that the patient supplies over 50 per cent of the effort. 7. Use of aids to be independent is allowed. Charo Mercado., Barthel, D.W. ([id number]). Functional evaluation: the Barthel Index. [facility] (14)2. Roberto Angers james ANJELICA Arguelles, Mike Plaza., [facility]., [facility], [facility] (). Measuring the change indisability after inpatient rehabilitation; comparison of the responsiveness of the Barthel Index and Functional [place]anan Measure. Journal of Neurology, Neurosurgery, and Psychiatry, 66(4), 700-607.   DAJUAN Douglas, FREEDOM Jose, & Elisha Peralta M.A. (2004.) Assessment of post-stroke quality of life in cost-effectiveness studies: The usefulness of the Barthel Index and the EuroQoL-5D. Quality of Life Research, 13, 103-17         G codes: In compliance with CMSs Claims Based Outcome Reporting, the following G-code set was chosen for this patient based on their primary functional limitation being treated: The outcome measure chosen to determine the severity of the functional limitation was the Barthel with a score of 40/100 which was correlated with the impairment scale. · Mobility - Walking and Moving Around:               - CURRENT STATUS:    CK - 40%-59% impaired, limited or restricted               - GOAL STATUS:           CJ - 20%-39% impaired, limited or restricted               - D/C STATUS:                       ---------------To be determined---------------      Pain:                    Activity Tolerance:   Good. Please refer to the flowsheet for vital signs taken during this treatment. After treatment:   [ ]         Patient left in no apparent distress sitting up in chair  [X]         Patient left in no apparent distress in bed  [X]         Call bell left within reach  [X]         Nursing notified  [X]         Caregiver present  [ ]         Bed alarm activated      COMMUNICATION/EDUCATION:   The patients plan of care was discussed with: Occupational Therapist, Registered Nurse and Rehabilitation Attendant. [X]         Fall prevention education was provided and the patient/caregiver indicated understanding. [X]         Patient/family have participated as able in goal setting and plan of care. [X]         Patient/family agree to work toward stated goals and plan of care. [ ]         Patient understands intent and goals of therapy, but is neutral about his/her participation. [ ]         Patient is unable to participate in goal setting and plan of care.      Thank you for this referral.  Lindsey Pitt Deny Whitten, PT   Time Calculation: 19 mins

## 2017-09-01 NOTE — PROGRESS NOTES
Orthopedic Spine Progress Note  Silver Brock, AGACNP-BC  Work Cell: [phone]    Post Op Day: 1 Day Post-Op    September 1, 2017 1:52 PM     Jakub San    HPI:    Jakub San is a 78 y.o. male with prior medical history significant for anxiety, hyperlipidemia, hypertension, diabetes mellitus type II, glaucoma, and chronic back pain. He underwent a prior L3-5 lumbar fusion with Dr. Rubén White years ago. He presented to Dr. Kris Cormier office with complaints of significant  back and right hip pain. 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 After a failing conservative treatment and following a discussion of the risks and benefits, Mr. Haleigh Abbott consented to undergo a  Procedure(s):  ROBOTIC L2-L4 REVISION DECOMPRESSION, L2-L3 FUSION, ALLOGRAFT, BONE MORPHOGENIC PROTEIN, HIP ASPIRATE, REMOVAL OF HARDWARE    Subjective:  Mr. Haleigh Abbott is drowsy this am. Tolerating diet without n/v. No leg pain, numbness, or tingling. PCA discontinued. Pereyra removed this afternoon. Continue to mobilize, brace when OOB. Limit opioids due to sedation. Objective:   General: alert, cooperative, no distress. EENT: EOMI. Anicteric sclerae. Oral mucous moist, oropharynx benign  Resp: CTA bilaterally. No wheezing/rhonchi/rales. No accessory muscle use  CV: Regular rhythm, normal rate, no murmurs, gallops, rubs. No cyanosis or clubbing. No edema appreciated in the extremities. Gastrointestinal:  Soft, non-tender. normoactive bowel sounds, no hepatosplenomegaly  Neurological: Follows commands. Speech clear. Affect normal.  GIVENS. Strength 5/5 in BUE and BLE. Sensation stable. Musculoskeletal:  Calves soft, supple, non-tender upon palpation or with passive stretch. Psych: Good insight. Not anxious nor agitated. Skin: Incision - clean, dry and intact. No significant surrounding erythema or swelling.     Dressing: clean, dry, and intact       Vital Signs: No data found. Temp (24hrs), Av.6 °F (37 °C), Min:97.8 °F (36.6 °C), Max:99.7 °F (37.6 °C)      Intake/Output:  701 -  190  In: -   Out: 038 [Urine:875]  1901 -  0700  In: 2125 [I.V.:2000]  Out: 5457 [Urine:2675; Drains:410]    Pain Control:   Pain Assessment  Pain Scale 1: Numeric (0 - 10)  Pain Intensity 1: 0  Pain Location 1: Spine, lumbar  Pain Description 1: Constant, Dull    LAB:    Recent Labs      17   0327   HGB  9.2*     Lab Results   Component Value Date/Time    Sodium 137 2017 03:27 AM    Potassium 3.9 2017 03:27 AM    Chloride 104 2017 03:27 AM    CO2 22 2017 03:27 AM    Glucose 177 2017 03:27 AM    BUN 11 2017 03:27 AM    Creatinine 0.71 2017 03:27 AM    Calcium 8.6 2017 03:27 AM       PT/OT:   Gait:  Gait  Base of Support: Narrowed  Speed/Penny: Pace decreased (<100 feet/min), Shuffled  Step Length: Left shortened, Right shortened  Gait Abnormalities: Decreased step clearance  Ambulation - Level of Assistance: Minimal assistance  Distance (ft): 75 Feet (ft)  Assistive Device: Walker, rolling, Gait belt, Orthotic device                 Assessment/Plan:    1. 1 Day Post-Op Procedure(s):  ROBOTIC L2-L4 REVISION DECOMPRESSION, L2-L3 FUSION, ALLOGRAFT, BONE MORPHOGENIC PROTEIN, HIP ASPIRATE, REMOVAL OF HARDWARE   -Continue PT/OT   -Pain managed with PRN oxycodone. Hold for somnolence   -Remove kemp once mobilized   -Cont to monitor hemovac drain   -Incentive Spirometer   -Tolerating diet. -VTE Prophylaxes - TEDS &/or SCDs    2. Diabetes Mellitus, type II   -Hgb A1c is 9, improved from 11 in April   -fbg between 157 and 261 in last 24 hours   -Home Lantus 10 units started yesterday. -Metformin held postoperatively. -Diabetic diet, accuchecks, SSI    3. Essential hypertension   -Well controlled on Cozaar    4.   HLD   -Cont Mevacor           Discharge To:  Pending progress with PT/OT    Signed By: Willard Rasmussen Edmund Drummond, NP

## 2017-09-01 NOTE — DIABETES MGMT
[facility] Ortho Education Note    Recommendations/ Comments: Once patient's diet is advance, please consider adding Humalog pre-meal insulin 2 units tid ac in addition to correction scale. Patient takes 3 units tid ac plus scale at home. Patient somewhat groggy, but wife able to answer questions. Patient is followed by Dr. Butch Kirk (an endocrinologist) and received education through her office. She has recently made adjustments to his insulin and his blood sugars have improved per wife (A1C is from 8/11/17). He checks his blood sugar tid and has a scale to use along with pre-meal Humalog. His blood sugars are mostly 100 -150 mg/dL, but sometimes go up to 300 mg/dL. Patient had previously had low blood sugars over night, so Lantus dose was adjusted down. Discussed bedtime snacks in case this becomes a problem in the future. Patient has also been avoiding most carbohydrates the last few weeks, so discussed importance of a balanced diet and reviewed plate method for meal planning in addition to a sample meal.    Chart reviewed and initial evaluation complete on Darline Jha. Patient is a 78 y.o. male with a history of diabetes on intensive insulin injection program (Lantus 10 units daily, Humalog 3 units tid ac) and Metformin at home. A1c:   Lab Results   Component Value Date/Time    Hemoglobin A1c 9.0 08/11/2017 12:04 PM    Hemoglobin A1c, External 9.4 12/04/2015 11:36 AM       Assessed and instructed patient on the following:   · risk of wound infection/ delayed healing   · interpretation of lab results, blood sugar goals, complications of diabetes mellitus, hypoglycemia prevention and treatment, insulin adjustments, illness management and nutrition.       Provided patient with the following: [x]            Survival skills education materials               [x]            BG guidelines for post-op patients                  [x]            Outpatient D[facility] contact number                   Recent Glucose Results:   Lab Results   Component Value Date/Time     (H) 09/01/2017 03:27 AM    GLUCPOC 183 (H) 09/01/2017 11:16 AM    GLUCPOC 190 (H) 09/01/2017 06:48 AM    GLUCPOC 261 (H) 08/31/2017 11:15 PM        Lab Results   Component Value Date/Time    Creatinine 0.71 09/01/2017 03:27 AM     Estimated Creatinine Clearance: 78.6 mL/min (based on Cr of 0.71). Active Orders   Diet    DIET CLEAR LIQUID No Conc. Sweets        PO intake: No data found. Current hospital DM medication: Lantus 10 units, Humalog for correction, normal sensitivity    Will continue to follow as needed. Thank you.    Marguerite Mckinley, MS, RN, CDE

## 2017-09-01 NOTE — PROGRESS NOTES
Bedside shift change report given to Jey Orona and [employer] (oncoming nurse) by Shirley Gerber (offgoing nurse). Report included the following information SBAR, Kardex, Intake/Output and MAR.

## 2017-09-01 NOTE — PROGRESS NOTES
Care Management Interventions  PCP Verified by CM: Yes  Transition of Care Consult (CM Consult): [address]: No  Reason Outside [place]:  (patient chose another company)  [address]: No  Discharge Durable Medical Equipment: No  Physical Therapy Consult: Yes  Occupational Therapy Consult: Yes  Speech Therapy Consult: No  Current Support Network: Lives with Spouse  Confirm Follow Up Transport: Family  Plan discussed with Pt/Family/Caregiver: Yes  Freedom of Choice Offered: Yes  Discharge Location  Discharge Placement: Home with home health    Chart reviewed for transitions of care, discussed patient during rounds. Patient was admitted for an L2-4 revision, L2-3 fusion. Therapy has worked with patient and are recommending home health. Cm met with patient and his wife at the bedside to explain role and offer support. We discussed home health and they would like to use a company that is near their [place] [facility] Referral sent to [facility]; waiting for response. 4:00 pm: [facility] responded that they can accept patient.   [employer] , [place]

## 2017-09-02 LAB
GLUCOSE BLD STRIP.AUTO-MCNC: 176 MG/DL (ref 65–100)
GLUCOSE BLD STRIP.AUTO-MCNC: 281 MG/DL (ref 65–100)
GLUCOSE BLD STRIP.AUTO-MCNC: 283 MG/DL (ref 65–100)
GLUCOSE BLD STRIP.AUTO-MCNC: 289 MG/DL (ref 65–100)
HGB BLD-MCNC: 8.8 G/DL (ref 12.1–17)
SERVICE CMNT-IMP: ABNORMAL

## 2017-09-02 PROCEDURE — 51798 US URINE CAPACITY MEASURE: CPT

## 2017-09-02 PROCEDURE — 85018 HEMOGLOBIN: CPT | Performed by: PHYSICIAN ASSISTANT

## 2017-09-02 PROCEDURE — 74011250637 HC RX REV CODE- 250/637: Performed by: NURSE PRACTITIONER

## 2017-09-02 PROCEDURE — 97116 GAIT TRAINING THERAPY: CPT | Performed by: PHYSICAL THERAPIST

## 2017-09-02 PROCEDURE — 65270000029 HC RM PRIVATE

## 2017-09-02 PROCEDURE — 36415 COLL VENOUS BLD VENIPUNCTURE: CPT | Performed by: PHYSICIAN ASSISTANT

## 2017-09-02 PROCEDURE — 74011636637 HC RX REV CODE- 636/637: Performed by: NURSE PRACTITIONER

## 2017-09-02 PROCEDURE — 82962 GLUCOSE BLOOD TEST: CPT

## 2017-09-02 PROCEDURE — 74011250637 HC RX REV CODE- 250/637: Performed by: PHYSICIAN ASSISTANT

## 2017-09-02 RX ADMIN — INSULIN LISPRO 5 UNITS: 100 INJECTION, SOLUTION INTRAVENOUS; SUBCUTANEOUS at 12:22

## 2017-09-02 RX ADMIN — OXYCODONE HYDROCHLORIDE 5 MG: 5 TABLET ORAL at 04:14

## 2017-09-02 RX ADMIN — VITAMIN D, TAB 1000IU (100/BT) 1000 UNITS: 25 TAB at 09:53

## 2017-09-02 RX ADMIN — PANTOPRAZOLE SODIUM 40 MG: 40 TABLET, DELAYED RELEASE ORAL at 07:14

## 2017-09-02 RX ADMIN — INSULIN GLARGINE 10 UNITS: 100 INJECTION, SOLUTION SUBCUTANEOUS at 09:53

## 2017-09-02 RX ADMIN — Medication 10 ML: at 22:47

## 2017-09-02 RX ADMIN — LOSARTAN POTASSIUM 50 MG: 50 TABLET ORAL at 09:53

## 2017-09-02 RX ADMIN — TAMSULOSIN HYDROCHLORIDE 0.4 MG: 0.4 CAPSULE ORAL at 22:47

## 2017-09-02 RX ADMIN — FOLIC ACID 1 MG: 1 TABLET ORAL at 09:53

## 2017-09-02 RX ADMIN — SENNOSIDES AND DOCUSATE SODIUM 1 TABLET: 8.6; 5 TABLET ORAL at 09:53

## 2017-09-02 RX ADMIN — SENNOSIDES AND DOCUSATE SODIUM 1 TABLET: 8.6; 5 TABLET ORAL at 17:23

## 2017-09-02 RX ADMIN — POLYETHYLENE GLYCOL 3350 17 G: 17 POWDER, FOR SOLUTION ORAL at 09:54

## 2017-09-02 RX ADMIN — LOVASTATIN 40 MG: 20 TABLET ORAL at 17:23

## 2017-09-02 RX ADMIN — INSULIN LISPRO 5 UNITS: 100 INJECTION, SOLUTION INTRAVENOUS; SUBCUTANEOUS at 17:23

## 2017-09-02 RX ADMIN — Medication 10 ML: at 07:11

## 2017-09-02 RX ADMIN — INSULIN LISPRO 3 UNITS: 100 INJECTION, SOLUTION INTRAVENOUS; SUBCUTANEOUS at 22:46

## 2017-09-02 RX ADMIN — INSULIN LISPRO 2 UNITS: 100 INJECTION, SOLUTION INTRAVENOUS; SUBCUTANEOUS at 07:10

## 2017-09-02 NOTE — PROGRESS NOTES
Ortho Daily Progress Note    9/2/2017  11:53 AM    POD:  2 Days Post-Op  S/P:  Procedure(s):  ROBOTIC L2-L4 REVISION DECOMPRESSION, L2-L3 FUSION, ALLOGRAFT, BONE MORPHOGENIC PROTEIN, HIP ASPIRATE, REMOVAL OF HARDWARE    Afebrile/VSS  Doing well without complaints of nausea  Pain well controlled  Calves soft/NTTP Bilaterally  Incision OK, no drainage or dehiscence. Dressing clean and dry. Hemovac in place, will dc this afternoon  Moving lower extremities well. Neurocirculatory exam intact and within normal range.     Lab Results   Component Value Date/Time    HGB 8.8 09/02/2017 04:28 AM    INR 1.1 08/11/2017 12:04 PM         PLAN:  DVT prophylaxis: SCD's  WBAT with PT-mobilization  Pain Control  Plan to D/C home probably tomorrow      SAHIL Mckeon

## 2017-09-02 NOTE — PROGRESS NOTES
Bedside shift change report given to [facility] FAUSTINO (oncoming nurse) by Ann Coyne RN (offgoing nurse). Report included the following information SBAR.

## 2017-09-02 NOTE — PROGRESS NOTES
Problem: Mobility Impaired (Adult and Pediatric)  Goal: *Acute Goals and Plan of Care (Insert Text)  Physical Therapy Goals  Initiated 9/1/2017    1. Patient will move from supine to sit and sit to supine , scoot up and down and roll side to side in bed with independence within 4 days. 2. Patient will perform sit to stand with independence within 4 days. 3. Patient will ambulate with independence for 200 feet with the least restrictive device within 4 days. 4. Patient will verbalize and demonstrate understanding of spinal precautions (No bending, lifting greater than 5 lbs, or twisting; log-roll technique; frequent repositioning as instructed) within 4 days. PHYSICAL THERAPY TREATMENT  Patient: Army Montalvo (89 y.o. male)  Date: 9/2/2017  Diagnosis: SPONDYLOLISTHESIS HERNIATION  LUMBAR STENOSIS   Spinal stenosis of lumbar region <principal problem not specified>  Procedure(s) (LRB):  ROBOTIC L2-L4 REVISION DECOMPRESSION, L2-L3 FUSION, ALLOGRAFT, BONE MORPHOGENIC PROTEIN, HIP ASPIRATE, REMOVAL OF HARDWARE (N/A) 2 Days Post-Op  Precautions: Fall, Back      ASSESSMENT:  Patient making steady progress toward goals. Wife present during session and reports some continued confusion with patient but no significant confusion noted during session. Patient currently needing Aster for bed mobility and modA for supine to sit. Sit to stand with Aster and good initial standing balance at RW. Amb approx 150 feet with RW and CGA-Aster (mainly for walker management). Patient has shuffling gait and tend to  flexed posture. Patient left up in chair with needs in reach and wife present. Recommend HH PT with 24 hr supervision vs rehab setting. Anticipate HH PT with 24 hr assist/supervision will be adequate.   Progression toward goals:  [ ]      Improving appropriately and progressing toward goals  [X]      Improving slowly and progressing toward goals  [ ]      Not making progress toward goals and plan of care will be adjusted       PLAN:  Patient continues to benefit from skilled intervention to address the above impairments. Continue treatment per established plan of care. Discharge Recommendations:  Home Health with 24 hr supervision/assist  Further Equipment Recommendations for Discharge: Owns RW       SUBJECTIVE:   Patient stated I know I shouldn't sit too long.    The patient stated 2/3 back precautions. Reviewed all 3 with patient. OBJECTIVE DATA SUMMARY:   Critical Behavior:  Neurologic State: Alert  Orientation Level: Oriented to person, Disoriented to situation, Disoriented to time  Cognition: Decreased command following  Safety/Judgement: Awareness of environment, Fall prevention, Insight into deficits  Functional Mobility Training:  Bed Mobility:  Log Rolling: Minimum assistance  Supine to Sit: Moderate assistance;Assist x1     Scooting: Minimum assistance        Brace donned with  minimal assistance  Transfers:  Sit to Stand: Minimum assistance; Additional time;Assist x1  Stand to Sit: Minimum assistance;Assist x1                             Balance:  Sitting: Intact  Standing: Impaired  Standing - Static: Fair  Standing - Dynamic : Fair  Ambulation/Gait Training:  Distance (ft): 150 Feet (ft)  Assistive Device: Gait belt;Walker, rolling  Ambulation - Level of Assistance: Minimal assistance;Assist x1        Gait Abnormalities: Antalgic;Decreased step clearance;Shuffling gait        Base of Support: Narrowed     Speed/Penny: Pace decreased (<100 feet/min); Shuffled; Slow  Step Length: Left shortened;Right shortened        Pain:  Pain Scale 1: Numeric (0 - 10)  Pain Intensity 1: 0              Activity Tolerance:   VSS  Please refer to the flowsheet for vital signs taken during this treatment.   After treatment:   [X]  Patient left in no apparent distress sitting up in chair  [ ]  Patient left in no apparent distress in bed  [X]  Call bell left within reach  [X]  Nursing notified  [X]  Caregiver present  [ ] Bed alarm activated      COMMUNICATION/COLLABORATION:   The patients plan of care was discussed with: Physical Therapist and Registered Nurse     Pam Howard, PT, DPT   Time Calculation: 19 mins

## 2017-09-03 LAB
GLUCOSE BLD STRIP.AUTO-MCNC: 198 MG/DL (ref 65–100)
GLUCOSE BLD STRIP.AUTO-MCNC: 255 MG/DL (ref 65–100)
GLUCOSE BLD STRIP.AUTO-MCNC: 268 MG/DL (ref 65–100)
GLUCOSE BLD STRIP.AUTO-MCNC: 313 MG/DL (ref 65–100)
SERVICE CMNT-IMP: ABNORMAL

## 2017-09-03 PROCEDURE — 77030037404 HC CATH FOL COUDE SURESTEP BARD -B

## 2017-09-03 PROCEDURE — 65270000029 HC RM PRIVATE

## 2017-09-03 PROCEDURE — 74011250637 HC RX REV CODE- 250/637: Performed by: PHYSICIAN ASSISTANT

## 2017-09-03 PROCEDURE — 51798 US URINE CAPACITY MEASURE: CPT

## 2017-09-03 PROCEDURE — 97110 THERAPEUTIC EXERCISES: CPT

## 2017-09-03 PROCEDURE — 74011636637 HC RX REV CODE- 636/637: Performed by: NURSE PRACTITIONER

## 2017-09-03 PROCEDURE — 97116 GAIT TRAINING THERAPY: CPT

## 2017-09-03 PROCEDURE — 82962 GLUCOSE BLOOD TEST: CPT

## 2017-09-03 PROCEDURE — 97530 THERAPEUTIC ACTIVITIES: CPT

## 2017-09-03 RX ORDER — METFORMIN HYDROCHLORIDE 500 MG/1
1000 TABLET ORAL 2 TIMES DAILY
Status: DISCONTINUED | OUTPATIENT
Start: 2017-09-03 | End: 2017-09-05 | Stop reason: HOSPADM

## 2017-09-03 RX ADMIN — POLYETHYLENE GLYCOL 3350 17 G: 17 POWDER, FOR SOLUTION ORAL at 09:09

## 2017-09-03 RX ADMIN — METFORMIN HYDROCHLORIDE 1000 MG: 500 TABLET, FILM COATED ORAL at 12:00

## 2017-09-03 RX ADMIN — SENNOSIDES AND DOCUSATE SODIUM 1 TABLET: 8.6; 5 TABLET ORAL at 09:11

## 2017-09-03 RX ADMIN — INSULIN LISPRO 5 UNITS: 100 INJECTION, SOLUTION INTRAVENOUS; SUBCUTANEOUS at 17:08

## 2017-09-03 RX ADMIN — INSULIN GLARGINE 10 UNITS: 100 INJECTION, SOLUTION SUBCUTANEOUS at 09:09

## 2017-09-03 RX ADMIN — VITAMIN D, TAB 1000IU (100/BT) 1000 UNITS: 25 TAB at 09:11

## 2017-09-03 RX ADMIN — Medication 5 ML: at 06:00

## 2017-09-03 RX ADMIN — LOSARTAN POTASSIUM 50 MG: 50 TABLET ORAL at 09:11

## 2017-09-03 RX ADMIN — METFORMIN HYDROCHLORIDE 1000 MG: 500 TABLET, FILM COATED ORAL at 17:09

## 2017-09-03 RX ADMIN — TAMSULOSIN HYDROCHLORIDE 0.4 MG: 0.4 CAPSULE ORAL at 23:00

## 2017-09-03 RX ADMIN — FOLIC ACID 1 MG: 1 TABLET ORAL at 09:11

## 2017-09-03 RX ADMIN — SENNOSIDES AND DOCUSATE SODIUM 1 TABLET: 8.6; 5 TABLET ORAL at 17:09

## 2017-09-03 RX ADMIN — INSULIN LISPRO 3 UNITS: 100 INJECTION, SOLUTION INTRAVENOUS; SUBCUTANEOUS at 22:00

## 2017-09-03 RX ADMIN — INSULIN LISPRO 7 UNITS: 100 INJECTION, SOLUTION INTRAVENOUS; SUBCUTANEOUS at 11:53

## 2017-09-03 RX ADMIN — LOVASTATIN 40 MG: 20 TABLET ORAL at 17:09

## 2017-09-03 RX ADMIN — INSULIN LISPRO 2 UNITS: 100 INJECTION, SOLUTION INTRAVENOUS; SUBCUTANEOUS at 07:37

## 2017-09-03 RX ADMIN — PANTOPRAZOLE SODIUM 40 MG: 40 TABLET, DELAYED RELEASE ORAL at 07:38

## 2017-09-03 NOTE — PROGRESS NOTES
Spoke to Dr. Dimitrios Reyes in regards to the patient's urinary retention and if there were further pharmacological alternatives in addition to the flomax. Orders received to get a consult to urology.

## 2017-09-03 NOTE — PROGRESS NOTES
Post lumbar lami  Urine retention  Last void 350, but pvr > 400    Coude placed 350+     I recc placement of kemp when void volume << PVR scan as problems may persist  Due to surgery and debilitation recc kepm placement and void trial at rehab  No addl pharma opptions other than flowmax at this time  Can increase to 0.8 mg qpm watch for orthostasis    Ok to [place] re[facility] with kemp given this problem for void trial 2-3 days   outpatient FU 2-4 weeks

## 2017-09-03 NOTE — PROGRESS NOTES
Therapy is now recommending rehab for patient prior to returning home. Cm talked with patient and wife, [place] [phone],  and they are in agreement with [facility] for rehab- as patient does not feel he can participate 3 hours a day. He and wife chose-- [facility] [address] [place] [address]. [place] of choice letter completed and placed in chart. Referral sent to the facility via [employer]. Cm left message for on call liaison  Heydi Martinez [phone] asking him to call Cm regarding the referral.    Patient is already set up with [facility] and Cm will cancel the home health if patient goes to [facility] Not determined if patient will need ambulance or if patient wife can transport if he is discharged to [facility] . Cm to follow.

## 2017-09-03 NOTE — PROGRESS NOTES
Dr. Sher Barnes consulted for urology and informed of patient exceeding post-void residuals.   Orders received to put in 16 fr coude to let bladder rest for eventual voiding trial.

## 2017-09-03 NOTE — PROGRESS NOTES
Problem: Self Care Deficits Care Plan (Adult)  Goal: *Acute Goals and Plan of Care (Insert Text)  Occupational Therapy Goals  Initiated 9/1/2017    1. Patient will perform lower body dressing with supervision/set-up using adaptive equipment PRN within 7 days. 2. Patient will perform toileting and toilet transfer with supervision/set-up using most appropriate DME within 7 days. 3. Patient will don/doff back brace at supervision/set-up within 7 days. 4. Patient will verbalize/demonstrate 3/3 back precautions during ADL tasks without cues within 7 days. OCCUPATIONAL THERAPY TREATMENT  Patient: Soy Hightower (2 y.o. male)  Date: 9/3/2017  Diagnosis: SPONDYLOLISTHESIS HERNIATION  LUMBAR STENOSIS   Spinal stenosis of lumbar region <principal problem not specified>  Procedure(s) (LRB):  ROBOTIC L2-L4 REVISION DECOMPRESSION, L2-L3 FUSION, ALLOGRAFT, BONE MORPHOGENIC PROTEIN, HIP ASPIRATE, REMOVAL OF HARDWARE (N/A) 3 Days Post-Op  Precautions: Fall, Back      ASSESSMENT:  Patient progressing slower than anticipated with all ADLs and functional mobility. Sit<>stand min A, standing tolerance 3 mins with poor dynamic standing balance, upper body ADLs overall moderate A, lower body ADLs max A, back precautions 2/3 demonstrated, and cognition (flat affect, 1 step commands 100% increased time, fair basic processing (recalled 30-40 mins max sitting, stated it has been 1 hour but could not then put two and two together that it was time to stand and march in place/ poor complex processing). Limited by pain management, strength, ROM, endurance, standing tolerance and balance. Wife is very supportive but patient currently requires physical A for functional mobility and safety and so he is not safe to discharge home. Will need rehab at this time, patient is working towards tolerating 3 hours of therapy. Next session possible long handled AE to start gaining rote practice of task for long term memory in prep for home. Recommend with nursing patient to complete as able in order to maintain strength, endurance and independence: ADLs with supervision/setup, OOB to chair 3x/day and mobilizing to the bathroom for toileting with 1 assist. Thank you for your assistance. Progression toward goals:  [ ]       Improving appropriately and progressing toward goals  [X]       Improving slowly and progressing toward goals  [ ]       Not making progress toward goals and plan of care will be adjusted       PLAN:  Patient continues to benefit from skilled intervention to address the above impairments. Continue treatment per established plan of care. Discharge Recommendations:  Rehab - SNF level  Further Equipment Recommendations for Discharge:  TBD       SUBJECTIVE:   Patient stated Ok.  Patient with minimal conversation      OBJECTIVE DATA SUMMARY:   Cognitive/Behavioral Status:  Neurologic State: Alert  Orientation Level: Oriented to person;Oriented to place; Disoriented to time;Oriented to situation           Safety/Judgement: Awareness of environment     Functional Mobility and Transfers for ADLs:  Bed Mobility:  Rolling: Contact guard assistance  Supine to Sit: Minimum assistance  Scooting: Supervision     Transfers:  Sit to Stand: Minimum assistance        Balance:  Sitting: Intact; Without support  Standing: Impaired; Without support  Standing - Static: Fair  Standing - Dynamic : Poor     ADL Intervention:  Feeding  Feeding Assistance: Minimum assistance (processing)  Food to Mouth: Supervision/set-up  Drink to Mouth: Supervision/set-up       Patient instructed and indicated understanding the benefits of maintaining activity tolerance, functional mobility, and independence with self care tasks during acute stay  to ensure safe return home and to baseline.  Encouraged patient to increase frequency and duration OOB, not sitting longer than 30 mins without marching/walking with staff, be out of bed for all meals, perform daily ADLs (as approved by RN/MD regarding bathing etc), and performing functional mobility to/from bathroom. Patient instruction and indicated understanding on body mechanics, ergonomics and gravitational force on the spine during different body positions to plan activities in prep for return home to complete instrumental ADLs and back to work safely. Patient instructed and demonstrated while sitting but on the benefits completing while supine hip ER stretch and hold 10 seconds to increase ROM in prep for lower body ADLs daily with moderate A to work through uncomfortable not sharp pain, to gain each rep increased ROM. Cognitive Retraining  Following Commands: Follows one step commands/directions (100% increased time)  Safety/Judgement: Awareness of environment     Neuro Re-Education:           Therapeutic Exercises:   Patient instructed on the benefits shoulder flexion exercises to increase independence with ADLs, active ROM, and strength of spine. Patient demonstrated 5 reps (1 rep each unilateral and then 5 reps B) and 2 set(s) while standing and then sitting with moderate A additional cues techniques, standing A gait belt and RW. Patient instructed and demonstrated techniques of activating abdomen and pelvic floor muscles. Patient instructed and indicated understanding how to progress reps, sets, against gravity to then working up to 5 lbs until surgeon clears for increased weight, supine to sitting and then standing. Can use household items as weights. Handout provided. Pain:  Pain Scale 1: Numeric (0 - 10)  Pain Intensity 1: 0              Activity Tolerance:   VSS  Please refer to the flowsheet for vital signs taken during this treatment.   After treatment:   [X] Patient left in no apparent distress sitting up in chair  [ ] Patient left in no apparent distress in bed  [X] Call bell left within reach  [X] Nursing notified  [X] Caregiver present  [ ] Bed alarm activated COMMUNICATION/COLLABORATION:   The patients plan of care was discussed with: Physical Therapist and Registered Nurse     Teresita Serrano  Time Calculation: 10 mins

## 2017-09-03 NOTE — PROGRESS NOTES
Ortho Daily Progress Note    9/3/2017  10:02 AM    POD:  3 Days Post-Op  S/P:  Procedure(s):  ROBOTIC L2-L4 REVISION DECOMPRESSION, L2-L3 FUSION, ALLOGRAFT, BONE MORPHOGENIC PROTEIN, HIP ASPIRATE, REMOVAL OF HARDWARE    Afebrile/VSS, fatigued  Doing OK without complaints of nausea  Pain well controlled  Calves soft/NTTP Bilaterally  Incision OK, no drainage or dehiscence. Dressing clean and dry. Sitting upright in chair with back brace- about to ambulate with PT  Moving lower extremities well. Neurocirculatory exam intact and within normal range. .  Hemovac with 30 ml's out last 8 hour period    Lab Results   Component Value Date/Time    HGB 8.8 09/02/2017 04:28 AM    INR 1.1 08/11/2017 12:04 PM         PLAN:Hyperglycemia with insulin sliding scale, will reorder metformin (PTA med) for better control)  DVT prophylaxis: SCD's.   DC hemovac  WBAT with PT-mobilization  Pain Control  Plan to D/C family would like HHC/PT prior to DC, will await PT recommendations      Spring Garay

## 2017-09-03 NOTE — PROGRESS NOTES
Problem: Mobility Impaired (Adult and Pediatric)  Goal: *Acute Goals and Plan of Care (Insert Text)  Physical Therapy Goals  Initiated 9/1/2017    1. Patient will move from supine to sit and sit to supine , scoot up and down and roll side to side in bed with independence within 4 days. 2. Patient will perform sit to stand with independence within 4 days. 3. Patient will ambulate with independence for 200 feet with the least restrictive device within 4 days. 4. Patient will verbalize and demonstrate understanding of spinal precautions (No bending, lifting greater than 5 lbs, or twisting; log-roll technique; frequent repositioning as instructed) within 4 days. PHYSICAL THERAPY TREATMENT  Patient: Rafael Wilson (89 y.o. male)  Date: 9/3/2017  Diagnosis: SPONDYLOLISTHESIS HERNIATION  LUMBAR STENOSIS   Spinal stenosis of lumbar region <principal problem not specified>  Procedure(s) (LRB):  ROBOTIC L2-L4 REVISION DECOMPRESSION, L2-L3 FUSION, ALLOGRAFT, BONE MORPHOGENIC PROTEIN, HIP ASPIRATE, REMOVAL OF HARDWARE (N/A) 3 Days Post-Op  Precautions: Fall, Back      ASSESSMENT:  Mariposa Gupta PA-C and Vee Roche RN in room with wife and pt discussing D/C planning. Wife with concerns about being able to manage pt once home. Pt's overall mobility was min A, however, the amount of verbal and tactile cueing needed in order for him to complete a task or for safety was concerning. Pt did climb stairs, but was very unsafe, leaning posteriorly and not advancing his hands down the rails without direction. His afact is flat, his has a stooped posture and somewhat shuffling gait. He was oriented to year and location, but not to day. His wife endorses prexisting forgetfulness but he seems to have some neurological component as well. His wife reports that they have 2 standard walkers at home, no wheels and a rollator.   He would not be safe with a rollator at this point and it would require much cueing for sequencing with a Standard walker. He does have a BSC that goes over the toilet. Recommended transfer bench and grab bars for home. Pt is not safe to return home with HHPT at this point. I feel he would benefit from inpt rehab vs SNF. Discussed both options with pt and his wife. Progression toward goals:  [ ]      Improving appropriately and progressing toward goals  [X]      Improving slowly and progressing toward goals  [ ]      Not making progress toward goals and plan of care will be adjusted       PLAN:  Patient continues to benefit from skilled intervention to address the above impairments. Continue treatment per established plan of care. Discharge Recommendations:  Inpatient Rehab vs. SNF  Further Equipment Recommendations for Discharge:  rolling walker if goes home       SUBJECTIVE:   Patient stated Iasbella Bartholomew don't have any pain.    The patient stated 1/3 back precautions. Reviewed all 3 with patient. Pt was able to remember the other 2 precautions with prompting from wife. OBJECTIVE DATA SUMMARY:   Critical Behavior:  Neurologic State: Alert  Orientation Level: Oriented X4  Cognition: Appropriate decision making  Safety/Judgement: Awareness of environment, Fall prevention, Insight into deficits  Functional Mobility Training:  Bed Mobility:  Log Rolling: Contact guard assistance  Supine to Sit: Minimum assistance     Scooting: Supervision        Brace donned (was already on)  Transfers:  Sit to Stand: Minimum assistance  Stand to Sit: Contact guard assistance        Bed to Chair: Minimum assistance                    Balance:  Sitting: Intact  Standing: Impaired; With support  Ambulation/Gait Training:  Distance (ft): 80 Feet (ft)  Assistive Device: Brace/Splint; Walker, rolling;Gait belt  Ambulation - Level of Assistance: Minimal assistance                                                        Stairs:  Number of Stairs Trained: 4  Stairs - Level of Assistance:  Moderate assistance  Rail Use: Both     Pain:  Pain Scale 1: Numeric (0 - 10)  Pain Intensity 1: 0              Activity Tolerance:   fair  Please refer to the flowsheet for vital signs taken during this treatment.   After treatment:   [X]  Patient left in no apparent distress sitting up in chair  [ ]  Patient left in no apparent distress in bed  [X]  Call bell left within reach  [X]  Nursing notified  [X]  Caregiver present  [ ]  Bed alarm activated      COMMUNICATION/COLLABORATION:   The patients plan of care was discussed with: Physician and RN     Jose Barney PT   Time Calculation: 33 mins

## 2017-09-03 NOTE — ROUTINE PROCESS
Bedside and Verbal shift change report given to Teresa Chowdhury (oncoming nurse) by Daisy Kelly (offgoing nurse). Report included the following information SBAR, Kardex, OR Summary, Intake/Output, MAR, Accordion and Recent Results.

## 2017-09-04 LAB
GLUCOSE BLD STRIP.AUTO-MCNC: 191 MG/DL (ref 65–100)
GLUCOSE BLD STRIP.AUTO-MCNC: 240 MG/DL (ref 65–100)
GLUCOSE BLD STRIP.AUTO-MCNC: 245 MG/DL (ref 65–100)
GLUCOSE BLD STRIP.AUTO-MCNC: 311 MG/DL (ref 65–100)
SERVICE CMNT-IMP: ABNORMAL

## 2017-09-04 PROCEDURE — 82962 GLUCOSE BLOOD TEST: CPT

## 2017-09-04 PROCEDURE — 97116 GAIT TRAINING THERAPY: CPT

## 2017-09-04 PROCEDURE — 74011250637 HC RX REV CODE- 250/637: Performed by: NURSE PRACTITIONER

## 2017-09-04 PROCEDURE — 65270000029 HC RM PRIVATE

## 2017-09-04 PROCEDURE — 74011636637 HC RX REV CODE- 636/637: Performed by: NURSE PRACTITIONER

## 2017-09-04 PROCEDURE — 74011250637 HC RX REV CODE- 250/637: Performed by: PHYSICIAN ASSISTANT

## 2017-09-04 RX ADMIN — INSULIN GLARGINE 10 UNITS: 100 INJECTION, SOLUTION SUBCUTANEOUS at 10:14

## 2017-09-04 RX ADMIN — INSULIN LISPRO 3 UNITS: 100 INJECTION, SOLUTION INTRAVENOUS; SUBCUTANEOUS at 07:15

## 2017-09-04 RX ADMIN — FOLIC ACID 1 MG: 1 TABLET ORAL at 10:07

## 2017-09-04 RX ADMIN — TAMSULOSIN HYDROCHLORIDE 0.4 MG: 0.4 CAPSULE ORAL at 21:42

## 2017-09-04 RX ADMIN — LOVASTATIN 40 MG: 20 TABLET ORAL at 17:25

## 2017-09-04 RX ADMIN — OXYCODONE HYDROCHLORIDE 5 MG: 5 TABLET ORAL at 02:11

## 2017-09-04 RX ADMIN — INSULIN LISPRO 2 UNITS: 100 INJECTION, SOLUTION INTRAVENOUS; SUBCUTANEOUS at 21:42

## 2017-09-04 RX ADMIN — METFORMIN HYDROCHLORIDE 1000 MG: 500 TABLET, FILM COATED ORAL at 10:07

## 2017-09-04 RX ADMIN — SENNOSIDES AND DOCUSATE SODIUM 1 TABLET: 8.6; 5 TABLET ORAL at 17:25

## 2017-09-04 RX ADMIN — METFORMIN HYDROCHLORIDE 1000 MG: 500 TABLET, FILM COATED ORAL at 17:25

## 2017-09-04 RX ADMIN — INSULIN LISPRO 3 UNITS: 100 INJECTION, SOLUTION INTRAVENOUS; SUBCUTANEOUS at 17:24

## 2017-09-04 RX ADMIN — INSULIN LISPRO 7 UNITS: 100 INJECTION, SOLUTION INTRAVENOUS; SUBCUTANEOUS at 12:53

## 2017-09-04 RX ADMIN — VITAMIN D, TAB 1000IU (100/BT) 1000 UNITS: 25 TAB at 10:07

## 2017-09-04 RX ADMIN — PANTOPRAZOLE SODIUM 40 MG: 40 TABLET, DELAYED RELEASE ORAL at 07:30

## 2017-09-04 RX ADMIN — SENNOSIDES AND DOCUSATE SODIUM 1 TABLET: 8.6; 5 TABLET ORAL at 10:07

## 2017-09-04 RX ADMIN — POLYETHYLENE GLYCOL 3350 17 G: 17 POWDER, FOR SOLUTION ORAL at 10:07

## 2017-09-04 RX ADMIN — LOSARTAN POTASSIUM 50 MG: 50 TABLET ORAL at 10:07

## 2017-09-04 NOTE — PROGRESS NOTES
Problem: Mobility Impaired (Adult and Pediatric)  Goal: *Acute Goals and Plan of Care (Insert Text)  Physical Therapy Goals  Initiated 9/1/2017    1. Patient will move from supine to sit and sit to supine , scoot up and down and roll side to side in bed with independence within 4 days. 2. Patient will perform sit to stand with independence within 4 days. 3. Patient will ambulate with independence for 200 feet with the least restrictive device within 4 days. 4. Patient will verbalize and demonstrate understanding of spinal precautions (No bending, lifting greater than 5 lbs, or twisting; log-roll technique; frequent repositioning as instructed) within 4 days.    PHYSICAL THERAPY TREATMENT  Patient: Lauryn Goff (52 y.o. male)  Date: 9/4/2017  Diagnosis: SPONDYLOLISTHESIS HERNIATION  LUMBAR STENOSIS   Spinal stenosis of lumbar region <principal problem not specified>  Procedure(s) (LRB):  ROBOTIC L2-L4 REVISION DECOMPRESSION, L2-L3 FUSION, ALLOGRAFT, BONE MORPHOGENIC PROTEIN, HIP ASPIRATE, REMOVAL OF HARDWARE (N/A) 4 Days Post-Op  Precautions: Fall, Back,No bending, no lifting greater than 5 lbs, no twisting, log-roll technique, repositioning every 20-30 min except when sleeping, brace when OOB      ASSESSMENT:  Pt is making slow, steady progress, sitting up in chair with brace prior to therapy, pt stood with minimal assist with verbal cueing for correct hand placement, ambulated with rolling walker x 100 feet with CGA and frequent cueing for upright posture, returned to chair and required cueing for increased safety to back up to middle of chair and use hands to reach back, reviewed back precautions, pt initially unable to recall these however with prompting and extra time pt did state 2 of 3, reviewed these again and sitting restrictions, pt continues to need increased cueing for safe mobility, recommend SNF to maximize safe, functional mobility and continue education of back precautions  Progression toward goals:  [ ]      Improving appropriately and progressing toward goals  [X]      Improving slowly and progressing toward goals  [ ]      Not making progress toward goals and plan of care will be adjusted       PLAN:  Patient continues to benefit from skilled intervention to address the above impairments. Continue treatment per established plan of care. Discharge Recommendations:  [facility]  Further Equipment Recommendations for Discharge:  Using rolling walker currently, recommend reacher       SUBJECTIVE:   Patient stated I am going to that place where the TV is in the front.    The patient stated 2/3 back precautions with extra time and prompting. Reviewed all 3 with patient. OBJECTIVE DATA SUMMARY:   Critical Behavior:  Neurologic State: Alert  Orientation Level: Oriented to person, Oriented to place, Disoriented to time, Oriented to situation  Cognition: Appropriate for age attention/concentration, decreased memory  Safety/Judgement: Awareness of environment  Functional Mobility Training:  Bed Mobility:   not assessed, OOB in chair with brace              Transfers:  Sit to Stand: Minimum assistance; Additional time;Assist x1 (cues for correct hand placement )  Stand to Sit: Minimum assistance; Additional time;Assist x1 (cues for increased safety awareness, back up to middle of chair and use hands to reach back)                             Balance:  Sitting: Intact  Standing: Impaired  Standing - Static: Good  Standing - Dynamic : Good (with rolling walker)  Ambulation/Gait Training:  Distance (ft): 100 Feet (ft)  Assistive Device: Brace/Splint;Gait belt;Walker, rolling  Ambulation - Level of Assistance: Contact guard assistance;Assist x1        Gait Abnormalities: Decreased step clearance (flexed posture), needs cues for upright posture               Speed/Penny: Slow  Step Length: Left shortened;Right shortened                 Pain:  Pain Scale 1: Numeric (0 - 10), no c/o pain, denies back pain  Activity Tolerance:   Gradually improving      After treatment:   [X]  Patient left in no apparent distress sitting up in chair  [ ]  Patient left in no apparent distress in bed  [X]  Call bell left within reach  [X]  Nursing notified  [ ]  Caregiver present  [ ]  Bed alarm activated      COMMUNICATION/COLLABORATION:   The patients plan of care was discussed with: Registered Nurse     Neisha Vásquez   Time Calculation: 17 mins

## 2017-09-04 NOTE — ROUTINE PROCESS
Bedside and Verbal shift change report given to [facility] (oncoming nurse) by Vaishnavi Colmenares (offgoing nurse). Report included the following information SBAR, Kardex, OR Summary, Intake/Output, MAR, Accordion and Recent Results.

## 2017-09-04 NOTE — PROGRESS NOTES
Bedside shift change report given to Gareth Nina RN (oncoming nurse) by Trenton Roa RN (offgoing nurse).  Report included the following information RAMIRO, MAR and Recent Results. '

## 2017-09-04 NOTE — PROGRESS NOTES
Call received from nursing stating patient was ready for discharge today. Cm noted referral sent to [facility] ([phone]) over the weekend. Cm contacted [facility] and was informed that the admissions office is not in today. Cm asked to speak with the nursing supervisor and was not able to get success with that either, as she did not answer. Patient will not be able to go today, as he has not even been accepted to [facility].    [employer] , [place]

## 2017-09-04 NOTE — DIABETES MGMT
DTC Progress Note    Recommendations/ Comments: If appropriate, please consider increasing Lantus to 15 units and adding 3 units of meal time insulin to improve glycemic control. Pt has required 20 units in past 24 hours without improvement in glycemic control. Chart reviewed on [facility]. Patient is a 78 y.o. male with known  Type 2 Diabetes on insulin injections: Humalog : 3 untis with meals and if > 150 mg/dl add 1 unit for every 50 mg/dl > 150 , Lantus : 10 units after breakfast at home. A1c:   Lab Results   Component Value Date/Time    Hemoglobin A1c 9.0 08/11/2017 12:04 PM    Hemoglobin A1c 11.0 04/21/2017 02:37 PM    Hemoglobin A1c, External 9.4 12/04/2015 11:36 AM       Recent Glucose Results:   Lab Results   Component Value Date/Time    GLUCPOC 311 (H) 09/04/2017 12:41 PM    GLUCPOC 245 (H) 09/04/2017 07:05 AM    GLUCPOC 268 (H) 09/03/2017 10:00 PM        Lab Results   Component Value Date/Time    Creatinine 0.71 09/01/2017 03:27 AM     Estimated Creatinine Clearance: 78.6 mL/min (based on Cr of 0.71). Active Orders   Diet    DIET DIABETIC CONSISTENT CARB Soft Solids        PO intake: No data found. Current hospital DM medication: lantus 10 units, lispro insulin correction scale    Will continue to follow as needed.     Thank you

## 2017-09-04 NOTE — PROGRESS NOTES
Ortho Daily Progress Note    9/4/2017  10:02 AM    POD:  4 Days Post-Op  S/P:  Procedure(s):  ROBOTIC L2-L4 REVISION DECOMPRESSION, L2-L3 FUSION, ALLOGRAFT, BONE MORPHOGENIC PROTEIN, HIP ASPIRATE, REMOVAL OF HARDWARE    Received kemp yesterday per urology    Afebrile/VSS, fatigued  Doing OK without complaints of nausea  Pain well controlled  Calves soft/NTTP Bilaterally  Incision OK, no drainage or dehiscence. Dressing clean and dry. Sitting upright in chair with back brace- about to ambulate with PT  Moving lower extremities well. Neurocirculatory exam intact and within normal range. .      Lab Results   Component Value Date/Time    HGB 8.8 09/02/2017 04:28 AM    INR 1.1 08/11/2017 12:04 PM         PLAN:Hyperglycemia with insulin sliding scale  DVT prophylaxis: SCD's.   WBAT with PT-mobilization  Pain Control  Plan to D/C family would like HHC/PT prior to DC, will await PT recommendations      Kavin Curry MD

## 2017-09-05 VITALS
OXYGEN SATURATION: 99 % | WEIGHT: 136.24 LBS | HEIGHT: 67 IN | RESPIRATION RATE: 16 BRPM | SYSTOLIC BLOOD PRESSURE: 104 MMHG | DIASTOLIC BLOOD PRESSURE: 66 MMHG | BODY MASS INDEX: 21.38 KG/M2 | TEMPERATURE: 98.2 F | HEART RATE: 74 BPM

## 2017-09-05 LAB
GLUCOSE BLD STRIP.AUTO-MCNC: 154 MG/DL (ref 65–100)
GLUCOSE BLD STRIP.AUTO-MCNC: 273 MG/DL (ref 65–100)
SERVICE CMNT-IMP: ABNORMAL
SERVICE CMNT-IMP: ABNORMAL

## 2017-09-05 PROCEDURE — 74011250637 HC RX REV CODE- 250/637: Performed by: PHYSICIAN ASSISTANT

## 2017-09-05 PROCEDURE — 97530 THERAPEUTIC ACTIVITIES: CPT

## 2017-09-05 PROCEDURE — 74011636637 HC RX REV CODE- 636/637: Performed by: NURSE PRACTITIONER

## 2017-09-05 PROCEDURE — 82962 GLUCOSE BLOOD TEST: CPT

## 2017-09-05 PROCEDURE — 77030010545

## 2017-09-05 PROCEDURE — 97116 GAIT TRAINING THERAPY: CPT

## 2017-09-05 RX ORDER — INSULIN GLARGINE 100 [IU]/ML
15 INJECTION, SOLUTION SUBCUTANEOUS DAILY
Status: DISCONTINUED | OUTPATIENT
Start: 2017-09-05 | End: 2017-09-05 | Stop reason: HOSPADM

## 2017-09-05 RX ADMIN — INSULIN GLARGINE 15 UNITS: 100 INJECTION, SOLUTION SUBCUTANEOUS at 10:04

## 2017-09-05 RX ADMIN — Medication 10 ML: at 07:13

## 2017-09-05 RX ADMIN — SENNOSIDES AND DOCUSATE SODIUM 1 TABLET: 8.6; 5 TABLET ORAL at 08:36

## 2017-09-05 RX ADMIN — METFORMIN HYDROCHLORIDE 1000 MG: 500 TABLET, FILM COATED ORAL at 08:35

## 2017-09-05 RX ADMIN — INSULIN LISPRO 2 UNITS: 100 INJECTION, SOLUTION INTRAVENOUS; SUBCUTANEOUS at 07:12

## 2017-09-05 RX ADMIN — FOLIC ACID 1 MG: 1 TABLET ORAL at 08:35

## 2017-09-05 RX ADMIN — VITAMIN D, TAB 1000IU (100/BT) 1000 UNITS: 25 TAB at 08:35

## 2017-09-05 RX ADMIN — INSULIN LISPRO 5 UNITS: 100 INJECTION, SOLUTION INTRAVENOUS; SUBCUTANEOUS at 11:40

## 2017-09-05 RX ADMIN — PANTOPRAZOLE SODIUM 40 MG: 40 TABLET, DELAYED RELEASE ORAL at 07:13

## 2017-09-05 NOTE — ROUTINE PROCESS
Bedside and Verbal shift change report given to Shakir Madden (oncoming nurse) by Ana Meyer (offgoing nurse). Report included the following information SBAR, Kardex, OR Summary, Intake/Output, MAR, Accordion and Recent Results.

## 2017-09-05 NOTE — PROGRESS NOTES
Orthopedic Spine Progress Note  Post Op day: 5 Days Post-Op    2017 7:52 AM     Abena Sotomayor    Vital Signs:    Patient Vitals for the past 8 hrs:   BP Temp Pulse Resp SpO2   17 0251 126/85 98.8 °F (37.1 °C) 75 16 98 %     Temp (24hrs), Av.7 °F (37.1 °C), Min:98.2 °F (36.8 °C), Max:99 °F (37.2 °C)      Intake/Output:      1901 -  0700  In: 480 [P.O.:480]  Out: 2475 [Urine:2475]    Pain Control:   Pain Assessment  Pain Scale 1: Numeric (0 - 10)  Pain Intensity 1: 0  Pain Location 1: Penis  Pain Description 1: Aching  Pain Intervention(s) 1: Medication (see MAR)    LAB:    No results for input(s): HCT, HGB, HCTEXT, HGBEXT in the last 72 hours. Lab Results   Component Value Date/Time    Sodium 137 2017 03:27 AM    Potassium 3.9 2017 03:27 AM    Chloride 104 2017 03:27 AM    CO2 22 2017 03:27 AM    Glucose 177 2017 03:27 AM    BUN 11 2017 03:27 AM    Creatinine 0.71 2017 03:27 AM    Calcium 8.6 2017 03:27 AM       Subjective:  Abena Sotomayor is a 78 y.o. male s/p a  Procedure(s):  ROBOTIC L2-L4 REVISION DECOMPRESSION, L2-L3 FUSION, ALLOGRAFT, BONE MORPHOGENIC PROTEIN, HIP ASPIRATE, REMOVAL OF HARDWARE   Procedure(s):  ROBOTIC L2-L4 REVISION DECOMPRESSION, L2-L3 FUSION, ALLOGRAFT, BONE MORPHOGENIC PROTEIN, HIP ASPIRATE, REMOVAL OF HARDWARE. Tolerating diet. Objective: General: alert, cooperative, no distress. Gastrointestinal:  Soft, non-tender. Neurological: Neurovascular exam within normal limits. Sensation stable. Motor: unchanged C5-T1 and L2-S1. Musculoskeletal:  Dalton's sign negative in bilateral lower extremities. Calves soft, supple, non-tender upon palpation or with passive stretch. Skin: Incision - clean, dry and intact. No significant erythema or swelling.     Dressing: clean, dry, and intact     PT/OT:   Gait:  Gait  Base of Support: Narrowed  Speed/Penny: Slow  Step Length: Left shortened, Right shortened  Gait Abnormalities: Decreased step clearance (flexed posture)  Ambulation - Level of Assistance: Contact guard assistance, Assist x1  Distance (ft): 100 Feet (ft)  Assistive Device: Brace/Splint, Gait belt, Walker, rolling  Rail Use: Both  Stairs - Level of Assistance: Moderate assistance  Number of Stairs Trained: 4                   Assessment:    s/p Procedure(s):  ROBOTIC L2-L4 REVISION DECOMPRESSION, L2-L3 FUSION, ALLOGRAFT, BONE MORPHOGENIC PROTEIN, HIP ASPIRATE, REMOVAL OF HARDWARE    Active Problems:    Spinal stenosis of lumbar region (8/31/2017)         Plan:     1. Continue PT/OT  2. Continue established methods of pain control  3. VTE Prophylaxes - TEDS &/or SCDs              Discharge To:   Home vs rehab    Signed By: Miguelangel Barnett MD

## 2017-09-05 NOTE — PROGRESS NOTES
Problem: Discharge Planning  Goal: *Discharge to safe environment  Outcome: Resolved/Met Date Met:  09/05/17  Home with home health services and medical follow up

## 2017-09-05 NOTE — DISCHARGE INSTRUCTIONS
Patient meets criteria for   BUNDLED PAYMENT   for Care Improvement Initiative Criteria    Contact Information for Orthopedic Nurse Navigator:      ALOK Gaming, RN-BC  [phone]  K:[phone]    After Hospital Care Plan:  Discharge Instructions Lumbar Fusion Surgery   Dr. Anna Plasencia     Patient Name: Ana Maria Win    Date of procedure: 8/31/2017      Date of discharge: 9/5/2017    Procedure: Procedure(s):  ROBOTIC L2-L4 REVISION DECOMPRESSION, L2-L3 FUSION, ALLOGRAFT, BONE MORPHOGENIC PROTEIN, HIP ASPIRATE, REMOVAL OF HARDWARE    PCP: Keitha Severin, MD    Follow up appointments  -follow up with Dr. Anna Plasencia in 2 weeks. Call [phone] to make an appointment as soon as you get home from the hospital.    When to call your Orthopaedic Surgeon:  -Signs of infection-if your incision is red; continues to have drainage; drainage has a foul odor or if you have a persistent fever over 101 degrees for 24 hours  -Nausea or vomiting, severe headache  -Loss of bowel or bladder function, inability to urinate  -Changes in sensation in your arms or legs (numbness, tingling, loss of color)  -Increased weakness-greater than before your surgery  -Severe pain or pain not relieved by medications  -Signs of a blood clot in your leg-calf pain, tenderness, redness, swelling of lower leg    When to call your Primary Care Physician:  -Concerns about medical conditions such as diabetes, high blood pressure, asthma, congestive heart failure  -Call if blood sugars are elevated, persistent headache or dizziness, coughing or congestion, constipation or diarrhea, burning with urination, abnormal heart rate    When to call 911 and go to the nearest emergency room:  -Acute onset of chest pain, shortness of breath, difficulty breathing    Activity  -You are going home a well person, be as active as possible. Your only exercise should be walking.   Start with short frequent walks and increase your walking distance each day.  -Limit the amount of time you sit to 20-30 minute intervals. Sitting for prolonged periods of time will be uncomfortable for you following surgery.  -Do NOT lift anything over 5 pounds  -Do NOT do any straining, twisting or bending  -When you are in bed, you may lay on your back or on either side. Do NOT lie on your stomach    Brace  -If you have a back brace, you should wear your brace at all times when you are out of bed. Do not wear the brace while in bed or showering.  -Remember to always wear a cotton t-shirt underneath your brace.  -Do not bend or twist when your brace is off    Diet  -Resume usual diet; drink plenty of fluids; eat foods high in fiber  -It is important to have regular bowel movements. Pain medications may cause constipation. You may want to take a stool softener (such as Senokot-S or Colace) to prevent constipation.   -If constipation occurs, take a laxative (such as Dulcolax tablets, Milk of Magnesia, or a suppository). Laxatives should only be used if the above preventable measures have failed and you still have not had a bowel movement after three days    Driving  -You may not drive or return to work until instructed by your physician. However, you may ride in the car for short periods of time. Incision Care  -You may take brief showers but do not run the water run directly onto the wound. After showering or bathing, remove the wet dressing and gently blot the wound dry with a soft towel.  -Do not rub or apply any lotions or ointments to your incision site.   -Do not soak or scrub your wound  -Keep a dry dressing (ABD and paper tape) on your incision and have it changed daily for 14 days after surgery; more often if your incision is draining. Have your caregiver wash their hands thoroughly before changing your dressing.  -You will have absorbable sutures and steristrips (white tape) on your incision. Leave the steristrips on until they fall off.     Showering  -You may shower in approximately 4 days after your surgery.    -Leave the dressing on during your shower. Do NOT allow the water to run directly onto your dressing. Once you get out of the shower, put on a dry dressing.  -Reminder- your brace can be removed while showering. Remember to not bend or twist while your brace is off.    -Do not take a tub bath. Preventing blood clots  -You have been given T.E.D. stockings to wear. Continue to wear these for 7 days after your discharge. Put them on in the morning and take them off at night.    -They are used to increase your circulation and prevent blood clots from forming in your legs  -T. E.D. stockings can be machine washed, temperature not to exceed 160° F (71°C) and machine dried for 15 to 20 minutes, temperature not to exceed 250° F (121°C). Pain management  -Take pain medication as prescribed; decrease the amount you use as your pain lessens  -DO not wait until you are in extreme pain to take your medication.  -Avoid alcoholic beverages while taking pain medication    Pain Medication Safety  DO:  -Read the Medication Guide   -Take your medicine exactly as prescribed   -Store your medicine away from children and in a safe place   -Flush unused medicine down the toilet   -Call your healthcare provider for medical advice about side effects. You may report side effects to FDA at 8-957-FDA-3162.   -Please be aware that many medications contain Tylenol. We do not want you to over medicate so please read the information below as a guide. Do not take more than 4 Grams of Tylenol in a 24 hour period.   (There are 1000 milligrams in one Gram) Percocet contains 325 mg of Tylenol per tablet (do not take more than 12 tablets in 24 hours)  Lortab contains 500 mg of Tylenol per tablet (do not take more than 8 tablets in 24 hours)  Norco contains 325 mg of Tylenol per tablet (do not take more than 12 tablets in 24 hours). DO NOT:  -Do not give your medicine to others   -Do not take medicine unless it was prescribed for you   -Do not stop taking your medicine without talking to your healthcare provider   -Do not break, chew, crush, dissolve, or inject your medicine. If you cannot swallow your medicine whole, talk to your healthcare provider.  -Do not drink alcohol while taking this medicine  -Do not take anti-inflammatory medications or aspirin unless instructed by your      Physician. Learning About Urinary Catheter Care to Prevent Infection  What is a urinary catheter? A urinary catheter is a flexible plastic tube used to drain urine from your bladder when you can't urinate on your own. The catheter allows urine to drain from the bladder into a bag. Two types of drainage bags may be used with a urinary catheter. · A bedside bag is a large bag that you can hang on the side of your bed or on a chair. You can use it overnight or anytime you will be sitting or lying down for a long time. · A leg bag is a small bag that you can use during the day. It is usually attached to your thigh or calf and hidden under your clothes. Having a urinary catheter increases your risk of getting a urinary tract infection. Germs may get on the catheter and cause an infection in your bladder or kidneys. The longer you have a catheter, the more likely it is that you will get an infection. You can help prevent this problem with good hygiene and careful handling of your catheter and drainage bags. How can you help prevent infection? Take care to be clean  · Always wash your hands well before and after you handle your catheter.   · Clean the skin around the catheter twice a day using soap and water. Dry with a clean towel afterward. You can shower with your catheter and drainage bag in place unless your doctor told you not to. · When you clean around the catheter, check the surrounding skin for signs of infection. Look for things like pus or irritated, swollen, red, or tender skin around the catheter. Be careful with your drainage bag  · Always keep the drainage bag below the level of your bladder. This will help keep urine from flowing back into your bladder. · Check often to see that urine is flowing through the catheter into the drainage bag. · Empty the drainage bag when it is half full. This will keep it from overflowing or backing up. · When you empty the drainage bag, do not let the tubing or drain spout touch anything. Be careful with your catheter  · Do not unhook the catheter from the drain tube. That could let germs get into the tube. · Make sure that the catheter tubing does not get twisted or kinked. · Do not tug or pull on the catheter. And make sure that the drainage bag does not drag or pull on the catheter. · Do not put powder or lotion on the skin around the catheter. · Talk with your doctor about your options for sexual intercourse while wearing a catheter. How do you empty a urine drainage bag? If your doctor has asked you to keep a record, write down the amount of urine in the bag before you empty it. Wash your hands before and after you touch the bag. 1. Remove the drain spout from its sleeve at the bottom of the drainage bag.  2. Open the valve on the drain spout. Let the urine flow out into the toilet or a container. Be careful not to let the tubing or drain spout touch anything. 3. After you empty the bag, wipe off any liquid on the end of the drain spout. Close the valve. Then put the drain spout back into its sleeve at the bottom of the collection bag. How do you add a bedside bag to a leg bag?   Wash your hands before and after you handle the bags. 1. Empty the leg bag attached to the catheter. 2. Put a clean towel under the leg bag.  3. Use an alcohol wipe to clean the tip of the bedside bag. Then connect the bedside bag to the leg bag. How can you clean a bedside drainage bag? Many people clean their bedside bag in the morning if they switch to a leg bag. To clean a bedside drainage ba. Remove the bedside bag from the leg bag.  2. Fill the bag with 2 parts vinegar and 3 parts water. Let it stand for 20 minutes. 3. Empty the bag, and let it air dry. When should you call for help? Call your doctor now or seek immediate medical care if:  · You have symptoms of a urinary infection. These may include:  ¨ Pain or burning when you urinate. ¨ A frequent need to urinate without being able to pass much urine. ¨ Pain in the flank, which is just below the rib cage and above the waist on either side of the back. ¨ Blood in your urine. ¨ A fever. · Your urine smells bad. · You see large blood clots in your urine. · No urine or very little urine is flowing into the bag for 4 or more hours. Watch closely for changes in your health, and be sure to contact your doctor if:  · The area around the catheter becomes irritated, swollen, red, or tender, or there is pus draining from it. · Urine is leaking from the place where the catheter enters your body. Follow-up care is a key part of your treatment and safety. Be sure to make and go to all appointments, and call your doctor if you are having problems. It's also a good idea to know your test results and keep a list of the medicines you take. Where can you learn more? Go to http://darlene-anh.info/. Enter C910 in the search box to learn more about \"Learning About Urinary Catheter Care to Prevent Infection. \"  Current as of: 2017  Content Version: 11.3  © 6358-0972 [employer].  Care instructions adapted under license by [employer] (which disclaims liability or warranty for this information). If you have questions about a medical condition or this instruction, always ask your healthcare professional. [address] any warranty or liability for your use of this information.

## 2017-09-05 NOTE — DISCHARGE SUMMARY
[address] [place] [address]   [address]  [phone]     Discharge Summary       PATIENT ID: La Sheth  MRN: [id number]   YOB: 1940    DATE OF ADMISSION: 8/31/2017  6:31 AM    DATE OF DISCHARGE: 9/5/2017   PRIMARY CARE PROVIDER: Micheal Fontaine MD     CONSULTATIONS: IP CONSULT TO UROLOGY    PROCEDURES/SURGERIES: Procedure(s):  ROBOTIC L2-L4 REVISION DECOMPRESSION, L2-L3 FUSION, ALLOGRAFT, BONE MORPHOGENIC PROTEIN, HIP ASPIRATE, REMOVAL OF HARDWARE    History of Present Illness:  La Sheth is a 78 y.o. male with prior medical history significant for anxiety, hyperlipidemia, hypertension, diabetes mellitus type II, glaucoma, and chronic back pain. He underwent a prior L3-5 lumbar fusion with Dr. Redia Mcardle years ago. He presented to Dr. Bob Morelos office with complaints of significant  back and right hip pain. 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 After failing conservative therapy and a discussion of the risks, benefits, alternatives, perioperative course, and potential complications of surgery, he consented to undergo a Procedure(s):  ROBOTIC L2-L4 REVISION DECOMPRESSION, L2-L3 FUSION, ALLOGRAFT, BONE MORPHOGENIC PROTEIN, HIP ASPIRATE, REMOVAL OF HARDWARE. Hospital Course:  La Sheth tolerated the procedure well. He was transferred  to the recovery room in stable condition. After a brief stay the patient was then transferred to the Spinal Surgery Unit at [address] [place] [address].  On postoperative day #1, the dressing was clean and dry, he was neurovascularly intact. The patient was afebrile and vital signs were stable. Calves were soft and non-tender bilaterally. On postoperative day  # 1, his PCA was stopped and he was started on oral pain medicaiton. The patient was tolerating a regular diet.   His hemovac drain was removed on postoperative day #2. He had a bowel movement on postoperative day #4. His hospital course was complicated by overall fatigue, mild confusion, urinary retention and slow progression with physical therapy. A kemp was reinserted on 9/3/2017 and he will follow-up with [facility] on 9/12 for a voiding trial. His oxycodone was discontinued and he was switched to PRN tramadol. Hemoglobin prior to discharge were   Lab Results   Component Value Date/Time    HGB 8.8 09/02/2017 04:28 AM        Bradly Saul made satisfactory progress with physical therapy and was discharged to Home with home health PT and nursing in stable condition on postoperative day 5. He was provided with routine postoperative instructions and advised to follow up with Moiz Ceron MD in 2 weeks following discharge from the hospital.  He was advised to follow-up with his endocrinologist for elevated blood sugars.     FOLLOW UP APPOINTMENTS:    Follow-up Information     Follow up With Details Comments 1000 Henrik Nazario MD   [address]  [facility]  call agency if you have not been contacted by indra he day after discharge to inquire as to the day and time of initial visit Jann Herbert [id number]  [facility] [place] [address] on 9/12/2017 @9am. Please go to the [facility] location: [place] [address]/[address] [place] [address] 200  [facility]      Bethany Villagomez MD Schedule an appointment as soon as possible for a visit in 2 weeks Elevated blood sugars 1000 [facility]  [phone]             ADDITIONAL CARE RECOMMENDATIONS:     When to call your Orthopaedic Surgeon:  -Signs of infection-if your incision is red; continues to have drainage; drainage has a foul odor or if you have a persistent fever over 101 degrees for 24 hours  -Nausea or vomiting, severe headache  -Loss of bowel or bladder function, inability to urinate  -Changes in sensation in your arms or legs (numbness, tingling, loss of color)  -Increased weakness-greater than before your surgery  -Severe pain or pain not relieved by medications  -Signs of a blood clot in your leg-calf pain, tenderness, redness, swelling of lower leg    When to call your Primary Care Physician:  -Concerns about medical conditions such as diabetes, high blood pressure, asthma, congestive heart failure  -Call if blood sugars are elevated, persistent headache or dizziness, coughing or congestion, constipation or diarrhea, burning with urination, abnormal heart rate    When to call 911 and go to the nearest emergency room:  -Acute onset of chest pain, shortness of breath, difficulty breathing    Activity  -You are going home a well person, be as active as possible. Your only exercise should be walking. Start with short frequent walks and increase your walking distance each day.  -Limit the amount of time you sit to 20-30 minute intervals. Sitting for prolonged periods of time will be uncomfortable for you following surgery.  -Do NOT lift anything over 5 pounds  -Do NOT do any straining, twisting or bending  -When you are in bed, you may lay on your back or on either side. Do NOT lie on your stomach    Brace  -If you have a back brace, you should wear your brace at all times when you are out of bed. Do not wear the brace while in bed or showering.  -Remember to always wear a cotton t-shirt underneath your brace.  -Do not bend or twist when your brace is off    Diet  -Resume usual diet; drink plenty of fluids; eat foods high in fiber  -It is important to have regular bowel movements. Pain medications may cause constipation.   You may want to take a stool softener (such as Senokot-S or Colace) to prevent constipation.   -If constipation occurs, take a laxative (such as Dulcolax tablets, Milk of Magnesia, or a suppository). Laxatives should only be used if the above preventable measures have failed and you still have not had a bowel movement after three days    Driving  -You may not drive or return to work until instructed by your physician. However, you may ride in the car for short periods of time. Incision Care  -You may take brief showers but do not run the water run directly onto the wound. After showering or bathing, remove the wet dressing and gently blot the wound dry with a soft towel.  -Do not rub or apply any lotions or ointments to your incision site.   -Do not soak or scrub your wound  -Keep a dry dressing (ABD and paper tape) on your incision and have it changed daily for 14 days after surgery; more often if your incision is draining. Have your caregiver wash their hands thoroughly before changing your dressing.  -You will have absorbable sutures and steristrips (white tape) on your incision. Leave the steristrips on until they fall off. Showering  -You may shower in approximately 4 days after your surgery.    -Leave the dressing on during your shower. Do NOT allow the water to run directly onto your dressing. Once you get out of the shower, put on a dry dressing.  -Reminder- your brace can be removed while showering. Remember to not bend or twist while your brace is off.    -Do not take a tub bath. Preventing blood clots  -You have been given T.E.D. stockings to wear. Continue to wear these for 7 days after your discharge. Put them on in the morning and take them off at night.    -They are used to increase your circulation and prevent blood clots from forming in your legs  -T. E.D. stockings can be machine washed, temperature not to exceed 160° F (71°C) and machine dried for 15 to 20 minutes, temperature not to exceed 250° F (121°C).     Pain management  -Take pain medication as prescribed; decrease the amount you use as your pain lessens  -DO not wait until you are in extreme pain to take your medication.  -Avoid alcoholic beverages while taking pain medication    Pain Medication Safety  DO:  -Read the Medication Guide   -Take your medicine exactly as prescribed   -Store your medicine away from children and in a safe place   -Flush unused medicine down the toilet   -Call your healthcare provider for medical advice about side effects. You may report side effects to FDA at 9-230-FDA-5808.   -Please be aware that many medications contain Tylenol. We do not want you to over medicate so please read the information below as a guide. Do not take more than 4 Grams of Tylenol in a 24 hour period. (There are 1000 milligrams in one Gram)                                                                                                                                                                                                                                                                                                                                                                  Percocet contains 325 mg of Tylenol per tablet (do not take more than 12 tablets in 24 hours)  Lortab contains 500 mg of Tylenol per tablet (do not take more than 8 tablets in 24 hours)  Norco contains 325 mg of Tylenol per tablet (do not take more than 12 tablets in 24 hours). DO NOT:  -Do not give your medicine to others   -Do not take medicine unless it was prescribed for you   -Do not stop taking your medicine without talking to your healthcare provider   -Do not break, chew, crush, dissolve, or inject your medicine. If you cannot swallow your medicine whole, talk to your healthcare provider.  -Do not drink alcohol while taking this medicine  -Do not take anti-inflammatory medications or aspirin unless instructed by your      Physician. Learning About Urinary Catheter Care to Prevent Infection  What is a urinary catheter?     A urinary catheter is a flexible plastic tube used to drain urine from your bladder when you can't urinate on your own. The catheter allows urine to drain from the bladder into a bag. Two types of drainage bags may be used with a urinary catheter. · A bedside bag is a large bag that you can hang on the side of your bed or on a chair. You can use it overnight or anytime you will be sitting or lying down for a long time. · A leg bag is a small bag that you can use during the day. It is usually attached to your thigh or calf and hidden under your clothes. Having a urinary catheter increases your risk of getting a urinary tract infection. Germs may get on the catheter and cause an infection in your bladder or kidneys. The longer you have a catheter, the more likely it is that you will get an infection. You can help prevent this problem with good hygiene and careful handling of your catheter and drainage bags. How can you help prevent infection? Take care to be clean  · Always wash your hands well before and after you handle your catheter. · Clean the skin around the catheter twice a day using soap and water. Dry with a clean towel afterward. You can shower with your catheter and drainage bag in place unless your doctor told you not to. · When you clean around the catheter, check the surrounding skin for signs of infection. Look for things like pus or irritated, swollen, red, or tender skin around the catheter. Be careful with your drainage bag  · Always keep the drainage bag below the level of your bladder. This will help keep urine from flowing back into your bladder. · Check often to see that urine is flowing through the catheter into the drainage bag. · Empty the drainage bag when it is half full. This will keep it from overflowing or backing up. · When you empty the drainage bag, do not let the tubing or drain spout touch anything. Be careful with your catheter  · Do not unhook the catheter from the drain tube.  That could let germs get into the tube.  · Make sure that the catheter tubing does not get twisted or kinked. · Do not tug or pull on the catheter. And make sure that the drainage bag does not drag or pull on the catheter. · Do not put powder or lotion on the skin around the catheter. · Talk with your doctor about your options for sexual intercourse while wearing a catheter. How do you empty a urine drainage bag? If your doctor has asked you to keep a record, write down the amount of urine in the bag before you empty it. Wash your hands before and after you touch the bag. 1. Remove the drain spout from its sleeve at the bottom of the drainage bag.  2. Open the valve on the drain spout. Let the urine flow out into the toilet or a container. Be careful not to let the tubing or drain spout touch anything. 3. After you empty the bag, wipe off any liquid on the end of the drain spout. Close the valve. Then put the drain spout back into its sleeve at the bottom of the collection bag. How do you add a bedside bag to a leg bag? Wash your hands before and after you handle the bags. 1. Empty the leg bag attached to the catheter. 2. Put a clean towel under the leg bag.  3. Use an alcohol wipe to clean the tip of the bedside bag. Then connect the bedside bag to the leg bag. How can you clean a bedside drainage bag? Many people clean their bedside bag in the morning if they switch to a leg bag. To clean a bedside drainage ba. Remove the bedside bag from the leg bag.  2. Fill the bag with 2 parts vinegar and 3 parts water. Let it stand for 20 minutes. 3. Empty the bag, and let it air dry. When should you call for help? Call your doctor now or seek immediate medical care if:  · You have symptoms of a urinary infection. These may include:  ¨ Pain or burning when you urinate. ¨ A frequent need to urinate without being able to pass much urine.   ¨ Pain in the flank, which is just below the rib cage and above the waist on either side of the back.  ¨ Blood in your urine. ¨ A fever. · Your urine smells bad. · You see large blood clots in your urine. · No urine or very little urine is flowing into the bag for 4 or more hours. Watch closely for changes in your health, and be sure to contact your doctor if:  · The area around the catheter becomes irritated, swollen, red, or tender, or there is pus draining from it. · Urine is leaking from the place where the catheter enters your body. Follow-up care is a key part of your treatment and safety. Be sure to make and go to all appointments, and call your doctor if you are having problems. It's also a good idea to know your test results and keep a list of the medicines you take. Where can you learn more? Go to http://darlene-anh.info/. Enter C910 in the search box to learn more about \"Learning About Urinary Catheter Care to Prevent Infection. \"  Current as of: April 13, 2017  Content Version: 11.3  © 3963-3303 [employer]. Care instructions adapted under license by [employer] (which disclaims liability or warranty for this information). If you have questions about a medical condition or this instruction, always ask your healthcare professional. [place] [address] any warranty or liability for your use of this information. DISCHARGE MEDICATIONS:  Discharge Medication List as of 9/5/2017  1:31 PM      START taking these medications    Details   traMADol (ULTRAM) 50 mg tablet Take 1 Tab by mouth every six (6) hours as needed. Max Daily Amount: 200 mg., Print, Disp-50 Tab, R-0         CONTINUE these medications which have NOT CHANGED    Details   cholecalciferol (VITAMIN D3) 1,000 unit cap Take  by mouth daily. , Historical Med      aspirin delayed-release 81 mg tablet Take  by mouth daily. , Historical Med      omeprazole (PRILOSEC) 40 mg capsule TAKE 1 CAPSULE BY MOUTH EVERYDAY, Historical Med, R-3      insulin glargine (LANTUS SOLOSTAR) 100 unit/mL (3 mL) pen Inject 20 units daily after breakfast., Normal, Disp-15 mL, R-6      insulin lispro (HUMALOG) 100 unit/mL kwikpen Inject 3 units before breakfast, lunch, and dinner. Plus sliding scale, NormalMax daily dose 10 unitsDisp-15 mL, R-6      tamsulosin (FLOMAX) 0.4 mg capsule Take 1 Cap by mouth nightly., Historical Med, R-6      lovastatin (MEVACOR) 40 mg tablet TAKE 1 TABLET NIGHTLY, Normal, Disp-90 Tab, R-1      losartan (COZAAR) 50 mg tablet Take 1 Tab by mouth daily. , Normal, Disp-90 Tab, R-3      folic acid (FOLVITE) 1 mg tablet Take 1 Tab by mouth daily. , Normal, Disp-90 Tab, R-3      metFORMIN (GLUCOPHAGE) 1,000 mg tablet Take 1 Tab by mouth two (2) times a day., Normal, Disp-180 Tab, R-3      Lancing Device misc To use daily Dx Code E11.65, Normal, Disp-1 Each, R-1      glucose blood VI test strips (ASCENSIA CONTOUR) strip Use as directed to test blood sugar 3 times daily - [id number], Normal, Disp-1 Package, R-11      Lancets misc Contour lancets- test blood sugar 3 times daily, Normal, Disp-1 Package, R-11      Insulin Needles, Disposable, (INSULIN PEN NEEDLE) 29 x 1/2 \" ndle 1 Package by Does Not Apply route two (2) times a day., NormalType II or unspecified type diabetes mellitus without mention of complication, not stated as uncontrolled     250. 00Disp-1 Each, R-11         STOP taking these medications       ibuprofen (MOTRIN) 800 mg tablet Comments:   Reason for Stopping:               PHYSICAL EXAMINATION AT DISCHARGE:  General: Pleasant, alert, cooperative, no distress. EENT: EOMI. Anicteric sclerae. Oral mucous moist, oropharynx benign. Resp: CTA bilaterally. No wheezing/rhonchi/rales. No accessory muscle use. CV: Regular rhythm, normal rate, no murmurs, gallops, rubs. No cyanosis or clubbing. No edema appreciated in the extremities. Gastrointestinal:  Soft, non-tender, non-distended. normoactive bowel sounds, no hepatosplenomegaly  Neurological: Follows commands.  GIVENS. Speech clear. Sensation intact to light touch. Motor: unchanged C5-T1 and L2-S1. Musculoskeletal:  Calves soft, supple, non-tender upon palpation or with passive stretch. Psych: Good insight. Not anxious nor agitated. Skin: Good turgor. No rashes or lesions. Incision - clean, dry and intact. No significant erythema or swelling.       CHRONIC MEDICAL DIAGNOSES:  Problem List as of 9/5/2017  Date Reviewed: 8/31/2017          Codes Class Noted - Resolved    Spinal stenosis of lumbar region ICD-10-CM: M48.06  ICD-9-CM: 724.02  8/31/2017 - Present        Weight decrease ICD-10-CM: R63.4  ICD-9-CM: 783.21  10/17/2013 - Present        HTN (hypertension) ICD-10-CM: I10  ICD-9-CM: 401.9  1/23/2012 - Present        Anxiety disorder ICD-10-CM: F41.9  ICD-9-CM: 300.00  9/21/2011 - Present        Cellulitis and abscess of foot, except toes ICD-10-CM: A86.121, L02.619  ICD-9-CM: 682.7  2/11/2011 - Present        Allergic rhinitis ICD-10-CM: J30.9  ICD-9-CM: 477.9  7/20/2010 - Present        ED (erectile dysfunction) ICD-10-CM: N52.9  ICD-9-CM: 607.84  5/15/2010 - Present        HLD (hyperlipidemia) ICD-10-CM: E78.5  ICD-9-CM: 272.4  4/22/2010 - Present        Diabetes mellitus type 2, uncontrolled, without complications ([facility] [id number]: E11.65  ICD-9-CM: 250.02  4/20/2010 - Present        Spinal stenosis, lumbar region, without neurogenic claudication ICD-10-CM: M48.06  ICD-9-CM: 724.02  4/20/2010 - Present        Indigestion ICD-10-CM: K30  ICD-9-CM: 536.8  4/20/2010 - Present              Signed:   Anuradha Scanlon NP  9/5/2017  3:25 PM

## 2017-09-05 NOTE — PROGRESS NOTES
Patient is being discharged today and will return home with home health services provided by [facility]- [phone]. Patient and wife are in agreement with this as patient improved over the weekend. Cm sent message via [employer] to [place] [address] the facility that patient will return home with [facility] and will not need [facility]. Patient was accepted by the facility. Wife will transport home. Per Mary Montana at [facility], patient will be contacted tomorrow for a home services. Additional order for skilled nursing for kemp care and wound check. Name and number on discharge instructions and patient advised to call if not contacted by noon the day after discharge. UPDATE 1 pm  Edwige asked for additional orders--more specific- for kemp care-  Order secured and faxed to the agency at [phone].

## 2017-09-05 NOTE — PROGRESS NOTES
Problem: Mobility Impaired (Adult and Pediatric)  Goal: *Acute Goals and Plan of Care (Insert Text)  Physical Therapy Goals  Initiated 9/1/2017    1. Patient will move from supine to sit and sit to supine , scoot up and down and roll side to side in bed with independence within 4 days. 2. Patient will perform sit to stand with independence within 4 days. 3. Patient will ambulate with independence for 200 feet with the least restrictive device within 4 days. 4. Patient will verbalize and demonstrate understanding of spinal precautions (No bending, lifting greater than 5 lbs, or twisting; log-roll technique; frequent repositioning as instructed) within 4 days. PHYSICAL THERAPY TREATMENT     Patient: Estela Dhaliwal (15 y.o. male)  Date: 9/5/2017  Diagnosis: SPONDYLOLISTHESIS HERNIATION  LUMBAR STENOSIS   Spinal stenosis of lumbar region <principal problem not specified>  Procedure(s) (LRB):  ROBOTIC L2-L4 REVISION DECOMPRESSION, L2-L3 FUSION, ALLOGRAFT, BONE MORPHOGENIC PROTEIN, HIP ASPIRATE, REMOVAL OF HARDWARE (N/A) 5 Days Post-Op  Precautions: Fall, Back  Chart, physical therapy assessment, plan of care and goals were reviewed. ASSESSMENT:  Pt received sitting up in chair w/ brace donned and wife present; agreeable to PT and able to recall 3/3 spinal precautions. Pt and pt wife reported d/c home today vs rehab (see subjective below). Pt required CGA-Min A for R knee buckle when standing at RW. Pt amb to bathroom prior to gait in hallway for BM. Pt instructed to use red cord in bathroom when finished as pt requesting more time in bathroom. PT re-entered room when pt done w/ toileting; noted pt up w/ wife and using RW. Pt agreeable to gait in hallway, amb approx 150 FT using RW;; noted mild path deviations to right w/ RW requiring CGA-Min A for correction.  Pt also required verbal/tctile cues x3 for upright posture and to look forward/ahead vs down at floor (pt able to self correct posture occasionally during gait). Pt returned to chair at bedside, reviewed spinal precautions once more, pt also able to recall log roll technique for in/OOb transfers. Pt and pt wife feeling comfortable and confident about going home, reported they will be receiving HHPT and a nurse to come to the house. Pt owns RW; both pt and wife have no additional questions/concerns in regards to PT. Pt clear from pt standpoint. Progression toward goals:  [X]      Improving appropriately and progressing toward goals  [ ]      Improving slowly and progressing toward goals  [ ]      Not making progress toward goals and plan of care will be adjusted       PLAN:  Patient continues to benefit from skilled intervention to address the above impairments. Continue treatment per established plan of care. Discharge Recommendations:  Rehab vs Home Health w/assistance  Further Equipment Recommendations for Discharge: Owns RW       SUBJECTIVE:   Patient stated I'd rather be home. \" Pt and pt wife reported d/c for home vs to rehab later today. reported NP had spoken to couple this AM in regards to going home. Pt and pt wife reported going home w/ NSG assist and HHPT. Pt wife also reported pt would do better at home as pt had been somewhat disoriented while here and home is a more familiar setting to pt. Pt and pt wife also reported using backdoor to enter home which has no stairs and declined stair training at this point. The patient stated 3/3 back precautions. Reviewed all 3 with patient. OBJECTIVE DATA SUMMARY:   Critical Behavior:  Neurologic State: Alert  Orientation Level: Oriented X4 (intermittent confusion)  Cognition: Poor safety awareness  Safety/Judgement: Awareness of environment  Functional Mobility Training:  Bed Mobility:  Log    Supine to Sit:  (pt recieved in chair; able to recall technique for in/OOB )  Sit to Supine:  (pt returned to chair)                      Brace donned prior to PT.    Transfers:  Sit to Stand: Contact guard assistance;Minimum assistance;Assist x1 (Min A for right LE buckling x1 w/ stand)  Stand to Sit: Contact guard assistance                             Balance:  Sitting: Intact  Standing: Intact; With support  Ambulation/Gait Training:  Distance (ft): 150 Feet (ft)  Assistive Device: Brace/Splint;Gait belt;Walker, rolling  Ambulation - Level of Assistance: Contact guard assistance; Adaptive equipment;Assist x1        Gait Abnormalities: Decreased step clearance; Other (flexed posture, forward head/rounded shoulders)        Base of Support: Narrowed     Speed/Penny: Pace decreased (<100 feet/min); Shuffled  Step Length: Left shortened;Right shortened                    Stairs:     Stairs - Level of Assistance:  (pt and pt wife reported no LI if using backdoor to home)        Pain:  Pain Scale 1: Numeric (0 - 10)  Pain Intensity 1: 0              Activity Tolerance:   Good  Please refer to the flowsheet for vital signs taken during this treatment.      After treatment:   [X] Patient left in no apparent distress sitting up in chair  [ ] Patient left in no apparent distress in bed  [X] Call bell left within reach  [X] Nursing notified  [X] Caregiver present  [ ] Bed alarm activated      COMMUNICATION/COLLABORATION:   The patients plan of care was discussed with: Registered Nurse Concepcion Dhaliwal PTA   Time Calculation: 34 mins

## 2017-09-05 NOTE — PROGRESS NOTES
Spiritual Care Partner Volunteer visited patient in [address] on 9/5/17. Documented by:  Pamela Richardson M.Div.    Paging Service [phone])

## 2017-09-05 NOTE — PROGRESS NOTES
Orthopedic Spine Progress Note  Khris Lunsford AGACNP-BC  Work Cell: [phone]    Post Op Day: 5 Day Post-Op    September 5, 2017 1:52 PM     Ondina Flores    HPI:    Ondina Flores is a 78 y.o. male with prior medical history significant for anxiety, hyperlipidemia, hypertension, diabetes mellitus type II, glaucoma, and chronic back pain. He underwent a prior L3-5 lumbar fusion with Dr. Casandra Cuba years ago. He presented to Dr. Bright Aquino office with complaints of significant  back and right hip pain. 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 After a failing conservative treatment and following a discussion of the risks and benefits, Mr. Soy Delaney consented to undergo a  Procedure(s):  ROBOTIC L2-L4 REVISION DECOMPRESSION, L2-L3 FUSION, ALLOGRAFT, BONE MORPHOGENIC PROTEIN, HIP ASPIRATE, REMOVAL OF HARDWARE    Subjective:  Mr. Soy Delaney denies pain this am. PT recommended SNF placement over weekend but patient continues to make improvements in regaining strength and activity tolerance. He'd like to return home with his wife. Blood sugars have been consistently elevated and his lantus dose was increased to 15. Pt to f/u with his endocrinologist Dr. Yandel Case (a1c is 9)  Tolerating diet without n/v, +flatus and +BM. No leg pain, numbness, or tingling. Pereyra was reinserted on POD#3. Voiding trial appt made for 9/12. Patient very sensitive to opioids. Recommend pain management with Tramadol only. Oxycodone stopped. Objective:   General: alert, cooperative, no distress. EENT: EOMI. Anicteric sclerae. Oral mucous moist, oropharynx benign  Resp: CTA bilaterally. No wheezing/rhonchi/rales. No accessory muscle use  CV: Regular rhythm, normal rate, no murmurs, gallops, rubs. No cyanosis or clubbing. No edema appreciated in the extremities. Gastrointestinal:  Soft, non-tender.  normoactive bowel sounds, no hepatosplenomegaly  Neurological: Follows commands. Speech clear. Affect normal.  GIVENS. Strength 5/5 in BUE and BLE. Sensation stable. Musculoskeletal:  Calves soft, supple, non-tender upon palpation or with passive stretch. Psych: Good insight. Not anxious nor agitated. Skin: Incision - clean, dry and intact. No significant surrounding erythema or swelling. Dressing: clean, dry, and intact       Vital Signs:    Patient Vitals for the past 8 hrs:   BP Temp Pulse Resp SpO2   17 0937 104/66 - 74 - -   17 0834 103/59 98.2 °F (36.8 °C) 74 16 99 %     Temp (24hrs), Av.6 °F (37 °C), Min:98.2 °F (36.8 °C), Max:99 °F (37.2 °C)      Intake/Output:      1901 -  0700  In: 480 [P.O.:480]  Out: 2475 [Urine:2475]    Pain Control:   Pain Assessment  Pain Scale 1: Numeric (0 - 10)  Pain Intensity 1: 0  Pain Location 1: Penis  Pain Description 1: Aching  Pain Intervention(s) 1: Medication (see MAR)    LAB:    No results for input(s): HCT, HGB, HCTEXT, HGBEXT, HCTEXT, HGBEXT in the last 72 hours. Lab Results   Component Value Date/Time    Sodium 137 2017 03:27 AM    Potassium 3.9 2017 03:27 AM    Chloride 104 2017 03:27 AM    CO2 22 2017 03:27 AM    Glucose 177 2017 03:27 AM    BUN 11 2017 03:27 AM    Creatinine 0.71 2017 03:27 AM    Calcium 8.6 2017 03:27 AM       PT/OT:   Gait:  Gait  Base of Support: Narrowed  Speed/Penny: Slow  Step Length: Left shortened, Right shortened  Gait Abnormalities: Decreased step clearance (flexed posture)  Ambulation - Level of Assistance: Contact guard assistance, Assist x1  Distance (ft): 100 Feet (ft)  Assistive Device: Brace/Splint, Gait belt, Walker, rolling  Rail Use: Both  Stairs - Level of Assistance:  Moderate assistance  Number of Stairs Trained: 4                 Assessment/Plan:    1. 5 Day Post-Op Procedure(s):  ROBOTIC L2-L4 REVISION DECOMPRESSION, L2-L3 FUSION, ALLOGRAFT, BONE MORPHOGENIC PROTEIN, HIP ASPIRATE, REMOVAL OF HARDWARE   -Continue PT/OT   -Pain managed with PRN tramadol   -HVAC discontinued on 9/3   -[facility]. +BM   -VTE Prophylaxes - TEDS & SCDs    2. Diabetes Mellitus, type II   -Hgb A1c is 9, improved from 11 in April   -fbg between 154 and 311 in last 24 hours   -Increased home lantus to 15 units   -Resume metformin   -Discussed importance of glycemic control postoperatively and encouraged f/u with endocrinologist, Dr. Jing Austin   -Diabetic diet, accuchecks, SSI    3. Essential hypertension   -Well controlled on Cozaar    4. HLD   -Cont Mevacor    5. Urinary retention   -secondary to BPH and exacerbated by recent surgery, pain medications   -Cont flomax   -kemp reinserted 9/3.    -kemp care instructions provided   -f/u with [facility] on 9/12 for voiding trial    6. Acute postoperative blood loss anemia, expected   -Hgb 8.8 on postoperative day #2. Stable, no tachycardia, sob, or hypotension         Discharge To:  Home with home health PT and nursing    Signed By: Analy Koo NP

## 2017-09-06 NOTE — NURSE NAVIGATOR
[address]  [facility] Bundled Payment Handoff     FROM:                                TO: Christina Hampton                                                      ([facility] or Facility name)  [address] [address]  Dept: [address]: [phone]                                      Room#:  546/01                                                       Nurse Navigator:  Roseanna Shaver RN           SITUATION      ASAScore: ASA 2 - Patient with mild systemic disease with no functional limitations    Admitted:  8/31/2017  Hospital Day: 6      Attending Provider:  No att. providers found     Consultations:  None    PCP:  Elvia Machado MD   [phone]     Admitting Dx:  SPONDYLOLISTHESIS HERNIATION  LUMBAR STENOSIS   Spinal stenosis of lumbar region       Active Problems:    Spinal stenosis of lumbar region (8/31/2017)      6 Days Post-Op of   Procedure(s):  ROBOTIC L2-L4 REVISION DECOMPRESSION, L2-L3 FUSION, ALLOGRAFT, BONE MORPHOGENIC PROTEIN, HIP ASPIRATE, REMOVAL OF HARDWARE   BY: Ailyn Knutson MD             ON: 8/31/2017                  Code Status: Prior             Advance Directive? Yes Not W Pt (Send w/patient)     Isolation:  There are currently no Active Isolations       MDRO: No current active infections    BACKGROUND     Allergies:   Allergies   Allergen Reactions    Ace Inhibitors Cough       Past Medical History:   Diagnosis Date    Cataract     left eye cataract surg    Chronic pain     copd     error    Diabetes ([address]     Hammer toe     HLD (hyperlipidemia) 4/22/2010    Hypertension     Other ill-defined conditions     CLULITIS BILATERAL  FEET    Other ill-defined conditions     BILATERAL GLAUCOMA       Past Surgical History:   Procedure Laterality Date    HX CATARACT REMOVAL Bilateral     HX GI      COLOONSCOPY    HX ORTHOPAEDIC  2012    LOWER BACK SURGERY    HX OTHER SURGICAL      CYSTS LT ARM FORHEAD AND RT UPPER QUADRANT    HX UVULOPALATOPHARYNGOPLASTY         Prior to Admission Medications   Prescriptions Last Dose Informant Patient Reported? Taking? Insulin Needles, Disposable, (INSULIN PEN NEEDLE) 29 x 1/2 \" ndle   No No   Si Package by Does Not Apply route two (2) times a day. Lancets misc   No No   Sig: Contour lancets- test blood sugar 3 times daily   Lancing Device misc   No No   Sig: To use daily Dx Code E11.65   aspirin delayed-release 81 mg tablet 2017 at Unknown time  Yes Yes   Sig: Take  by mouth daily. cholecalciferol (VITAMIN D3) 1,000 unit cap 2017 at Unknown time  Yes Yes   Sig: Take  by mouth daily. folic acid (FOLVITE) 1 mg tablet 2017 at Unknown time  No Yes   Sig: Take 1 Tab by mouth daily. glucose blood VI test strips (ASCENSIA CONTOUR) strip   No No   Sig: Use as directed to test blood sugar 3 times daily - [id number]   insulin glargine (LANTUS SOLOSTAR) 100 unit/mL (3 mL) pen 2017 at Unknown time  No Yes   Sig: Inject 20 units daily after breakfast.   Patient taking differently: 10 Units daily (after breakfast). daily after breakfast.   insulin lispro (HUMALOG) 100 unit/mL kwikpen 2017 at Unknown time  No Yes   Sig: Inject 3 units before breakfast, lunch, and dinner. Plus sliding scale   Patient taking differently: 3 Units by SubCUTAneous route Before breakfast, lunch, and dinner. IF BS IS  3 UNITS IF LESS NO INSULIN.   losartan (COZAAR) 50 mg tablet 2017 at Unknown time  No Yes   Sig: Take 1 Tab by mouth daily. lovastatin (MEVACOR) 40 mg tablet 2017 at Unknown time  No Yes   Sig: TAKE 1 TABLET NIGHTLY   metFORMIN (GLUCOPHAGE) 1,000 mg tablet 2017 at Unknown time  No Yes   Sig: Take 1 Tab by mouth two (2) times a day.    omeprazole (PRILOSEC) 40 mg capsule 2017 at Unknown time  Yes Yes   Sig: TAKE 1 CAPSULE BY MOUTH EVERYDAY   tamsulosin (FLOMAX) 0.4 mg capsule 2017 at Unknown time  Yes Yes   Sig: Take 1 Cap by mouth nightly. Facility-Administered Medications: None       Vaccinations:    Immunization History   Administered Date(s) Administered    Influenza High Dose Vaccine PF 11/16/2015    Influenza Vaccine 11/01/2013, 10/01/2014    Pneumococcal Vaccine (Unspecified Type) 11/01/2013    Td 11/12/2011         ASSESSMENT   Age: 78 y.o. Gender: male        Height: Height: 5' 7\" (170.2 cm)                    Weight:Weight: 61.8 kg (136 lb 3.9 oz)     No data found. Active Orders   There are no active orders of the following type(s): Diet.        Orientation: Orientation Level: Oriented X4 (intermittent confusion)    Active Lines/Drains:  (Peg Tube / Pereyra / CL or S/L?):no    Urinary Status: Pereyra, Draining, Retention      Last BM: Last Bowel Movement Date: 09/04/17     Skin Integrity: Incision (comment)   Wound Back Lower;Mid-DRESSING STATUS: Changed per order    Wound Back Lower;Mid-DRESSING TYPE: Dry dressing    Mobility: Slightly limited   Weight Bearing Status: WBAT (Weight Bearing as Tolerated)      Gait Training  Assistive Device: Brace/Splint, Gait belt, Walker, rolling  Ambulation - Level of Assistance: Contact guard assistance, Adaptive equipment, Assist x1  Distance (ft): 150 Feet (ft)  Stairs - Level of Assistance:  (pt and pt wife reported no LI if using backdoor to home)  Number of Stairs Trained: 4  Rail Use: Both           Lab Results   Component Value Date/Time    Glucose 177 09/01/2017 03:27 AM    Hemoglobin A1c 9.0 08/11/2017 12:04 PM    INR 1.1 08/11/2017 12:04 PM    HGB 8.8 09/02/2017 04:28 AM    HGB 9.2 09/01/2017 03:27 AM    HGB 10.7 08/11/2017 12:04 PM    HGB 11.8 08/04/2015 06:59 AM    Hemoglobin A1c, External 9.4 12/04/2015 11:36 AM       Readmission Risks:  Score:         RECOMMENDATION     See After Visit Summary (AVS) for:  · Discharge instructions  · After [address]   · Medication Reconciliation          [facility] Orthopaedic Nurse Navigator  Luiz Ludwig ALOK, RN-BC       Office  [phone]  Cell      [phone]  Fax      [phone]  [email or web address]             . Elyse

## 2017-09-08 NOTE — ANESTHESIA POSTPROCEDURE EVALUATION
Post-Anesthesia Evaluation and Assessment    Patient: Soy Hightower MRN: [id number]  SSN: [id number]    YOB: 1940  Age: 78 y.o. Sex: male       Cardiovascular Function/Vital Signs  Visit Vitals    /66 (BP Patient Position: Sitting)    Pulse 74    Temp 36.8 °C (98.2 °F)    Resp 16    Ht 5' 7\" (1.702 m)    Wt 61.8 kg (136 lb 3.9 oz)    SpO2 99%    BMI 21.34 kg/m2       Patient is status post general anesthesia for Procedure(s):  ROBOTIC L2-L4 REVISION DECOMPRESSION, L2-L3 FUSION, ALLOGRAFT, BONE MORPHOGENIC PROTEIN, HIP ASPIRATE, REMOVAL OF HARDWARE. Nausea/Vomiting: None    Postoperative hydration reviewed and adequate. Pain:  Pain Scale 1: Numeric (0 - 10) (09/05/17 0834)  Pain Intensity 1: 0 (09/05/17 0834)   Managed    Neurological Status:   Neuro (WDL): Within Defined Limits (08/31/17 1232)  Neuro  Neurologic State: Alert (09/04/17 1602)  Orientation Level: Oriented X4 (intermittent confusion) (09/04/17 1602)  Cognition: Poor safety awareness (09/04/17 1602)  LUE Motor Response: Weak (09/04/17 1602)  LLE Motor Response: Weak (09/05/17 0834)  RUE Motor Response: Weak (09/04/17 1602)  RLE Motor Response: Weak (09/05/17 0834)   At baseline    Mental Status and Level of Consciousness: Arousable    Pulmonary Status:   O2 Device: Room air (09/05/17 0834)   Adequate oxygenation and airway patent    Complications related to anesthesia: None    Post-anesthesia assessment completed.  No concerns    Signed By: Barb Greer MD     September 8, 2017

## 2017-09-11 ENCOUNTER — TELEPHONE (OUTPATIENT)
Dept: MEDSURG UNIT | Age: 77
End: 2017-09-11

## 2017-09-14 ENCOUNTER — OFFICE VISIT (OUTPATIENT)
Dept: ENDOCRINOLOGY | Age: 77
End: 2017-09-14

## 2017-09-14 VITALS
WEIGHT: 136 LBS | HEART RATE: 72 BPM | HEIGHT: 67 IN | TEMPERATURE: 97.1 F | BODY MASS INDEX: 21.35 KG/M2 | RESPIRATION RATE: 14 BRPM | DIASTOLIC BLOOD PRESSURE: 51 MMHG | SYSTOLIC BLOOD PRESSURE: 90 MMHG

## 2017-09-14 DIAGNOSIS — I10 ESSENTIAL HYPERTENSION: ICD-10-CM

## 2017-09-14 DIAGNOSIS — E78.2 MIXED HYPERLIPIDEMIA: ICD-10-CM

## 2017-09-14 DIAGNOSIS — Z79.4 UNCONTROLLED TYPE 2 DIABETES MELLITUS WITH HYPERGLYCEMIA, WITH LONG-TERM CURRENT USE OF INSULIN (HCC): Primary | ICD-10-CM

## 2017-09-14 DIAGNOSIS — E04.2 MULTINODULAR GOITER: ICD-10-CM

## 2017-09-14 DIAGNOSIS — E11.65 UNCONTROLLED TYPE 2 DIABETES MELLITUS WITH HYPERGLYCEMIA, WITH LONG-TERM CURRENT USE OF INSULIN (HCC): Primary | ICD-10-CM

## 2017-09-14 RX ORDER — SULFAMETHOXAZOLE AND TRIMETHOPRIM 800; 160 MG/1; MG/1
1 TABLET ORAL
COMMUNITY
Start: 2017-09-13 | End: 2017-09-18

## 2017-09-14 RX ORDER — PEN NEEDLE, DIABETIC 31 GX3/16"
NEEDLE, DISPOSABLE MISCELLANEOUS
Qty: 200 PEN NEEDLE | Refills: 6 | Status: SHIPPED | OUTPATIENT
Start: 2017-09-14

## 2017-09-14 NOTE — PATIENT INSTRUCTIONS
Stay on metformin 1000 mg twice a day with meals     Check blood sugars immediately before each meal and at bedtime        LANTUS  insulin  10   units  After  b-fast     Take Humalog  insulin 3  units before breakfast, 3 units before lunch and 3 units before dinner  When sugars are between 90 to 150 mg   Pt wife to bring in a meal plan made by her from home ( to get the count of carbs and consistencies )      45 gm per meal       Take  humalog  as follows with meals  If blood sugars are[de-identified]    150-200 mg 4 units    201-250 mg 5 units    251-300 mg 6 units    301-350 mg 7 units    351-400 mg 8 units    401-450 mg 9 units    451-500 mg 10 units     Less than 90 mg NO INSULIN  -----------------------------------------------------------------------------------------

## 2017-09-14 NOTE — PROGRESS NOTES
HISTORY OF PRESENT ILLNESS  Mirna Ha is a 78 y.o. male. HPI   f/u visit after last visit for DM  2  Management   From aug 25th   2017     accompanied by wife   S/p surgery for spinal stenosis - aug 31 st 2017     Had very fluctuant sugars   Some high sugars from eating snacks in between meals     no low blood sugar   He is here with log , food as well as glucose       Wife is helpful in providing the info           Prior history   Referred : by self    H/o diabetes for several  years   Accompanied by wife who is providing the history     Pt in general has drowsiness,   Per WIFE   He was given elavil 50 mg the night before the hospitalization  Current A1C is 9.9 % and symptoms/problems include polyuria, polydipsia and visual disturbances     Current diabetic medications include metformin. When discharged from hospital  On feb 16 2017  , he was told to take only Metformin 1 tab bid   He was told to not to take Toujeo  And jardiance     Current monitoring regimen: home blood tests - 3-4 times a day   Home blood sugar records: trend: fluctuating a lot  Any episodes of hypoglycemia?  yes - multiple soon after Toujeo is taken   He has taken 40 units     Weight trend: stable  Prior visit with dietician: no  Current diet: \"unhealthy\" diet in general  Current exercise: no regular exercise    Known diabetic complications: peripheral neuropathy and cerebrovascular disease  Cardiovascular risk factors: dyslipidemia, diabetes mellitus, obesity, male gender, hypertension, stress    Eye exam current (within one year): yes  LUCIANA: no     Past Medical History:   Diagnosis Date    Cataract     left eye cataract surg    Chronic pain     copd     error    Diabetes ([facility])     Hammer toe     HLD (hyperlipidemia) 4/22/2010    Hypertension     Other ill-defined conditions     CLULITIS BILATERAL  FEET    Other ill-defined conditions     BILATERAL GLAUCOMA     Past Surgical History:   Procedure Laterality Date    HX CATARACT REMOVAL Bilateral     HX GI      COLOONSCOPY    HX ORTHOPAEDIC  2012    LOWER BACK SURGERY    HX OTHER SURGICAL      CYSTS LT ARM FORHEAD AND RT UPPER QUADRANT    HX UVULOPALATOPHARYNGOPLASTY       Current Outpatient Prescriptions   Medication Sig    trimethoprim-sulfamethoxazole (BACTRIM DS, SEPTRA DS) 160-800 mg per tablet Take 1 Tab by mouth.  traMADol (ULTRAM) 50 mg tablet Take 1 Tab by mouth every six (6) hours as needed. Max Daily Amount: 200 mg.  cholecalciferol (VITAMIN D3) 1,000 unit cap Take  by mouth daily.  omeprazole (PRILOSEC) 40 mg capsule TAKE 1 CAPSULE BY MOUTH EVERYDAY    insulin glargine (LANTUS SOLOSTAR) 100 unit/mL (3 mL) pen Inject 20 units daily after breakfast. (Patient taking differently: 10 Units daily (after breakfast). daily after breakfast.)    insulin lispro (HUMALOG) 100 unit/mL kwikpen Inject 3 units before breakfast, lunch, and dinner. Plus sliding scale (Patient taking differently: 3 Units by SubCUTAneous route Before breakfast, lunch, and dinner. IF BS IS  3 UNITS IF LESS NO INSULIN.)    tamsulosin (FLOMAX) 0.4 mg capsule Take 1 Cap by mouth nightly.  lovastatin (MEVACOR) 40 mg tablet TAKE 1 TABLET NIGHTLY    losartan (COZAAR) 50 mg tablet Take 1 Tab by mouth daily.  folic acid (FOLVITE) 1 mg tablet Take 1 Tab by mouth daily.  metFORMIN (GLUCOPHAGE) 1,000 mg tablet Take 1 Tab by mouth two (2) times a day.  glucose blood VI test strips (ASCENSIA CONTOUR) strip Use as directed to test blood sugar 3 times daily - DX-Dm-250.00    aspirin delayed-release 81 mg tablet Take  by mouth daily.  Lancing Device misc To use daily Dx Code E11.65    Lancets misc Contour lancets- test blood sugar 3 times daily    Insulin Needles, Disposable, (INSULIN PEN NEEDLE) 29 x 1/2 \" ndle 1 Package by Does Not Apply route two (2) times a day. No current facility-administered medications for this visit. Review of Systems   Constitutional: Negative. HENT:        He does have swallowing difficulties    Eyes: Negative for pain and redness. Respiratory: Negative. Cardiovascular: Negative for chest pain, palpitations and leg swelling. Gastrointestinal: Negative. Negative for constipation. Genitourinary: Negative. Musculoskeletal: Negative for myalgias. Skin: Negative. Neurological: Negative. Endo/Heme/Allergies: Negative. Psychiatric/Behavioral: Negative for depression and memory loss. The patient does not have insomnia. Physical Exam   Constitutional: He is oriented to person, place, and time. He appears well-developed and well-nourished. HENT:   Head: Normocephalic. Eyes: Conjunctivae and EOM are normal. Pupils are equal, round, and reactive to light. Neck: Normal range of motion. Neck supple. No JVD present. No tracheal deviation present. Thyromegaly present. Cardiovascular: Normal rate, regular rhythm and normal heart sounds. Pulmonary/Chest: Effort normal and breath sounds normal.   Abdominal: Soft. Bowel sounds are normal.   Musculoskeletal: Normal range of motion. Lymphadenopathy:     He has no cervical adenopathy. Neurological: He is alert and oriented to person, place, and time. He has normal reflexes. Skin: Skin is warm.    Psychiatric: none       Lab Results   Component Value Date/Time    Hemoglobin A1c 9.0 08/11/2017 12:04 PM    Hemoglobin A1c 11.0 04/21/2017 02:37 PM    Hemoglobin A1c 9.6 08/04/2015 06:59 AM    Hemoglobin A1c, External 9.4 12/04/2015 11:36 AM    Glucose 177 09/01/2017 03:27 AM    Glucose (POC) 273 09/05/2017 11:16 AM    Microalbumin/Creat ratio (mg/g creat) 40 04/20/2010 01:00 PM    Microalb/Creat ratio (ug/mg creat.) 40.7 04/25/2017 03:06 PM    Microalbumin,urine random 6.54 04/20/2010 01:00 PM    LDL, calculated 41 04/21/2017 02:37 PM    Creatinine 0.71 09/01/2017 03:27 AM      Lab Results   Component Value Date/Time    Cholesterol, total 131 04/21/2017 02:37 PM    HDL Cholesterol 68 04/21/2017 02:37 PM    LDL, calculated 41 04/21/2017 02:37 PM    Triglyceride 109 04/21/2017 02:37 PM    CHOL/HDL Ratio 2.0 04/20/2010 01:00 PM       Lab Results   Component Value Date/Time    ALT (SGPT) 24 04/21/2017 02:37 PM    AST (SGOT) 25 04/21/2017 02:37 PM    Alk. phosphatase 70 04/21/2017 02:37 PM    Bilirubin, direct 0.1 04/20/2010 01:00 PM    Bilirubin, total <0.2 04/21/2017 02:37 PM       Lab Results   Component Value Date/Time    GFR est AA >60 09/01/2017 03:27 AM    GFR est non-AA >60 09/01/2017 03:27 AM    Creatinine 0.71 09/01/2017 03:27 AM    BUN 11 09/01/2017 03:27 AM    Sodium 137 09/01/2017 03:27 AM    Potassium 3.9 09/01/2017 03:27 AM    Chloride 104 09/01/2017 03:27 AM    CO2 22 09/01/2017 03:27 AM            ASSESSMENT and PLAN    1. Type 2 DM poorly controlled : a1c is   9 %     From     Aug 2017     comapred to  8.9 %    From   Today by finger stick   Compared to   11 %    From  April 2017    compared to   9.9 %   From march 2017      Reviewed the glucose log , noticing some improvement   Flucutation is expected with him as he behaves like type 1     safety is first   Explained to wife how important is that he gets insulin by carbs   Gave her a brief idea about the importance     Wife is told to add a unit of humalog if pt eats more carbs per meal   She brought  meal plan that works for her for a week    Next step is to count the carbs - referred to humalog CDE    Continuing   on  Lantus in AM, stop Toujeo   Continuing  on humalog and very easy to follow sliding scale     Patient is advised to check blood sugars 4 times daily by rotation method. reviewed medications and side effects in detail  lab results and schedule of future lab studies reviewed with patient        2. Hypoglycemia :  Educated on treating the hypoglycemia. No inadvertant use of insulin, wife clarifies that he takes insulin whenever     3. HTN : continue cozaar 50 mg .  Patient is educated about importance of compliance with anti-hypertensives especially ARB/ACEI    4. Dyslipidemia : continue Mevacor 40 mg hs . Patient is educated about benefits and adverse effects of statins and explained how benefits outweigh risk. 5. use of aspirin to prevent MI and TIA's discussed      6. Multi nodular goiter :   Thyroid usg - dec 2015 , Left side he has 3 nodules,  1.5 cm dominant nodule and then one mid level 1.2 cm and one 0.6 cm on upper pole   Right side upper pole it is 1.2 cm   He needs a f/u usg       7. Anemia - pcp will follow       8.  Decreased Mental alertness  And suggesting eval to rule out dementia   From the history provided by wife, pt is not reacting to drink or eat at onset of hypoglycemic symptoms   I have emphasized him that he always drives with some body in car   Educated wife that she has to make herself available as he needs guidance    9 s/p spinal stenosis surgery -  Maintenance of blood sugars is importance for good recovery         > 50 % of time is spent on counseling

## 2017-09-14 NOTE — PROGRESS NOTES
Wt Readings from Last 3 Encounters:   09/14/17 136 lb (61.7 kg)   08/31/17 136 lb 3.9 oz (61.8 kg)   08/25/17 136 lb 4.8 oz (61.8 kg)     Temp Readings from Last 3 Encounters:   09/14/17 97.1 °F (36.2 °C) (Oral)   09/05/17 98.2 °F (36.8 °C)   08/25/17 95.7 °F (35.4 °C) (Oral)     BP Readings from Last 3 Encounters:   09/14/17 90/51   09/05/17 104/66   08/25/17 109/59     Pulse Readings from Last 3 Encounters:   09/14/17 72   09/05/17 74   08/25/17 70     Lab Results   Component Value Date/Time    Hemoglobin A1c 9.0 08/11/2017 12:04 PM    Hemoglobin A1c (POC) 8.9 06/07/2017 04:31 PM    Hemoglobin A1c, External 9.4 12/04/2015 11:36 AM

## 2017-09-14 NOTE — MR AVS SNAPSHOT
Visit Information Date & Time Provider Department Dept. Phone Encounter #  
 9/14/2017 11:30 AM Megan Todd MD [facility] [phone] [id number] Follow-up Instructions Return in about 6 weeks (around 10/26/2017). Your Appointments 11/22/2017 11:45 AM  
LAB with CDE NURSE [facility] [address] [place] [address]) Appt Note: labs; r/s labs 100 [facility] [address] [facility] [address]  
[phone]  
  
   
 [address] [place] [id number]  
  
    
 11/28/2017  1:30 PM  
ROUTINE CARE with Megan Todd MD  
[facility] [address] [place] [address]) Appt Note: 3mo fu dm  
 [address] [facility] [address]  
[phone]  
  
   
 [address] [place] [id number] Upcoming Health Maintenance Date Due DTaP/Tdap/Td series (1 - Tdap) 11/13/2011 MEDICARE YEARLY EXAM 3/19/2016 EYE EXAM RETINAL OR DILATED Q1 9/8/2016 FOOT EXAM Q1 11/23/2016 INFLUENZA AGE 9 TO ADULT 8/1/2017 GLAUCOMA SCREENING Q2Y 9/8/2017 HEMOGLOBIN A1C Q6M 2/11/2018 LIPID PANEL Q1 4/21/2018 MICROALBUMIN Q1 4/25/2018 Pneumococcal 65+ Low/Medium Risk (2 of 2 - PPSV23) 11/1/2018 COLONOSCOPY 8/1/2024 Allergies as of 9/14/2017  Review Complete On: 9/14/2017 By: Megan Todd MD  
  
 Severity Noted Reaction Type Reactions Ace Inhibitors  04/20/2010   Side Effect Cough Current Immunizations  Reviewed on 4/15/2011 Name Date Influenza High Dose Vaccine PF 11/16/2015 Influenza Vaccine 10/1/2014, 11/1/2013 Pneumococcal Vaccine (Unspecified Type) 11/1/2013 Td 11/12/2011 Not reviewed this visit You Were Diagnosed With   
  
 Codes Comments Uncontrolled type 2 diabetes mellitus with hyperglycemia, with long-term current use of insulin (HCC)    -  Primary ICD-10-CM: E11.65, Z79.4 ICD-9-CM: 250.02, V58.67 Vitals BP Pulse Temp Resp Height(growth percentile) Weight(growth percentile) 90/51 (BP 1 Location: Right arm, BP Patient Position: Sitting) 72 97.1 °F (36.2 °C) (Oral) 14 5' 7\" (1.702 m) 136 lb (61.7 kg) BMI Smoking Status 21.3 kg/m2 Never Smoker BMI and BSA Data Body Mass Index Body Surface Area  
 21.3 kg/m 2 1.71 m 2 Preferred Pharmacy Pharmacy Name Phone [id number] [address] [facility] [phone] Your Updated Medication List  
  
   
This list is accurate as of: 9/14/17 12:02 PM.  Always use your most recent med list.  
  
  
  
  
 aspirin delayed-release 81 mg tablet Take  by mouth daily. folic acid 1 mg tablet Commonly known as:  Google Take 1 Tab by mouth daily. glucose blood VI test strips strip Commonly known as:  Ascensia CONTOUR Use as directed to test blood sugar 3 times daily - DX-Dm-250.00  
  
 insulin glargine 100 unit/mL (3 mL) Inpn Commonly known as:  Kaity Sill Inject 20 units daily after breakfast.  
  
 insulin lispro 100 unit/mL kwikpen Commonly known as:  HUMALOG Inject 3 units before breakfast, lunch, and dinner. Plus sliding scale Insulin Needles (Disposable) 29 gauge x 1/2\" Ndle Commonly known as:  PEN NEEDLE, DIABETIC  
1 Package by Does Not Apply route two (2) times a day. Lancets Misc Contour lancets- test blood sugar 3 times daily Lancing Device Misc To use daily Dx Code E11.65  
  
 losartan 50 mg tablet Commonly known as:  COZAAR Take 1 Tab by mouth daily. lovastatin 40 mg tablet Commonly known as:  MEVACOR  
TAKE 1 TABLET NIGHTLY  
  
 metFORMIN 1,000 mg tablet Commonly known as:  GLUCOPHAGE Take 1 Tab by mouth two (2) times a day. omeprazole 40 mg capsule Commonly known as:  PRILOSEC  
TAKE 1 CAPSULE BY MOUTH EVERYDAY  
  
 tamsulosin 0.4 mg capsule Commonly known as:  FLOMAX Take 1 Cap by mouth nightly. traMADol 50 mg tablet Commonly known as:  ULTRAM  
Take 1 Tab by mouth every six (6) hours as needed. Max Daily Amount: 200 mg.  
  
 trimethoprim-sulfamethoxazole 160-800 mg per tablet Commonly known as:  BACTRIM DS, SEPTRA DS Take 1 Tab by mouth. VITAMIN D3 1,000 unit Cap Generic drug:  cholecalciferol Take  by mouth daily. Follow-up Instructions Return in about 6 weeks (around 10/26/2017). Patient Instructions Stay on metformin 1000 mg twice a day with meals Check blood sugars immediately before each meal and at bedtime LANTUS  insulin  10   units  After  b-fast  
 
Take Humalog  insulin 3  units before breakfast, 3 units before lunch and 3 units before dinner  When sugars are between 90 to 150 mg Pt wife to bring in a meal plan made by her from home ( to get the count of carbs and consistencies ) 45 gm per meal  
 
 
Take  humalog  as follows with meals  If blood sugars are[de-identified] 
 
150-200 mg 4 units 201-250 mg 5 units 251-300 mg 6 units 301-350 mg 7 units 351-400 mg 8 units 401-450 mg 9 units 451-500 mg 10 units Less than 90 mg NO INSULIN 
----------------------------------------------------------------------------------------- Introducing [facility]! [facility] introduces Exploredge patient portal. Now you can access parts of your medical record, email your doctor's office, and request medication refills online. 1. In your internet browser, go to [email or web address] 2. Click on the First Time User? Click Here link in the Sign In box. You will see the New Member Sign Up page. 3. Enter your Exploredge Access Code exactly as it appears below. You will not need to use this code after youve completed the sign-up process. If you do not sign up before the expiration date, you must request a new code. · As It Is Access Code: Diane Service Expires: 11/9/2017  9:51 AM 
 
4. Enter the last four digits of your Social Security Number (xxxx) and Date of Birth (mm/dd/yyyy) as indicated and click Submit. You will be taken to the next sign-up page. 5. Create a As It Is ID. This will be your As It Is login ID and cannot be changed, so think of one that is secure and easy to remember. 6. Create a As It Is password. You can change your password at any time. 7. Enter your Password Reset Question and Answer. This can be used at a later time if you forget your password. 8. Enter your e-mail address. You will receive e-mail notification when new information is available in [address] 9. Click Sign Up. You can now view and download portions of your medical record. 10. Click the Download Summary menu link to download a portable copy of your medical information. If you have questions, please visit the Frequently Asked Questions section of the As It Is website. Remember, As It Is is NOT to be used for urgent needs. For medical emergencies, dial 911. Now available from your iPhone and Android! Please provide this summary of care documentation to your next provider. Your primary care clinician is listed as Arianna Nguyen. If you have any questions after today's visit, please call [phone].

## 2017-11-22 ENCOUNTER — HOSPITAL ENCOUNTER (OUTPATIENT)
Dept: LAB | Age: 77
Discharge: HOME | End: 2017-11-22
Payer: MEDICARE

## 2017-11-22 PROCEDURE — 36415 COLL VENOUS BLD VENIPUNCTURE: CPT

## 2017-11-22 PROCEDURE — 82043 UR ALBUMIN QUANTITATIVE: CPT

## 2017-11-22 PROCEDURE — 80061 LIPID PANEL: CPT

## 2017-11-22 PROCEDURE — 84443 ASSAY THYROID STIM HORMONE: CPT

## 2017-11-22 PROCEDURE — 83036 HEMOGLOBIN GLYCOSYLATED A1C: CPT

## 2017-11-22 PROCEDURE — 80053 COMPREHEN METABOLIC PANEL: CPT

## 2017-11-28 ENCOUNTER — OFFICE VISIT (OUTPATIENT)
Dept: ENDOCRINOLOGY | Age: 77
End: 2017-11-28

## 2017-11-28 VITALS
OXYGEN SATURATION: 95 % | RESPIRATION RATE: 16 BRPM | HEART RATE: 72 BPM | SYSTOLIC BLOOD PRESSURE: 142 MMHG | HEIGHT: 67 IN | TEMPERATURE: 95.4 F | DIASTOLIC BLOOD PRESSURE: 64 MMHG | BODY MASS INDEX: 21.09 KG/M2 | WEIGHT: 134.4 LBS

## 2017-11-28 DIAGNOSIS — E04.2 MULTINODULAR GOITER: ICD-10-CM

## 2017-11-28 DIAGNOSIS — Z79.4 UNCONTROLLED TYPE 2 DIABETES MELLITUS WITH HYPERGLYCEMIA, WITH LONG-TERM CURRENT USE OF INSULIN (HCC): Primary | ICD-10-CM

## 2017-11-28 DIAGNOSIS — I10 ESSENTIAL HYPERTENSION: ICD-10-CM

## 2017-11-28 DIAGNOSIS — E11.65 UNCONTROLLED TYPE 2 DIABETES MELLITUS WITH HYPERGLYCEMIA, WITH LONG-TERM CURRENT USE OF INSULIN (HCC): Primary | ICD-10-CM

## 2017-11-28 DIAGNOSIS — E78.2 MIXED HYPERLIPIDEMIA: ICD-10-CM

## 2017-11-28 RX ORDER — FINASTERIDE 5 MG/1
TABLET, FILM COATED ORAL
Refills: 99 | COMMUNITY
Start: 2017-11-07

## 2017-11-28 RX ORDER — DONEPEZIL HYDROCHLORIDE 5 MG/1
TABLET, FILM COATED ORAL
Refills: 0 | COMMUNITY
Start: 2017-11-14

## 2017-11-28 RX ORDER — INSULIN GLARGINE 100 [IU]/ML
INJECTION, SOLUTION SUBCUTANEOUS
Qty: 15 ML | Refills: 6 | Status: SHIPPED | OUTPATIENT
Start: 2017-11-28

## 2017-11-28 RX ORDER — INSULIN LISPRO 100 [IU]/ML
INJECTION, SOLUTION INTRAVENOUS; SUBCUTANEOUS
Qty: 15 ML | Refills: 6 | Status: SHIPPED | OUTPATIENT
Start: 2017-11-28 | End: 2018-01-01 | Stop reason: SDUPTHER

## 2017-11-28 NOTE — PROGRESS NOTES
HISTORY OF PRESENT ILLNESS  Reuben Patient is a 78 y.o. male. HPI   f/u visit after last visit for DM  2  Management   From Sept 2017     accompanied by wife   S/p surgery for spinal stenosis - aug 31 st 2017     Had very fluctuant sugars   Some high sugars from eating snacks in between meals     no low blood sugar   He is here with log , food as well as glucose       Wife is helpful in providing the info           Prior history   Referred : by self    H/o diabetes for several  years   Accompanied by wife who is providing the history     Pt in general has drowsiness,   Per WIFE   He was given elavil 50 mg the night before the hospitalization  Current A1C is 9.9 % and symptoms/problems include polyuria, polydipsia and visual disturbances     Current diabetic medications include metformin. When discharged from hospital  On feb 16 2017  , he was told to take only Metformin 1 tab bid   He was told to not to take Toujeo  And jardiance     Current monitoring regimen: home blood tests - 3-4 times a day   Home blood sugar records: trend: fluctuating a lot  Any episodes of hypoglycemia?  yes - multiple soon after Toujeo is taken   He has taken 40 units     Weight trend: stable  Prior visit with dietician: no  Current diet: \"unhealthy\" diet in general  Current exercise: no regular exercise    Known diabetic complications: peripheral neuropathy and cerebrovascular disease  Cardiovascular risk factors: dyslipidemia, diabetes mellitus, obesity, male gender, hypertension, stress    Eye exam current (within one year): yes  LUCIANA: no     Past Medical History:   Diagnosis Date    Cataract     left eye cataract surg    Chronic pain     copd     error    Diabetes ([address]     Hammer toe     HLD (hyperlipidemia) 4/22/2010    Hypertension     Other ill-defined conditions(799.89)     CLULITIS BILATERAL  FEET    Other ill-defined conditions(799.89)     BILATERAL GLAUCOMA     Past Surgical History:   Procedure Laterality Date    HX CATARACT REMOVAL Bilateral     HX GI      COLOONSCOPY    HX ORTHOPAEDIC  2012    LOWER BACK SURGERY    HX OTHER SURGICAL      CYSTS LT ARM FORHEAD AND RT UPPER QUADRANT    HX UVULOPALATOPHARYNGOPLASTY       Current Outpatient Prescriptions   Medication Sig    finasteride (PROSCAR) 5 mg tablet TAKE 1 TABLET BY MOUTH DAILY    donepezil (ARICEPT) 5 mg tablet TAKE 1 TABLET EVERY DAY    Insulin Needles, Disposable, (GILLIAN PEN NEEDLE) 32 gauge x 5/32\" ndle Use four times daily Dx Code E11.65    traMADol (ULTRAM) 50 mg tablet Take 1 Tab by mouth every six (6) hours as needed. Max Daily Amount: 200 mg.  cholecalciferol (VITAMIN D3) 1,000 unit cap Take  by mouth daily.  aspirin delayed-release 81 mg tablet Take  by mouth daily.  omeprazole (PRILOSEC) 40 mg capsule TAKE 1 CAPSULE BY MOUTH EVERYDAY    insulin glargine (LANTUS SOLOSTAR) 100 unit/mL (3 mL) pen Inject 20 units daily after breakfast. (Patient taking differently: 10 Units daily (after breakfast). daily after breakfast.)    insulin lispro (HUMALOG) 100 unit/mL kwikpen Inject 3 units before breakfast, lunch, and dinner. Plus sliding scale (Patient taking differently: 3 Units by SubCUTAneous route Before breakfast, lunch, and dinner. IF BS IS  3 UNITS IF LESS NO INSULIN.)    Lancing Device misc To use daily Dx Code E11.65    tamsulosin (FLOMAX) 0.4 mg capsule Take 1 Cap by mouth nightly.  lovastatin (MEVACOR) 40 mg tablet TAKE 1 TABLET NIGHTLY    losartan (COZAAR) 50 mg tablet Take 1 Tab by mouth daily.  folic acid (FOLVITE) 1 mg tablet Take 1 Tab by mouth daily.  metFORMIN (GLUCOPHAGE) 1,000 mg tablet Take 1 Tab by mouth two (2) times a day.     glucose blood VI test strips (ASCENSIA CONTOUR) strip Use as directed to test blood sugar 3 times daily - DX-Dm-250.00    Lancets misc Contour lancets- test blood sugar 3 times daily    Insulin Needles, Disposable, (INSULIN PEN NEEDLE) 29 x 1/2 \" ndle 1 Package by Does Not Apply route two (2) times a day. No current facility-administered medications for this visit. Review of Systems   Constitutional: Negative. HENT:        He does have swallowing difficulties    Eyes: Negative for pain and redness. Respiratory: Negative. Cardiovascular: Negative for chest pain, palpitations and leg swelling. Gastrointestinal: Negative. Negative for constipation. Genitourinary: Negative. Musculoskeletal: Negative for myalgias. Skin: Negative. Neurological: Negative. Endo/Heme/Allergies: Negative. Psychiatric/Behavioral: Negative for depression and memory loss. The patient does not have insomnia. Physical Exam   Constitutional: He is oriented to person, place, and time. He appears well-developed and well-nourished. HENT:   Head: Normocephalic. Eyes: Conjunctivae and EOM are normal. Pupils are equal, round, and reactive to light. Neck: Normal range of motion. Neck supple. No JVD present. No tracheal deviation present. Thyromegaly present. Cardiovascular: Normal rate, regular rhythm and normal heart sounds. Pulmonary/Chest: Effort normal and breath sounds normal.   Abdominal: Soft. Bowel sounds are normal.   Musculoskeletal: Normal range of motion. Lymphadenopathy:     He has no cervical adenopathy. Neurological: He is alert and oriented to person, place, and time. He has normal reflexes. Skin: Skin is warm.    Psychiatric: none       Lab Results   Component Value Date/Time    Hemoglobin A1c 8.9 11/22/2017 11:48 AM    Hemoglobin A1c 9.0 08/11/2017 12:04 PM    Hemoglobin A1c 11.0 04/21/2017 02:37 PM    Hemoglobin A1c, External 9.4 12/04/2015 11:36 AM    Glucose 310 11/22/2017 11:48 AM    Glucose (POC) 273 09/05/2017 11:16 AM    Microalbumin/Creat ratio (mg/g creat) 40 04/20/2010 01:00 PM    Microalb/Creat ratio (ug/mg creat.) 271.8 11/22/2017 11:48 AM    Microalbumin,urine random 6.54 04/20/2010 01:00 PM    LDL, calculated 54 11/22/2017 11:48 AM Creatinine 0.88 11/22/2017 11:48 AM      Lab Results   Component Value Date/Time    Cholesterol, total 151 11/22/2017 11:48 AM    HDL Cholesterol 78 11/22/2017 11:48 AM    LDL, calculated 54 11/22/2017 11:48 AM    Triglyceride 95 11/22/2017 11:48 AM    CHOL/HDL Ratio 2.0 04/20/2010 01:00 PM       Lab Results   Component Value Date/Time    ALT (SGPT) 7 11/22/2017 11:48 AM    AST (SGOT) 16 11/22/2017 11:48 AM    Alk. phosphatase 97 11/22/2017 11:48 AM    Bilirubin, direct 0.1 04/20/2010 01:00 PM    Bilirubin, total 0.2 11/22/2017 11:48 AM       Lab Results   Component Value Date/Time    GFR est AA 97 11/22/2017 11:48 AM    GFR est non-AA 84 11/22/2017 11:48 AM    Creatinine 0.88 11/22/2017 11:48 AM    BUN 17 11/22/2017 11:48 AM    Sodium 134 11/22/2017 11:48 AM    Potassium 4.9 11/22/2017 11:48 AM    Chloride 94 11/22/2017 11:48 AM    CO2 25 11/22/2017 11:48 AM            ASSESSMENT and PLAN    1. Type 2 DM poorly controlled : a1c is   8.9 %     From nov 2017   Compared to   9 %     From     Aug 2017     comapred to  8.9 %    From   Today by finger stick   Compared to   11 %    From  April 2017    compared to   9.9 %   From march 2017      Reviewed the glucose log , noticing some improvement   Flucutation is expected with him as he behaves like type 1     safety is first   Explained to wife how important is that he gets insulin by carbs   Gave her a brief idea about the importance     Wife is told to add a unit of humalog if pt eats more carbs per meal   She brought  meal plan that works for her for a week    Next step is to count the carbs - referred to humalog CDE    Continuing   on  Lantus in AM, stop Toujeo   Continuing  on humalog and very easy to follow sliding scale     Patient is advised to check blood sugars 4 times daily by rotation method. reviewed medications and side effects in detail  lab results and schedule of future lab studies reviewed with patient        2.  Hypoglycemia :  Educated on treating the hypoglycemia. No inadvertant use of insulin, wife clarifies that he takes insulin whenever     3. HTN : continue cozaar 50 mg . Patient is educated about importance of compliance with anti-hypertensives especially ARB/ACEI    4. Dyslipidemia : continue Mevacor 40 mg hs . Patient is educated about benefits and adverse effects of statins and explained how benefits outweigh risk. 5. use of aspirin to prevent MI and TIA's discussed      6. Multi nodular goiter : Euthyroid   Thyroid usg - dec 2015 , Left side he has 3 nodules,  1.5 cm dominant nodule and then one mid level 1.2 cm and one 0.6 cm on upper pole   Right side upper pole it is 1.2 cm   He needs a f/u usg       7. Anemia - pcp will follow       8. Decreased Mental alertness  And suggesting eval to rule out dementia : f/u with neurologist   F/u with Connie Pump    9 s/p spinal stenosis surgery -  Maintenance of blood sugars is importance for good recovery       10. Bladder issues - on catheter   f/u with urologist       11. Dysphagia occasionally       12.  Sleep apnoea to be evaluated        > 50 % of time is spent on counseling

## 2017-11-28 NOTE — PROGRESS NOTES
Chief Complaint   Patient presents with    Diabetes     1. Have you been to the ER, urgent care clinic since your last visit? Hospitalized since your last visit? No    2. Have you seen or consulted any other health care providers outside of the [address] [place] [address] since your last visit? Include any pap smears or colon screening.  No     Wt Readings from Last 3 Encounters:   11/28/17 134 lb 6.4 oz (61 kg)   09/14/17 136 lb (61.7 kg)   08/31/17 136 lb 3.9 oz (61.8 kg)     Temp Readings from Last 3 Encounters:   11/28/17 95.4 °F (35.2 °C) (Oral)   09/14/17 97.1 °F (36.2 °C) (Oral)   09/05/17 98.2 °F (36.8 °C)     BP Readings from Last 3 Encounters:   11/28/17 142/64   09/14/17 90/51   09/05/17 104/66     Pulse Readings from Last 3 Encounters:   11/28/17 72   09/14/17 72   09/05/17 74     Pt has meter  Pt had a foot and eye exam

## 2017-11-28 NOTE — MR AVS SNAPSHOT
Visit Information Date & Time Provider Department Dept. Phone Encounter #  
 11/28/2017  1:30 PM Ovi Garcia MD [facility] [phone] [id number] Follow-up Instructions Return in about 4 months (around 3/28/2018). Upcoming Health Maintenance Date Due DTaP/Tdap/Td series (1 - Tdap) 11/13/2011 MEDICARE YEARLY EXAM 3/19/2016 EYE EXAM RETINAL OR DILATED Q1 9/8/2016 FOOT EXAM Q1 11/23/2016 Influenza Age 5 to Adult 8/1/2017 GLAUCOMA SCREENING Q2Y 9/8/2017 HEMOGLOBIN A1C Q6M 5/22/2018 Pneumococcal 65+ Low/Medium Risk (2 of 2 - PPSV23) 11/1/2018 MICROALBUMIN Q1 11/22/2018 LIPID PANEL Q1 11/22/2018 COLONOSCOPY 8/1/2024 Allergies as of 11/28/2017  Review Complete On: 11/28/2017 By: Ovi Garcia MD  
  
 Severity Noted Reaction Type Reactions Ace Inhibitors  04/20/2010   Side Effect Cough Current Immunizations  Reviewed on 4/15/2011 Name Date Influenza High Dose Vaccine PF 11/16/2015 Influenza Vaccine 10/1/2014, 11/1/2013 Pneumococcal Vaccine (Unspecified Type) 11/1/2013 Td 11/12/2011 Not reviewed this visit You Were Diagnosed With   
  
 Codes Comments Uncontrolled type 2 diabetes mellitus with hyperglycemia, with long-term current use of insulin (HCC)    -  Primary ICD-10-CM: E11.65, Z79.4 ICD-9-CM: 250.02, V58.67 Multinodular goiter     ICD-10-CM: E04.2 ICD-9-CM: 424. 1 Vitals BP Pulse Temp Resp Height(growth percentile) Weight(growth percentile) 142/64 (BP 1 Location: Right arm, BP Patient Position: Sitting) 72 95.4 °F (35.2 °C) (Oral) 16 5' 7\" (1.702 m) 134 lb 6.4 oz (61 kg) SpO2 BMI Smoking Status 95% 21.05 kg/m2 Never Smoker BMI and BSA Data Body Mass Index Body Surface Area 21.05 kg/m 2 1.7 m 2 Preferred Pharmacy Pharmacy Name Phone  [id number]- [place], [place] - [address] [facility] [address] [phone] Your Updated Medication List  
  
   
This list is accurate as of: 11/28/17  2:18 PM.  Always use your most recent med list.  
  
  
  
  
 aspirin delayed-release 81 mg tablet Take  by mouth daily. donepezil 5 mg tablet Commonly known as:  ARICEPT  
TAKE 1 TABLET EVERY DAY  
  
 finasteride 5 mg tablet Commonly known as:  PROSCAR  
TAKE 1 TABLET BY MOUTH DAILY  
  
 folic acid 1 mg tablet Commonly known as:  Google Take 1 Tab by mouth daily. glucose blood VI test strips strip Commonly known as:  Ascensia CONTOUR Use as directed to test blood sugar 3 times daily - DX-Dm-250.00  
  
 insulin glargine 100 unit/mL (3 mL) Inpn Commonly known as:  Yasemin Bucco Inject 20 units daily after breakfast.  
  
 insulin lispro 100 unit/mL kwikpen Commonly known as:  HUMALOG Inject 3 units before breakfast, lunch, and dinner. Plus sliding scale Insulin Needles (Disposable) 29 gauge x 1/2\" Ndle Commonly known as:  PEN NEEDLE, DIABETIC  
1 Package by Does Not Apply route two (2) times a day. Insulin Needles (Disposable) 32 gauge x 5/32\" Ndle Commonly known as:  Addis Pen Needle Use four times daily Dx Code E11.65 Lancets Misc Contour lancets- test blood sugar 3 times daily Lancing Device Misc To use daily Dx Code E11.65  
  
 losartan 50 mg tablet Commonly known as:  COZAAR Take 1 Tab by mouth daily. lovastatin 40 mg tablet Commonly known as:  MEVACOR  
TAKE 1 TABLET NIGHTLY  
  
 metFORMIN 1,000 mg tablet Commonly known as:  GLUCOPHAGE Take 1 Tab by mouth two (2) times a day. omeprazole 40 mg capsule Commonly known as:  PRILOSEC  
TAKE 1 CAPSULE BY MOUTH EVERYDAY  
  
 tamsulosin 0.4 mg capsule Commonly known as:  FLOMAX Take 1 Cap by mouth nightly. traMADol 50 mg tablet Commonly known as:  ULTRAM  
Take 1 Tab by mouth every six (6) hours as needed. Max Daily Amount: 200 mg. VITAMIN D3 1,000 unit Cap Generic drug:  cholecalciferol Take  by mouth daily. Follow-up Instructions Return in about 4 months (around 3/28/2018). To-Do List   
 11/28/2017 Imaging:  US THYROID/PARATHYROID/SOFT TISS Patient Instructions 45 gm of carbs per day Stay on metformin 1000 mg twice a day with meals Check blood sugars immediately before each meal and at bedtime 
 
 
 increase  LANTUS  insulin  12   units  After  b-fast  
 
Increased  Humalog  insulin  To 5    units before breakfast, 3 units before lunch and 3 units before dinner  When sugars are between 90 to 150 mg Pt wife to bring in a meal plan made by her from home ( to get the count of carbs and consistencies ) 45 gm per meal  
 
 
Add   humalog  as follows with meals  If blood sugars are[de-identified] 
 
150-200 mg 1 units 201-250 mg 2 units 251-300 mg 3 units 301-350 mg 4 units 351-400 mg 5 units 401-450 mg 6 units 451-500 mg 7 units Less than 90 mg NO INSULIN 
----------------------------------------------------------------------------------------- Introducing [facility]! Damian Reyna introduces [employer] patient portal. Now you can access parts of your medical record, email your doctor's office, and request medication refills online. 1. In your internet browser, go to [email or web address] 2. Click on the First Time User? Click Here link in the Sign In box. You will see the New Member Sign Up page. 3. Enter your [employer] Access Code exactly as it appears below. You will not need to use this code after youve completed the sign-up process. If you do not sign up before the expiration date, you must request a new code. · [employer] Access Code: [email or web address] Expires: 2/26/2018  2:11 PM 
 
4. Enter the last four digits of your Social Security Number (xxxx) and Date of Birth (mm/dd/yyyy) as indicated and click Submit.  You will be taken to the next sign-up page. 5. Create a [employer] ID. This will be your [employer] login ID and cannot be changed, so think of one that is secure and easy to remember. 6. Create a [employer]Jam password. You can change your password at any time. 7. Enter your Password Reset Question and Answer. This can be used at a later time if you forget your password. 8. Enter your e-mail address. You will receive e-mail notification when new information is available in [address] 9. Click Sign Up. You can now view and download portions of your medical record. 10. Click the Download Summary menu link to download a portable copy of your medical information. If you have questions, please visit the Frequently Asked Questions section of the [employer] website. Remember, [employer] is NOT to be used for urgent needs. For medical emergencies, dial 911. Now available from your iPhone and Android! Please provide this summary of care documentation to your next provider. Your primary care clinician is listed as Maryse Sahni. If you have any questions after today's visit, please call [phone].

## 2017-11-28 NOTE — PATIENT INSTRUCTIONS
45 gm of carbs per day       Stay on metformin 1000 mg twice a day with meals     Check blood sugars immediately before each meal and at bedtime       increase  LANTUS  insulin  12   units  After  b-fast     Increased  Humalog  insulin  To 5    units before breakfast, 3 units before lunch and 3 units before dinner  When sugars are between 90 to 150 mg   Pt wife to bring in a meal plan made by her from home ( to get the count of carbs and consistencies )      45 gm per meal       Add   humalog  as follows with meals  If blood sugars are[de-identified]    150-200 mg 1 units    201-250 mg 2 units    251-300 mg 3 units    301-350 mg 4 units    351-400 mg 5 units    401-450 mg 6 units    451-500 mg 7 units     Less than 90 mg NO INSULIN  -----------------------------------------------------------------------------------------

## 2017-11-29 ENCOUNTER — OP HISTORICAL/CONVERTED ENCOUNTER (OUTPATIENT)
Dept: OTHER | Age: 77
End: 2017-11-29

## 2018-01-01 ENCOUNTER — OP HISTORICAL/CONVERTED ENCOUNTER (OUTPATIENT)
Dept: OTHER | Age: 78
End: 2018-01-01

## 2018-01-01 ENCOUNTER — HOSPITAL ENCOUNTER (OUTPATIENT)
Dept: LAB | Age: 78
Discharge: HOME | End: 2018-03-22
Payer: MEDICARE

## 2018-01-01 ENCOUNTER — OFFICE VISIT (OUTPATIENT)
Dept: ENDOCRINOLOGY | Age: 78
End: 2018-01-01

## 2018-01-01 ENCOUNTER — HOSPITAL ENCOUNTER (OUTPATIENT)
Dept: LAB | Age: 78
Discharge: HOME | End: 2018-03-28
Payer: MEDICARE

## 2018-01-01 ENCOUNTER — DOCUMENTATION ONLY (OUTPATIENT)
Dept: ENDOCRINOLOGY | Age: 78
End: 2018-01-01

## 2018-01-01 ENCOUNTER — ED HISTORICAL/CONVERTED ENCOUNTER (OUTPATIENT)
Dept: OTHER | Age: 78
End: 2018-01-01

## 2018-01-01 ENCOUNTER — TELEPHONE (OUTPATIENT)
Dept: ENDOCRINOLOGY | Age: 78
End: 2018-01-01

## 2018-01-01 ENCOUNTER — IP HISTORICAL/CONVERTED ENCOUNTER (OUTPATIENT)
Dept: OTHER | Age: 78
End: 2018-01-01

## 2018-01-01 ENCOUNTER — HOSPITAL ENCOUNTER (OUTPATIENT)
Dept: LAB | Age: 78
Discharge: HOME | End: 2018-08-23
Payer: MEDICARE

## 2018-01-01 VITALS
DIASTOLIC BLOOD PRESSURE: 66 MMHG | WEIGHT: 133 LBS | HEART RATE: 66 BPM | BODY MASS INDEX: 20.88 KG/M2 | RESPIRATION RATE: 14 BRPM | HEIGHT: 67 IN | SYSTOLIC BLOOD PRESSURE: 112 MMHG | TEMPERATURE: 97 F

## 2018-01-01 VITALS
WEIGHT: 126.8 LBS | DIASTOLIC BLOOD PRESSURE: 81 MMHG | HEART RATE: 78 BPM | HEIGHT: 67 IN | OXYGEN SATURATION: 98 % | BODY MASS INDEX: 19.9 KG/M2 | RESPIRATION RATE: 18 BRPM | TEMPERATURE: 95.4 F | SYSTOLIC BLOOD PRESSURE: 179 MMHG

## 2018-01-01 VITALS
RESPIRATION RATE: 18 BRPM | HEIGHT: 67 IN | BODY MASS INDEX: 21.16 KG/M2 | SYSTOLIC BLOOD PRESSURE: 135 MMHG | DIASTOLIC BLOOD PRESSURE: 64 MMHG | TEMPERATURE: 98 F | HEART RATE: 62 BPM | OXYGEN SATURATION: 97 % | WEIGHT: 134.8 LBS

## 2018-01-01 VITALS
TEMPERATURE: 97.6 F | WEIGHT: 133.3 LBS | OXYGEN SATURATION: 96 % | SYSTOLIC BLOOD PRESSURE: 106 MMHG | HEIGHT: 67 IN | HEART RATE: 64 BPM | BODY MASS INDEX: 20.92 KG/M2 | RESPIRATION RATE: 18 BRPM | DIASTOLIC BLOOD PRESSURE: 59 MMHG

## 2018-01-01 DIAGNOSIS — I10 ESSENTIAL HYPERTENSION: ICD-10-CM

## 2018-01-01 DIAGNOSIS — E04.9 GOITER: ICD-10-CM

## 2018-01-01 DIAGNOSIS — Z79.4 UNCONTROLLED TYPE 2 DIABETES MELLITUS WITH HYPERGLYCEMIA, WITH LONG-TERM CURRENT USE OF INSULIN (HCC): Primary | ICD-10-CM

## 2018-01-01 DIAGNOSIS — Z79.4 TYPE 2 DIABETES MELLITUS WITH HYPERGLYCEMIA, WITH LONG-TERM CURRENT USE OF INSULIN (HCC): Primary | ICD-10-CM

## 2018-01-01 DIAGNOSIS — I82.409 ACUTE DEEP VEIN THROMBOSIS (DVT) OF OTHER VEIN OF LOWER EXTREMITY, UNSPECIFIED LATERALITY (HCC): ICD-10-CM

## 2018-01-01 DIAGNOSIS — E78.2 MIXED HYPERLIPIDEMIA: ICD-10-CM

## 2018-01-01 DIAGNOSIS — E11.65 UNCONTROLLED TYPE 2 DIABETES MELLITUS WITH HYPERGLYCEMIA, WITH LONG-TERM CURRENT USE OF INSULIN (HCC): Primary | ICD-10-CM

## 2018-01-01 DIAGNOSIS — E04.2 MULTINODULAR GOITER: ICD-10-CM

## 2018-01-01 DIAGNOSIS — E11.65 TYPE 2 DIABETES MELLITUS WITH HYPERGLYCEMIA, WITH LONG-TERM CURRENT USE OF INSULIN (HCC): Primary | ICD-10-CM

## 2018-01-01 DIAGNOSIS — N13.8 BENIGN PROSTATIC HYPERPLASIA WITH URINARY OBSTRUCTION: ICD-10-CM

## 2018-01-01 DIAGNOSIS — N40.1 BENIGN PROSTATIC HYPERPLASIA WITH URINARY OBSTRUCTION: ICD-10-CM

## 2018-01-01 DIAGNOSIS — F03.90 DEMENTIA WITHOUT BEHAVIORAL DISTURBANCE, UNSPECIFIED DEMENTIA TYPE: ICD-10-CM

## 2018-01-01 PROCEDURE — 84443 ASSAY THYROID STIM HORMONE: CPT

## 2018-01-01 PROCEDURE — 83036 HEMOGLOBIN GLYCOSYLATED A1C: CPT

## 2018-01-01 PROCEDURE — 80061 LIPID PANEL: CPT

## 2018-01-01 PROCEDURE — 82043 UR ALBUMIN QUANTITATIVE: CPT

## 2018-01-01 PROCEDURE — 36415 COLL VENOUS BLD VENIPUNCTURE: CPT

## 2018-01-01 PROCEDURE — 84681 ASSAY OF C-PEPTIDE: CPT

## 2018-01-01 PROCEDURE — 85025 COMPLETE CBC W/AUTO DIFF WBC: CPT

## 2018-01-01 PROCEDURE — 80053 COMPREHEN METABOLIC PANEL: CPT

## 2018-01-01 PROCEDURE — 83516 IMMUNOASSAY NONANTIBODY: CPT

## 2018-01-01 RX ORDER — MEMANTINE HYDROCHLORIDE 10 MG/1
TABLET ORAL
COMMUNITY
End: 2018-01-01 | Stop reason: SDUPTHER

## 2018-01-01 RX ORDER — FUROSEMIDE 40 MG/1
TABLET ORAL
Refills: 1 | COMMUNITY
Start: 2018-01-01

## 2018-01-01 RX ORDER — TIMOLOL MALEATE 5 MG/ML
SOLUTION/ DROPS OPHTHALMIC
COMMUNITY
Start: 2018-01-01

## 2018-01-01 RX ORDER — MEMANTINE HYDROCHLORIDE 10 MG/1
TABLET ORAL
Refills: 0 | COMMUNITY
Start: 2018-03-12

## 2018-01-01 RX ORDER — CEPHALEXIN 250 MG/1
CAPSULE ORAL
COMMUNITY
End: 2018-01-01 | Stop reason: ALTCHOICE

## 2018-01-01 RX ORDER — CIPROFLOXACIN 500 MG/1
TABLET ORAL
COMMUNITY

## 2018-01-01 RX ORDER — INSULIN GLARGINE 100 [IU]/ML
INJECTION, SOLUTION SUBCUTANEOUS
Qty: 15 ML | Refills: 3 | Status: SHIPPED | OUTPATIENT
Start: 2018-01-01 | End: 2018-01-01 | Stop reason: DRUGHIGH

## 2018-01-01 RX ORDER — CEPHALEXIN 500 MG/1
500 CAPSULE ORAL 2 TIMES DAILY
COMMUNITY

## 2018-01-01 RX ORDER — INSULIN LISPRO 100 [IU]/ML
INJECTION, SOLUTION INTRAVENOUS; SUBCUTANEOUS
Qty: 30 ML | Refills: 3 | Status: SHIPPED | OUTPATIENT
Start: 2018-01-01

## 2018-01-10 ENCOUNTER — OFFICE VISIT (OUTPATIENT)
Dept: ENDOCRINOLOGY | Age: 78
End: 2018-01-10

## 2018-01-10 VITALS
WEIGHT: 130 LBS | BODY MASS INDEX: 20.4 KG/M2 | DIASTOLIC BLOOD PRESSURE: 56 MMHG | SYSTOLIC BLOOD PRESSURE: 131 MMHG | RESPIRATION RATE: 16 BRPM | TEMPERATURE: 94.2 F | HEART RATE: 65 BPM | HEIGHT: 67 IN

## 2018-01-10 DIAGNOSIS — E04.9 GOITER: Primary | ICD-10-CM

## 2018-01-10 PROBLEM — E11.21 TYPE 2 DIABETES MELLITUS WITH NEPHROPATHY (HCC): Status: ACTIVE | Noted: 2018-01-10

## 2018-01-10 RX ORDER — DONEPEZIL HYDROCHLORIDE 10 MG/1
TABLET, FILM COATED ORAL
Refills: 0 | COMMUNITY
Start: 2017-12-12

## 2018-01-10 NOTE — PROGRESS NOTES
Chief Complaint   Patient presents with    Ultrasound     1. Have you been to the ER, urgent care clinic since your last visit? Hospitalized since your last visit? No    2. Have you seen or consulted any other health care providers outside of the [address] [place] [address] since your last visit? Include any pap smears or colon screening.  No       Wt Readings from Last 3 Encounters:   01/10/18 130 lb (59 kg)   11/28/17 134 lb 6.4 oz (61 kg)   09/14/17 136 lb (61.7 kg)     Temp Readings from Last 3 Encounters:   01/10/18 (!) 94.2 °F (34.6 °C) (Oral)   11/28/17 95.4 °F (35.2 °C) (Oral)   09/14/17 97.1 °F (36.2 °C) (Oral)     BP Readings from Last 3 Encounters:   01/10/18 131/56   11/28/17 142/64   09/14/17 90/51     Pulse Readings from Last 3 Encounters:   01/10/18 65   11/28/17 72   09/14/17 72     Unable to get o2 due to the hands being extremely cold

## 2018-01-10 NOTE — MR AVS SNAPSHOT
Visit Information Date & Time Provider Department Dept. Phone Encounter #  
 1/10/2018  3:00 PM MD [facility] [phone] [id number] Your Appointments 3/21/2018 11:15 AM  
ROUTINE CARE with MD [facility] [address] [facility]) Appt Note: labs only [address] [address] [facility] [address]  
[phone] [address] [facility] [address]  
  
    
 3/28/2018 11:00 AM  
ROUTINE CARE with MD [facility] [address] [place] [address]) Appt Note: f/u 4 month  
 [address] [facility] [address]  
[phone] Upcoming Health Maintenance Date Due DTaP/Tdap/Td series (1 - Tdap) 11/13/2011 MEDICARE YEARLY EXAM 3/19/2016 EYE EXAM RETINAL OR DILATED Q1 9/8/2016 FOOT EXAM Q1 11/23/2016 Influenza Age 5 to Adult 8/1/2017 GLAUCOMA SCREENING Q2Y 9/8/2017 HEMOGLOBIN A1C Q6M 5/22/2018 Pneumococcal 65+ Low/Medium Risk (2 of 2 - PPSV23) 11/1/2018 MICROALBUMIN Q1 11/22/2018 LIPID PANEL Q1 11/22/2018 COLONOSCOPY 8/1/2024 Allergies as of 1/10/2018  Review Complete On: 1/10/2018 By: [place] Severity Noted Reaction Type Reactions Ace Inhibitors  04/20/2010   Side Effect Cough Current Immunizations  Reviewed on 4/15/2011 Name Date Influenza High Dose Vaccine PF 11/16/2015 Influenza Vaccine 10/1/2014, 11/1/2013 Pneumococcal Vaccine (Unspecified Type) 11/1/2013 Td 11/12/2011 Not reviewed this visit You Were Diagnosed With   
  
 Codes Comments Goiter    -  Primary ICD-10-CM: E04.9 ICD-9-CM: 240.9 Vitals BP Pulse Temp Resp Height(growth percentile) Weight(growth percentile) 131/56 (BP 1 Location: Right arm, BP Patient Position: Sitting) 65 (!) 94.2 °F (34.6 °C) (Oral) 16 5' 7\" (1.702 m) 130 lb (59 kg) BMI Smoking Status 20.36 kg/m2 Never Smoker BMI and BSA Data Body Mass Index Body Surface Area  
 20.36 kg/m 2 1.67 m 2 Preferred Pharmacy Pharmacy Name Phone [facility]/PHARMACY #[id number]- [address] AT [facility] [place] [address] [phone] Your Updated Medication List  
  
   
This list is accurate as of: 1/10/18  3:40 PM.  Always use your most recent med list.  
  
  
  
  
 aspirin delayed-release 81 mg tablet Take  by mouth daily. * donepezil 5 mg tablet Commonly known as:  ARICEPT  
TAKE 1 TABLET EVERY DAY  
  
 * donepezil 10 mg tablet Commonly known as:  ARICEPT  
TAKE 1 TABLET BY MOUTH EVERY DAY  
  
 finasteride 5 mg tablet Commonly known as:  PROSCAR  
TAKE 1 TABLET BY MOUTH DAILY  
  
 folic acid 1 mg tablet Commonly known as:  Google Take 1 Tab by mouth daily. glucose blood VI test strips strip Commonly known as:  Ascensia CONTOUR Use as directed to test blood sugar 3 times daily - DX-Dm-250.00  
  
 insulin glargine 100 unit/mL (3 mL) Inpn Commonly known as:  Nafisa Sharps Decrease to 12 units daily after breakfast.  
  
 insulin lispro 100 unit/mL kwikpen Commonly known as:  HUMALOG Increase to 5 units before breakfast, 3 units before lunch, and 3 units before dinner. Plus sliding scale Insulin Needles (Disposable) 29 gauge x 1/2\" Ndle Commonly known as:  PEN NEEDLE, DIABETIC  
1 Package by Does Not Apply route two (2) times a day. Insulin Needles (Disposable) 32 gauge x 5/32\" Ndle Commonly known as:  Addis Pen Needle Use four times daily Dx Code E11.65 Lancets Misc Contour lancets- test blood sugar 3 times daily Lancing Device Misc To use daily Dx Code E11.65  
  
 losartan 50 mg tablet Commonly known as:  COZAAR Take 1 Tab by mouth daily. lovastatin 40 mg tablet Commonly known as:  MEVACOR  
TAKE 1 TABLET NIGHTLY  
  
 metFORMIN 1,000 mg tablet Commonly known as:  GLUCOPHAGE Take 1 Tab by mouth two (2) times a day. omeprazole 40 mg capsule Commonly known as:  PRILOSEC  
TAKE 1 CAPSULE BY MOUTH EVERYDAY  
  
 tamsulosin 0.4 mg capsule Commonly known as:  FLOMAX Take 1 Cap by mouth nightly. traMADol 50 mg tablet Commonly known as:  ULTRAM  
Take 1 Tab by mouth every six (6) hours as needed. Max Daily Amount: 200 mg. VITAMIN D3 1,000 unit Cap Generic drug:  cholecalciferol Take  by mouth daily. * Notice: This list has 2 medication(s) that are the same as other medications prescribed for you. Read the directions carefully, and ask your doctor or other care provider to review them with you. To-Do List   
 01/10/2018 Imaging:  CT NECK SOFT TISSUE WO CONT Introducing \[facility]\"" & HEALTH SERVICES! [facility] introduces Traity patient portal. Now you can access parts of your medical record, email your doctor's office, and request medication refills online. 1. In your internet browser, go to [email or web address] 2. Click on the First Time User? Click Here link in the Sign In box. You will see the New Member Sign Up page. 3. Enter your Traity Access Code exactly as it appears below. You will not need to use this code after youve completed the sign-up process. If you do not sign up before the expiration date, you must request a new code. · Traity Access Code: [email or web address] Expires: 2/26/2018  2:11 PM 
 
4. Enter the last four digits of your Social Security Number (xxxx) and Date of Birth (mm/dd/yyyy) as indicated and click Submit. You will be taken to the next sign-up page. 5. Create a Traity ID. This will be your Traity login ID and cannot be changed, so think of one that is secure and easy to remember. 6. Create a Traity password. You can change your password at any time. 7. Enter your Password Reset Question and Answer.  This can be used at a later time if you forget your password. 8. Enter your e-mail address. You will receive e-mail notification when new information is available in [address] 9. Click Sign Up. You can now view and download portions of your medical record. 10. Click the Download Summary menu link to download a portable copy of your medical information. If you have questions, please visit the Frequently Asked Questions section of the Wish Days website. Remember, Wish Days is NOT to be used for urgent needs. For medical emergencies, dial 911. Now available from your iPhone and Android! Please provide this summary of care documentation to your next provider. Your primary care clinician is listed as Lea Hodges. If you have any questions after today's visit, please call [phone].

## 2018-01-20 NOTE — PROGRESS NOTES
Diagnostic thyroid Ultrasound   Date : 1/10/2018    Real time usg of thyroid gland was performed in both transverse and sagittal planes    Right thyroid lobe: heterogenous, measuring 1.46 cm by 3 cm by 1.54 cm  Left thyroid lobe :  Heterogenous, Measuring 1.71cm by 1.26 cm  By 3 cm  Isthmus: 2 mm    Unable to clearly distinguish his thyroid lobe characters, as the neck space is narrow and SCM are prominent     Referred pt to get it done out side

## 2018-03-28 NOTE — PATIENT INSTRUCTIONS
45 gm of carbs per day       Stay on metformin 1000 mg twice a day with meals     Check blood sugars immediately before each meal and at bedtime        LANTUS  insulin  12   units  After  b-fast      Humalog  insulin  To 5    units before breakfast, 3 units before lunch and 3 units before dinner  When sugars are between 90 to 150 mg   Pt wife to bring in a meal plan made by her from home ( to get the count of carbs and consistencies )      45 gm per meal       Add   humalog  as follows with meals  If blood sugars are[de-identified]    150-200 mg 1 units    201-250 mg 2 units    251-300 mg 3 units    301-350 mg 4 units    351-400 mg 5 units    401-450 mg 6 units    451-500 mg 7 units     Less than 90 mg NO INSULIN  -----------------------------------------------------------------------------------------

## 2018-03-28 NOTE — PROGRESS NOTES
Wt Readings from Last 3 Encounters:   03/28/18 134 lb 12.8 oz (61.1 kg)   01/10/18 130 lb (59 kg)   11/28/17 134 lb 6.4 oz (61 kg)     Temp Readings from Last 3 Encounters:   03/28/18 98 °F (36.7 °C) (Oral)   01/10/18 (!) 94.2 °F (34.6 °C) (Oral)   11/28/17 95.4 °F (35.2 °C) (Oral)     BP Readings from Last 3 Encounters:   03/28/18 135/64   01/10/18 131/56   11/28/17 142/64     Pulse Readings from Last 3 Encounters:   03/28/18 62   01/10/18 65   11/28/17 72     Lab Results   Component Value Date/Time    Hemoglobin A1c 10.2 (H) 03/22/2018 01:37 PM    Hemoglobin A1c (POC) 8.9 06/07/2017 04:31 PM    Hemoglobin A1c, External 9.4 12/04/2015 11:36 AM

## 2018-03-28 NOTE — MR AVS SNAPSHOT
[address] [facility] [address] 
[phone] Patient: Yuri Hoffmann MRN: [id number] RDS:[birth year] Visit Information Date & Time Provider Department Dept. Phone Encounter #  
 3/28/2018 11:00 AM Diaz Salinas MD [facility] [phone] [id number] Follow-up Instructions Return in about 6 weeks (around 5/9/2018). Upcoming Health Maintenance Date Due DTaP/Tdap/Td series (1 - Tdap) 11/13/2011 EYE EXAM RETINAL OR DILATED Q1 9/8/2016 FOOT EXAM Q1 11/23/2016 Influenza Age 5 to Adult 8/1/2017 GLAUCOMA SCREENING Q2Y 9/8/2017 MEDICARE YEARLY EXAM 3/14/2018 HEMOGLOBIN A1C Q6M 9/22/2018 Pneumococcal 65+ Low/Medium Risk (2 of 2 - PPSV23) 11/1/2018 MICROALBUMIN Q1 3/22/2019 LIPID PANEL Q1 3/22/2019 COLONOSCOPY 8/1/2024 Allergies as of 3/28/2018  Review Complete On: 3/28/2018 By: Diaz Salinas MD  
  
 Severity Noted Reaction Type Reactions Ace Inhibitors  04/20/2010   Side Effect Cough Current Immunizations  Reviewed on 4/15/2011 Name Date Influenza High Dose Vaccine PF 11/16/2015 Influenza Vaccine 10/1/2014, 11/1/2013 Pneumococcal Vaccine (Unspecified Type) 11/1/2013 Td 11/12/2011 Not reviewed this visit You Were Diagnosed With   
  
 Codes Comments Acute deep vein thrombosis (DVT) of other vein of lower extremity, unspecified laterality (HCC)    -  Primary ICD-10-CM: I82.409 ICD-9-CM: 453.40 Vitals BP Pulse Temp Resp Height(growth percentile) Weight(growth percentile) 135/64 (BP 1 Location: Right arm, BP Patient Position: Sitting) 62 98 °F (36.7 °C) (Oral) 18 5' 7\" (1.702 m) 134 lb 12.8 oz (61.1 kg) SpO2 BMI Smoking Status 97% 21.11 kg/m2 Never Smoker Vitals History BMI and BSA Data Body Mass Index Body Surface Area  
 21.11 kg/m 2 1.7 m 2 Preferred Pharmacy Pharmacy Name Phone [facility]/PHARMACY #[id number]- [address] AT [address] [phone] Your Updated Medication List  
  
   
This list is accurate as of 3/28/18 12:06 PM.  Always use your most recent med list.  
  
  
  
  
 aspirin delayed-release 81 mg tablet Take  by mouth daily. * donepezil 5 mg tablet Commonly known as:  ARICEPT  
TAKE 1 TABLET EVERY DAY  
  
 * donepezil 10 mg tablet Commonly known as:  ARICEPT  
TAKE 1 TABLET BY MOUTH EVERY DAY  
  
 finasteride 5 mg tablet Commonly known as:  PROSCAR  
TAKE 1 TABLET BY MOUTH DAILY  
  
 folic acid 1 mg tablet Commonly known as:  Google Take 1 Tab by mouth daily. glucose blood VI test strips strip Commonly known as:  Ascensia CONTOUR Use as directed to test blood sugar 3 times daily - DX-Dm-250.00  
  
 insulin glargine 100 unit/mL (3 mL) Inpn Commonly known as:  Lavenia Massing Decrease to 12 units daily after breakfast.  
  
 insulin lispro 100 unit/mL kwikpen Commonly known as:  HUMALOG Increase to 5 units before breakfast, 3 units before lunch, and 3 units before dinner. Plus sliding scale Insulin Needles (Disposable) 29 gauge x 1/2\" Ndle Commonly known as:  PEN NEEDLE, DIABETIC  
1 Package by Does Not Apply route two (2) times a day. Insulin Needles (Disposable) 32 gauge x 5/32\" Ndle Commonly known as:  Addis Pen Needle Use four times daily Dx Code E11.65 Lancets Misc Contour lancets- test blood sugar 3 times daily Lancing Device Misc To use daily Dx Code E11.65  
  
 losartan 50 mg tablet Commonly known as:  COZAAR Take 1 Tab by mouth daily. lovastatin 40 mg tablet Commonly known as:  MEVACOR  
TAKE 1 TABLET NIGHTLY  
  
 metFORMIN 1,000 mg tablet Commonly known as:  GLUCOPHAGE Take 1 Tab by mouth two (2) times a day. omeprazole 40 mg capsule Commonly known as:  PRILOSEC  
TAKE 1 CAPSULE BY MOUTH EVERYDAY tamsulosin 0.4 mg capsule Commonly known as:  FLOMAX Take 1 Cap by mouth nightly. traMADol 50 mg tablet Commonly known as:  ULTRAM  
Take 1 Tab by mouth every six (6) hours as needed. Max Daily Amount: 200 mg. VITAMIN D3 1,000 unit Cap Generic drug:  cholecalciferol Take  by mouth daily. * Notice: This list has 2 medication(s) that are the same as other medications prescribed for you. Read the directions carefully, and ask your doctor or other care provider to review them with you. Follow-up Instructions Return in about 6 weeks (around 5/9/2018). To-Do List   
 03/28/2018 Imaging:  DUPLEX LOWER EXT ARTERY BILAT   
  
 03/28/2018 Imaging:  DUPLEX LOWER EXT VENOUS BILAT   
  
 03/28/2018 Imaging:  XR FOOT LT MIN 3 V Patient Instructions 45 gm of carbs per day Stay on metformin 1000 mg twice a day with meals Check blood sugars immediately before each meal and at bedtime LANTUS  insulin  12   units  After  b-fast  
 
 Humalog  insulin  To 5    units before breakfast, 3 units before lunch and 3 units before dinner  When sugars are between 90 to 150 mg Pt wife to bring in a meal plan made by her from home ( to get the count of carbs and consistencies ) 45 gm per meal  
 
 
Add   humalog  as follows with meals  If blood sugars are[de-identified] 
 
150-200 mg 1 units 201-250 mg 2 units 251-300 mg 3 units 301-350 mg 4 units 351-400 mg 5 units 401-450 mg 6 units 451-500 mg 7 units Less than 90 mg NO INSULIN 
----------------------------------------------------------------------------------------- Introducing [facility] & HEALTH SERVICES! [facility] introduces [employer] patient portal. Now you can access parts of your medical record, email your doctor's office, and request medication refills online. 1. In your internet browser, go to [email or web address] "[email or web address]"/[email or web address] Software 2. Click on the First Time User? Click Here link in the Sign In box. You will see the New Member Sign Up page. 3. Enter your Paice Access Code exactly as it appears below. You will not need to use this code after youve completed the sign-up process. If you do not sign up before the expiration date, you must request a new code. · Paice Access Code: [email or web address] Expires: 6/26/2018 12:01 PM 
 
4. Enter the last four digits of your Social Security Number (xxxx) and Date of Birth (mm/dd/yyyy) as indicated and click Submit. You will be taken to the next sign-up page. 5. Create a Paice ID. This will be your Paice login ID and cannot be changed, so think of one that is secure and easy to remember. 6. Create a Paice password. You can change your password at any time. 7. Enter your Password Reset Question and Answer. This can be used at a later time if you forget your password. 8. Enter your e-mail address. You will receive e-mail notification when new information is available in [address] 9. Click Sign Up. You can now view and download portions of your medical record. 10. Click the Download Summary menu link to download a portable copy of your medical information. If you have questions, please visit the Frequently Asked Questions section of the Paice website. Remember, Paice is NOT to be used for urgent needs. For medical emergencies, dial 911. Now available from your iPhone and Android! Please provide this summary of care documentation to your next provider. Your primary care clinician is listed as Jayne Geronimo. If you have any questions after today's visit, please call [phone].

## 2018-03-28 NOTE — PROGRESS NOTES
HISTORY OF PRESENT ILLNESS  Yaron Sanchez is a 70 y.o. male. HPI   f/u visit after last visit for DM  2  Management   From nov 28 2017     accompanied by wife   S/p surgery for prostrate surgery -  Jan 2018     Had very fluctuant sugars   Some high sugars from eating snacks in between meals     no low blood sugar   He is here with log , food as well as glucose       Wife is helpful in providing the info           Prior history   Referred : by self    H/o diabetes for several  years   Accompanied by wife who is providing the history     Pt in general has drowsiness,   Per WIFE   He was given elavil 50 mg the night before the hospitalization  Current A1C is 9.9 % and symptoms/problems include polyuria, polydipsia and visual disturbances     Current diabetic medications include metformin. When discharged from hospital  On feb 16 2017  , he was told to take only Metformin 1 tab bid   He was told to not to take Toujeo  And jardiance     Current monitoring regimen: home blood tests - 3-4 times a day   Home blood sugar records: trend: fluctuating a lot  Any episodes of hypoglycemia?  yes - multiple soon after Toujeo is taken   He has taken 40 units     Weight trend: stable  Prior visit with dietician: no  Current diet: \"unhealthy\" diet in general  Current exercise: no regular exercise    Known diabetic complications: peripheral neuropathy and cerebrovascular disease  Cardiovascular risk factors: dyslipidemia, diabetes mellitus, obesity, male gender, hypertension, stress    Eye exam current (within one year): yes  LUCIANA: no     Past Medical History:   Diagnosis Date    Cataract     left eye cataract surg    Chronic pain     copd     error    Diabetes ([address]     Hammer toe     HLD (hyperlipidemia) 4/22/2010    Hypertension     Other ill-defined conditions(799.89)     CLULITIS BILATERAL  FEET    Other ill-defined conditions(799.89)     BILATERAL GLAUCOMA     Past Surgical History:   Procedure Laterality Date    HX CATARACT REMOVAL Bilateral     HX GI      COLOONSCOPY    HX ORTHOPAEDIC  2012    LOWER BACK SURGERY    HX OTHER SURGICAL      CYSTS LT ARM FORHEAD AND RT UPPER QUADRANT    HX UVULOPALATOPHARYNGOPLASTY       Current Outpatient Prescriptions   Medication Sig    donepezil (ARICEPT) 10 mg tablet TAKE 1 TABLET BY MOUTH EVERY DAY    finasteride (PROSCAR) 5 mg tablet TAKE 1 TABLET BY MOUTH DAILY    insulin glargine (LANTUS,BASAGLAR) 100 unit/mL (3 mL) inpn Decrease to 12 units daily after breakfast.    insulin lispro (HUMALOG) 100 unit/mL kwikpen Increase to 5 units before breakfast, 3 units before lunch, and 3 units before dinner. Plus sliding scale    Insulin Needles, Disposable, (GILLIAN PEN NEEDLE) 32 gauge x 5/32\" ndle Use four times daily Dx Code E11.65    cholecalciferol (VITAMIN D3) 1,000 unit cap Take  by mouth daily.  aspirin delayed-release 81 mg tablet Take  by mouth daily.  Lancing Device misc To use daily Dx Code E11.65    lovastatin (MEVACOR) 40 mg tablet TAKE 1 TABLET NIGHTLY    losartan (COZAAR) 50 mg tablet Take 1 Tab by mouth daily.  folic acid (FOLVITE) 1 mg tablet Take 1 Tab by mouth daily.  metFORMIN (GLUCOPHAGE) 1,000 mg tablet Take 1 Tab by mouth two (2) times a day.  glucose blood VI test strips (ASCENSIA CONTOUR) strip Use as directed to test blood sugar 3 times daily - DX-Dm-250.00    Lancets misc Contour lancets- test blood sugar 3 times daily    Insulin Needles, Disposable, (INSULIN PEN NEEDLE) 29 x 1/2 \" ndle 1 Package by Does Not Apply route two (2) times a day.  donepezil (ARICEPT) 5 mg tablet TAKE 1 TABLET EVERY DAY    traMADol (ULTRAM) 50 mg tablet Take 1 Tab by mouth every six (6) hours as needed. Max Daily Amount: 200 mg.    omeprazole (PRILOSEC) 40 mg capsule TAKE 1 CAPSULE BY MOUTH EVERYDAY    tamsulosin (FLOMAX) 0.4 mg capsule Take 1 Cap by mouth nightly. No current facility-administered medications for this visit.         Review of Systems Constitutional: Negative. HENT:        He does have swallowing difficulties    Eyes: Negative for pain and redness. Respiratory: Negative. Cardiovascular: Negative for chest pain, palpitations and leg swelling. Gastrointestinal: Negative. Negative for constipation. Genitourinary: Negative. Musculoskeletal: Negative for myalgias. Skin: Negative. Neurological: Negative. Endo/Heme/Allergies: Negative. Psychiatric/Behavioral: Negative for depression and memory loss. The patient does not have insomnia. Physical Exam   Constitutional: He is oriented to person, place, and time. He appears well-developed and well-nourished. HENT:   Head: Normocephalic. Eyes: Conjunctivae and EOM are normal. Pupils are equal, round, and reactive to light. Neck: Normal range of motion. Neck supple. No JVD present. No tracheal deviation present. Thyromegaly present. Cardiovascular: Normal rate, regular rhythm and normal heart sounds. Pulmonary/Chest: Effort normal and breath sounds normal.   Abdominal: Soft. Bowel sounds are normal.   Musculoskeletal: Normal range of motion. Lymphadenopathy:     He has no cervical adenopathy. Neurological: He is alert and oriented to person, place, and time. He has normal reflexes. Skin: Skin is warm.    Psychiatric: none     Diabetic foot exam: March 2018    Left: Reflexes nd     Vibratory sensation normal    Proprioception nd   Sharp/dull discrimination nd    Filament test reduced sensation with micro filament   Pulse DP: 2+ (normal)   Pulse PT: nd   Deformities: Yes - swollen foot  3 +  Right: Reflexes nd   Vibratory sensation normal   Proprioception nd   Sharp/dull discrimination nd   Filament test reduced sensation with micro filament   Pulse DP: 2+ (normal)   Pulse PT: nd   Deformities: Yes - swollen foot 2 +        Lab Results   Component Value Date/Time    Hemoglobin A1c 10.2 (H) 03/22/2018 01:37 PM    Hemoglobin A1c 8.9 (H) 11/22/2017 11:48 AM Hemoglobin A1c 9.0 (H) 08/11/2017 12:04 PM    Hemoglobin A1c, External 9.4 12/04/2015 11:36 AM    Glucose 202 (H) 03/22/2018 01:37 PM    Glucose (POC) 273 (H) 09/05/2017 11:16 AM    Microalbumin/Creat ratio (mg/g creat) 40 (H) 04/20/2010 01:00 PM    Microalb/Creat ratio (ug/mg creat.) 271.8 (H) 11/22/2017 11:48 AM    Microalbumin,urine random 6.54 04/20/2010 01:00 PM    LDL, calculated 54 03/22/2018 01:37 PM    Creatinine 0.92 03/22/2018 01:37 PM      Lab Results   Component Value Date/Time    Cholesterol, total 150 03/22/2018 01:37 PM    HDL Cholesterol 87 03/22/2018 01:37 PM    LDL, calculated 54 03/22/2018 01:37 PM    Triglyceride 47 03/22/2018 01:37 PM    CHOL/HDL Ratio 2.0 04/20/2010 01:00 PM       Lab Results   Component Value Date/Time    ALT (SGPT) 14 03/22/2018 01:37 PM    AST (SGOT) 19 03/22/2018 01:37 PM    Alk. phosphatase 91 03/22/2018 01:37 PM    Bilirubin, direct 0.1 04/20/2010 01:00 PM    Bilirubin, total 0.2 03/22/2018 01:37 PM       Lab Results   Component Value Date/Time    GFR est AA 93 03/22/2018 01:37 PM    GFR est non-AA 81 03/22/2018 01:37 PM    Creatinine 0.92 03/22/2018 01:37 PM    BUN 18 03/22/2018 01:37 PM    Sodium 139 03/22/2018 01:37 PM    Potassium 4.8 03/22/2018 01:37 PM    Chloride 99 03/22/2018 01:37 PM    CO2 28 03/22/2018 01:37 PM            ASSESSMENT and PLAN      1. Bilateral swelling of feet  : left more than right   Post surgery   Rule out DVT - ordered usg of LE  Ordered xray left foot     2.  Type 2 DM poorly controlled : a1c is  Over 10 %     From march 2018   Compared to  8.9 %     From nov 2017   Compared to   9 %     From     Aug 2017     comapred to  8.9 %    From   Today by finger stick   Compared to   11 %    From  April 2017    compared to   9.9 %   From march 2017      Reviewed the glucose log , noticing some improvement   Flucutation is expected with him as he behaves like type 1     safety is first   Explained to wife how important is that he gets insulin by carbs   Gave her a brief idea about the importance     Wife is told to add a unit of humalog if pt eats more carbs per meal   She brought  meal plan that works for her for a week    Continuing   on  Lantus in AM, stop [place]  on humalog and very easy to follow sliding scale     Patient is advised to check blood sugars 4 times daily by rotation method. reviewed medications and side effects in detail  lab results and schedule of future lab studies reviewed with patient        2. Hypoglycemia :  Educated on treating the hypoglycemia. No inadvertant use of insulin, wife clarifies that he takes insulin whenever     3. HTN : continue cozaar 50 mg . Patient is educated about importance of compliance with anti-hypertensives especially ARB/ACEI    4. Dyslipidemia : continue Mevacor 40 mg hs . Patient is educated about benefits and adverse effects of statins and explained how benefits outweigh risk. 5. use of aspirin to prevent MI and TIA's discussed      6. Multi nodular goiter : Euthyroid   Thyroid usg - dec 2015 , Left side he has 3 nodules,  1.5 cm dominant nodule and then one mid level 1.2 cm and one 0.6 cm on upper pole   Right side upper pole it is 1.2 cm   He needs a f/u usg - ordered today       7. Anemia - pcp will follow       8. Decreased Mental alertness  And suggesting eval to rule out dementia : f/u with neurologist   F/u with [facility]    9 s/p spinal stenosis surgery -  Maintenance of blood sugars is importance for good recovery       10. Bladder issues - on catheter   f/u with urologist , prostrate       11. Dysphagia occasionally       12.  Sleep apnoea to be evaluated        > 50 % of time is spent on counseling

## 2018-05-10 NOTE — PROGRESS NOTES
Wt Readings from Last 3 Encounters:   05/10/18 133 lb 4.8 oz (60.5 kg)   03/28/18 134 lb 12.8 oz (61.1 kg)   01/10/18 130 lb (59 kg)     Temp Readings from Last 3 Encounters:   05/10/18 97.6 °F (36.4 °C) (Oral)   03/28/18 98 °F (36.7 °C) (Oral)   01/10/18 (!) 94.2 °F (34.6 °C) (Oral)     BP Readings from Last 3 Encounters:   05/10/18 106/59   03/28/18 135/64   01/10/18 131/56     Pulse Readings from Last 3 Encounters:   05/10/18 64   03/28/18 62   01/10/18 65     Patient has logbook today.

## 2018-05-10 NOTE — PATIENT INSTRUCTIONS
--------------------------------------------------------------------------------------------    Refills    -    please call your pharmacy and have them send us a refill request    Results  -  allow up to a week for lab results to be processed and reviewed. Phone calls  -  Allow upto 24 hrs.  for non-urgent calls to be retained    Prior authorization - It may take up to 2 weeks to process, depending on your insurance    Forms  -  FMLA, DMV, patient assistance, etc. will take up to 2 weeks to process    Cancellations - please notify the office in advance if you cannot keep your appointment    Samples  - will only be dispensed at visits as supply is limited      If you are having a medical emergency call 911    --------------------------------------------------------------------------------------------      45 gm of carbs per day       Stay on metformin 1000 mg twice a day with meals     Check blood sugars immediately before each meal and at bedtime        LANTUS  insulin  12   units  After  b-fast      Humalog  insulin  To 5    units before breakfast, 3 units before lunch and 3 units before dinner  When sugars are between 90 to 150 mg     Pt wife to bring in a meal plan made by her from home ( to get the count of carbs and consistencies )      45 gm per meal       Add   humalog  as follows with meals  If blood sugars are[de-identified]    150-200 mg 1 units    201-250 mg 2 units    251-300 mg 3 units    301-350 mg 4 units    351-400 mg 5 units    401-450 mg 6 units    451-500 mg 7 units     Less than 90 mg NO INSULIN  -----------------------------------------------------------------------------------------

## 2018-05-10 NOTE — MR AVS SNAPSHOT
[address] [address] 
[phone] Patient: Margarita Lowry MRN: [id number] [id number] Visit Information Date & Time Provider Department Dept. Phone Encounter #  
 5/10/2018  4:15 PM Sapna Hoffman MD [facility] Diabetes & Endocrinology [phone] [id number] Follow-up Instructions Return in about 3 months (around 8/10/2018). Upcoming Health Maintenance Date Due DTaP/Tdap/Td series (1 - Tdap) 11/13/2011 FOOT EXAM Q1 11/23/2016 MEDICARE YEARLY EXAM 3/14/2018 Influenza Age 5 to Adult 8/1/2018 HEMOGLOBIN A1C Q6M 9/22/2018 EYE EXAM RETINAL OR DILATED Q1 10/2/2018 Pneumococcal 65+ Low/Medium Risk (2 of 2 - PPSV23) 11/1/2018 LIPID PANEL Q1 3/22/2019 MICROALBUMIN Q1 3/28/2019 GLAUCOMA SCREENING Q2Y 10/2/2019 COLONOSCOPY 8/1/2024 Allergies as of 5/10/2018  Review Complete On: 5/10/2018 By: Sapna Hoffman MD  
  
 Severity Noted Reaction Type Reactions Ace Inhibitors  04/20/2010   Side Effect Cough Current Immunizations  Reviewed on 4/15/2011 Name Date Influenza High Dose Vaccine PF 11/16/2015 Influenza Vaccine 10/1/2014, 11/1/2013 Pneumococcal Vaccine (Unspecified Type) 11/1/2013 Td 11/12/2011 Not reviewed this visit Vitals BP Pulse Temp Resp Height(growth percentile) Weight(growth percentile) 106/59 (BP 1 Location: Right arm, BP Patient Position: Sitting) 64 97.6 °F (36.4 °C) (Oral) 18 5' 7\" (1.702 m) 133 lb 4.8 oz (60.5 kg) SpO2 BMI Smoking Status 96% 20.88 kg/m2 Never Smoker Vitals History BMI and BSA Data Body Mass Index Body Surface Area  
 20.88 kg/m 2 1.69 m 2 Preferred Pharmacy Pharmacy Name Phone [id number] [address] AT [place] [address] [phone] Your Updated Medication List  
  
   
 This list is accurate as of 5/10/18  4:56 PM.  Always use your most recent med list.  
  
  
  
  
 aspirin delayed-release 81 mg tablet Take  by mouth daily. * donepezil 5 mg tablet Commonly known as:  ARICEPT  
TAKE 1 TABLET EVERY DAY  
  
 * donepezil 10 mg tablet Commonly known as:  ARICEPT  
TAKE 1 TABLET BY MOUTH EVERY DAY  
  
 finasteride 5 mg tablet Commonly known as:  PROSCAR  
TAKE 1 TABLET BY MOUTH DAILY  
  
 folic acid 1 mg tablet Commonly known as:  Google Take 1 Tab by mouth daily. furosemide 40 mg tablet Commonly known as:  LASIX TAKE 1 TABLET BY MOUTH EVERY MORNING AS NEEDED FOR LEGS SWELLING  
  
 glucose blood VI test strips strip Commonly known as:  Ascensia CONTOUR Use as directed to test blood sugar 3 times daily - DX-Dm-250.00  
  
 insulin glargine 100 unit/mL (3 mL) Inpn Commonly known as:  Brayden Edge Decrease to 12 units daily after breakfast.  
  
 insulin lispro 100 unit/mL kwikpen Commonly known as:  HUMALOG Increase to 5 units before breakfast, 3 units before lunch, and 3 units before dinner. Plus sliding scale Insulin Needles (Disposable) 29 gauge x 1/2\" Ndle Commonly known as:  PEN NEEDLE, DIABETIC  
1 Package by Does Not Apply route two (2) times a day. Insulin Needles (Disposable) 32 gauge x 5/32\" Ndle Commonly known as:  Addis Pen Needle Use four times daily Dx Code E11.65 Lancets Misc Contour lancets- test blood sugar 3 times daily Lancing Device Misc To use daily Dx Code E11.65  
  
 losartan 50 mg tablet Commonly known as:  COZAAR Take 1 Tab by mouth daily. lovastatin 40 mg tablet Commonly known as:  MEVACOR  
TAKE 1 TABLET NIGHTLY  
  
 memantine 10 mg tablet Commonly known as:  Pollyann J Carlos TAKE 1 TABLET BY MOUTH TWICE A DAY  
  
 metFORMIN 1,000 mg tablet Commonly known as:  GLUCOPHAGE Take 1 Tab by mouth two (2) times a day. omeprazole 40 mg capsule Commonly known as:  PRILOSEC  
TAKE 1 CAPSULE BY MOUTH EVERYDAY  
  
 tamsulosin 0.4 mg capsule Commonly known as:  FLOMAX Take 1 Cap by mouth nightly. traMADol 50 mg tablet Commonly known as:  ULTRAM  
Take 1 Tab by mouth every six (6) hours as needed. Max Daily Amount: 200 mg. VITAMIN D3 1,000 unit Cap Generic drug:  cholecalciferol Take  by mouth daily. * Notice: This list has 2 medication(s) that are the same as other medications prescribed for you. Read the directions carefully, and ask your doctor or other care provider to review them with you. Follow-up Instructions Return in about 3 months (around 8/10/2018). Patient Instructions   
-------------------------------------------------------------------------------------------- Refills    -    please call your pharmacy and have them send us a refill request 
 
Results  -  allow up to a week for lab results to be processed and reviewed. Phone calls  -  Allow upto 24 hrs. for non-urgent calls to be retained Prior authorization - It may take up to 2 weeks to process, depending on your insurance Forms  -  FMLA, DMV, patient assistance, etc. will take up to 2 weeks to process Cancellations - please notify the office in advance if you cannot keep your appointment Samples  - will only be dispensed at visits as supply is limited If you are having a medical emergency call 911 
 
-------------------------------------------------------------------------------------------- 
 
 
45 gm of carbs per day Stay on metformin 1000 mg twice a day with meals Check blood sugars immediately before each meal and at bedtime LANTUS  insulin  12   units  After  b-fast  
 
 Humalog  insulin  To 5    units before breakfast, 3 units before lunch and 3 units before dinner  When sugars are between 90 to 150 mg Pt wife to bring in a meal plan made by her from home ( to get the count of carbs and consistencies ) 45 gm per meal  
 
 
Add   humalog  as follows with meals  If blood sugars are[de-identified] 
 
150-200 mg 1 units 201-250 mg 2 units 251-300 mg 3 units 301-350 mg 4 units 351-400 mg 5 units 401-450 mg 6 units 451-500 mg 7 units Less than 90 mg NO INSULIN 
----------------------------------------------------------------------------------------- Introducing [facility]! Jose Ramon Rodríguez introduces [employer] patient portal. Now you can access parts of your medical record, email your doctor's office, and request medication refills online. 1. In your internet browser, go to [email or web address] 2. Click on the First Time User? Click Here link in the Sign In box. You will see the New Member Sign Up page. 3. Enter your [employer] Access Code exactly as it appears below. You will not need to use this code after youve completed the sign-up process. If you do not sign up before the expiration date, you must request a new code. · [employer] Access Code: [email or web address] Expires: 6/26/2018 12:01 PM 
 
4. Enter the last four digits of your Social Security Number (xxxx) and Date of Birth (mm/dd/yyyy) as indicated and click Submit. You will be taken to the next sign-up page. 5. Create a [employer] ID. This will be your [employer] login ID and cannot be changed, so think of one that is secure and easy to remember. 6. Create a [employer] password. You can change your password at any time. 7. Enter your Password Reset Question and Answer. This can be used at a later time if you forget your password. 8. Enter your e-mail address. You will receive e-mail notification when new information is available in [address] 9. Click Sign Up. You can now view and download portions of your medical record. 10. Click the Download Summary menu link to download a portable copy of your medical information. If you have questions, please visit the Frequently Asked Questions section of the [employer] website. Remember, [facility] [employer] is NOT to be used for urgent needs. For medical emergencies, dial 911. Now available from your iPhone and Android! Please provide this summary of care documentation to your next provider. Your primary care clinician is listed as Ani Roche. If you have any questions after today's visit, please call [phone].

## 2018-05-10 NOTE — PROGRESS NOTES
HISTORY OF PRESENT ILLNESS  Leslee Cristobal is a 70 y.o. male. HPI   f/u visit after last visit for DM  2  Management   From March 2018     accompanied by wife           Old history :  S/p surgery for prostrate surgery -  Jan 2018     Had very fluctuant sugars   Some high sugars from eating snacks in between meals     no low blood sugar   He is here with log , food as well as glucose       Wife is helpful in providing the info           Prior history   Referred : by self    H/o diabetes for several  years   Accompanied by wife who is providing the history     Pt in general has drowsiness,   Per WIFE   He was given elavil 50 mg the night before the hospitalization  Current A1C is 9.9 % and symptoms/problems include polyuria, polydipsia and visual disturbances     Current diabetic medications include metformin. When discharged from hospital  On feb 16 2017  , he was told to take only Metformin 1 tab bid   He was told to not to take Toujeo  And jardiance     Current monitoring regimen: home blood tests - 3-4 times a day   Home blood sugar records: trend: fluctuating a lot  Any episodes of hypoglycemia?  yes - multiple soon after Toujeo is taken   He has taken 40 units     Weight trend: stable  Prior visit with dietician: no  Current diet: \"unhealthy\" diet in general  Current exercise: no regular exercise    Known diabetic complications: peripheral neuropathy and cerebrovascular disease  Cardiovascular risk factors: dyslipidemia, diabetes mellitus, obesity, male gender, hypertension, stress    Eye exam current (within one year): yes  LUCIANA: no     Past Medical History:   Diagnosis Date    Cataract     left eye cataract surg    Chronic pain     copd     error    Diabetes ([address]     Hammer toe     HLD (hyperlipidemia) 4/22/2010    Hypertension     Other ill-defined conditions(799.89)     CLULITIS BILATERAL  FEET    Other ill-defined conditions(799.89)     BILATERAL GLAUCOMA     Past Surgical History:   Procedure Laterality Date    HX CATARACT REMOVAL Bilateral     HX GI      COLOONSCOPY    HX ORTHOPAEDIC  2012    LOWER BACK SURGERY    HX OTHER SURGICAL      CYSTS LT ARM FORHEAD AND RT UPPER QUADRANT    HX UVULOPALATOPHARYNGOPLASTY       Current Outpatient Prescriptions   Medication Sig    furosemide (LASIX) 40 mg tablet TAKE 1 TABLET BY MOUTH EVERY MORNING AS NEEDED FOR LEGS SWELLING    memantine (NAMENDA) 10 mg tablet TAKE 1 TABLET BY MOUTH TWICE A DAY    donepezil (ARICEPT) 10 mg tablet TAKE 1 TABLET BY MOUTH EVERY DAY    insulin glargine (LANTUS,BASAGLAR) 100 unit/mL (3 mL) inpn Decrease to 12 units daily after breakfast.    insulin lispro (HUMALOG) 100 unit/mL kwikpen Increase to 5 units before breakfast, 3 units before lunch, and 3 units before dinner. Plus sliding scale    Insulin Needles, Disposable, (GILLIAN PEN NEEDLE) 32 gauge x 5/32\" ndle Use four times daily Dx Code E11.65    aspirin delayed-release 81 mg tablet Take  by mouth daily.  omeprazole (PRILOSEC) 40 mg capsule TAKE 1 CAPSULE BY MOUTH EVERYDAY    Lancing Device misc To use daily Dx Code E11.65    tamsulosin (FLOMAX) 0.4 mg capsule Take 1 Cap by mouth nightly.  lovastatin (MEVACOR) 40 mg tablet TAKE 1 TABLET NIGHTLY    losartan (COZAAR) 50 mg tablet Take 1 Tab by mouth daily.  folic acid (FOLVITE) 1 mg tablet Take 1 Tab by mouth daily.  metFORMIN (GLUCOPHAGE) 1,000 mg tablet Take 1 Tab by mouth two (2) times a day.  glucose blood VI test strips (ASCENSIA CONTOUR) strip Use as directed to test blood sugar 3 times daily - DX-Dm-250.00    Lancets misc Contour lancets- test blood sugar 3 times daily    Insulin Needles, Disposable, (INSULIN PEN NEEDLE) 29 x 1/2 \" ndle 1 Package by Does Not Apply route two (2) times a day.  finasteride (PROSCAR) 5 mg tablet TAKE 1 TABLET BY MOUTH DAILY    donepezil (ARICEPT) 5 mg tablet TAKE 1 TABLET EVERY DAY    traMADol (ULTRAM) 50 mg tablet Take 1 Tab by mouth every six (6) hours as needed. Max Daily Amount: 200 mg.  cholecalciferol (VITAMIN D3) 1,000 unit cap Take  by mouth daily. No current facility-administered medications for this visit. Review of Systems   Constitutional: Negative. HENT:        He does have swallowing difficulties    Eyes: Negative for pain and redness. Respiratory: Negative. Cardiovascular: Negative for chest pain, palpitations and leg swelling. Gastrointestinal: Negative. Negative for constipation. Genitourinary: Negative. Musculoskeletal: Negative for myalgias. Skin: Negative. Neurological: Negative. Endo/Heme/Allergies: Negative. Psychiatric/Behavioral: Negative for depression and memory loss. The patient does not have insomnia. Physical Exam   Constitutional: He is oriented to person, place, and time. He appears well-developed and well-nourished. HENT:   Head: Normocephalic. Eyes: Conjunctivae and EOM are normal. Pupils are equal, round, and reactive to light. Neck: Normal range of motion. Neck supple. No JVD present. No tracheal deviation present. Thyromegaly present. Cardiovascular: Normal rate, regular rhythm and normal heart sounds. Pulmonary/Chest: Effort normal and breath sounds normal.   Abdominal: Soft. Bowel sounds are normal.   Musculoskeletal: Normal range of motion. Lymphadenopathy:     He has no cervical adenopathy. Neurological: He is alert and oriented to person, place, and time. He has normal reflexes. Skin: Skin is warm.    Psychiatric: none     Diabetic foot exam: March 2018    Left: Reflexes nd     Vibratory sensation normal    Proprioception nd   Sharp/dull discrimination nd    Filament test reduced sensation with micro filament   Pulse DP: 2+ (normal)   Pulse PT: nd   Deformities: Yes - swollen foot  3 +  Right: Reflexes nd   Vibratory sensation normal   Proprioception nd   Sharp/dull discrimination nd   Filament test reduced sensation with micro filament   Pulse DP: 2+ (normal)   Pulse PT: nd   Deformities: Yes - swollen foot 2 +        Lab Results   Component Value Date/Time    Hemoglobin A1c 10.2 (H) 03/22/2018 01:37 PM    Hemoglobin A1c 8.9 (H) 11/22/2017 11:48 AM    Hemoglobin A1c 9.0 (H) 08/11/2017 12:04 PM    Hemoglobin A1c, External 9.4 12/04/2015 11:36 AM    Glucose 202 (H) 03/22/2018 01:37 PM    Glucose (POC) 273 (H) 09/05/2017 11:16 AM    Microalbumin/Creat ratio (mg/g creat) 40 (H) 04/20/2010 01:00 PM    Microalb/Creat ratio (ug/mg creat.) 125.4 (H) 03/28/2018 12:00 AM    Microalbumin,urine random 6.54 04/20/2010 01:00 PM    LDL, calculated 54 03/22/2018 01:37 PM    Creatinine 0.92 03/22/2018 01:37 PM      Lab Results   Component Value Date/Time    Cholesterol, total 150 03/22/2018 01:37 PM    HDL Cholesterol 87 03/22/2018 01:37 PM    LDL, calculated 54 03/22/2018 01:37 PM    Triglyceride 47 03/22/2018 01:37 PM    CHOL/HDL Ratio 2.0 04/20/2010 01:00 PM       Lab Results   Component Value Date/Time    ALT (SGPT) 14 03/22/2018 01:37 PM    AST (SGOT) 19 03/22/2018 01:37 PM    Alk. phosphatase 91 03/22/2018 01:37 PM    Bilirubin, direct 0.1 04/20/2010 01:00 PM    Bilirubin, total 0.2 03/22/2018 01:37 PM       Lab Results   Component Value Date/Time    GFR est AA 93 03/22/2018 01:37 PM    GFR est non-AA 81 03/22/2018 01:37 PM    Creatinine 0.92 03/22/2018 01:37 PM    BUN 18 03/22/2018 01:37 PM    Sodium 139 03/22/2018 01:37 PM    Potassium 4.8 03/22/2018 01:37 PM    Chloride 99 03/22/2018 01:37 PM    CO2 28 03/22/2018 01:37 PM            ASSESSMENT and PLAN      1.  Type 2 DM poorly controlled : a1c is  Over 10 %     From march 2018   Compared to  8.9 %     From nov 2017   Compared to   9 %     From     Aug 2017     comapred to  8.9 %    From   Today by finger stick   Compared to   11 %    From  April 2017    compared to   9.9 %   From march 2017      Reviewed the glucose log , noticing some improvement   Flucutation is expected with him as he behaves like type 1     safety is first   Explained to wife how important is that he gets insulin by carbs   Gave her a brief idea about the importance     Wife is told to add a unit  Or two units of humalog if pt eats more carbs per meal   She brought  meal plan that works for her for a week    Continuing   on  Lantus in AM, stop [place]  on humalog and very easy to follow sliding scale     Patient is advised to check blood sugars 4 times daily by rotation method. reviewed medications and side effects in detail  lab results and schedule of future lab studies reviewed with patient        2. Hypoglycemia :  Educated on treating the hypoglycemia. No inadvertant use of insulin, wife clarifies that he takes insulin whenever     3. HTN : continue cozaar 50 mg . Patient is educated about importance of compliance with anti-hypertensives especially ARB/ACEI    4. Dyslipidemia : continue Mevacor 40 mg hs . Patient is educated about benefits and adverse effects of statins and explained how benefits outweigh risk. 5. use of aspirin to prevent MI and TIA's discussed      6. Multi nodular goiter : Euthyroid   Thyroid usg - dec 2015 , Left side he has 3 nodules,  1.5 cm dominant nodule and then one mid level 1.2 cm and one 0.6 cm on upper pole   Right side upper pole it is 1.2 cm   He needs a f/u usg - ordered today       7. Anemia - pcp will follow       8. Decreased Mental alertness  And suggesting eval to rule out dementia : f/u with neurologist   F/u with Leonor Squires    9 s/p spinal stenosis surgery -  Maintenance of blood sugars is importance for good recovery       10. Bladder issues - on catheter   f/u with urologist , prostrate       11. Dysphagia occasionally       12. Sleep apnoea to be evaluated        13.  Bilateral swelling of feet  : left more than right   Post surgery   Ruled  out DVTs -   He f/u with pcp          > 50 % of time is spent on counseling   Patient voiced understanding her plan of care

## 2018-08-02 NOTE — PROGRESS NOTES
Reviewed the food log and I find that the sugars are better   Occasionally I see use of peanut better and a bit high sugar . Raymond Nicolas      Otherwise , all are good

## 2018-08-30 NOTE — PROGRESS NOTES
Wt Readings from Last 3 Encounters:   08/30/18 133 lb (60.3 kg)   05/10/18 133 lb 4.8 oz (60.5 kg)   03/28/18 134 lb 12.8 oz (61.1 kg)     Temp Readings from Last 3 Encounters:   08/30/18 97 °F (36.1 °C) (Oral)   05/10/18 97.6 °F (36.4 °C) (Oral)   03/28/18 98 °F (36.7 °C) (Oral)     BP Readings from Last 3 Encounters:   08/30/18 112/66   05/10/18 106/59   03/28/18 135/64     Pulse Readings from Last 3 Encounters:   08/30/18 66   05/10/18 64   03/28/18 62     Lab Results   Component Value Date/Time    Hemoglobin A1c 9.6 (H) 08/23/2018 09:57 AM    Hemoglobin A1c (POC) 8.9 06/07/2017 04:31 PM    Hemoglobin A1c, External 9.4 12/04/2015 11:36 AM

## 2018-08-30 NOTE — PROGRESS NOTES
HISTORY OF PRESENT ILLNESS  Davina Antonio is a 70 y.o. male. HPI   f/u visit after last visit for DM  2  Management   From May  2018     accompanied by wife   She got the log and it has better sugars     Interestingly, he has high sugars in the past one week   Noticed a blister on right foot           Old history :  S/p surgery for prostrate surgery -  Jan 2018     Had very fluctuant sugars   Some high sugars from eating snacks in between meals     no low blood sugar   He is here with log , food as well as glucose       Wife is helpful in providing the info           Prior history   Referred : by self    H/o diabetes for several  years   Accompanied by wife who is providing the history     Pt in general has drowsiness,   Per WIFE   He was given elavil 50 mg the night before the hospitalization  Current A1C is 9.9 % and symptoms/problems include polyuria, polydipsia and visual disturbances     Current diabetic medications include metformin. When discharged from hospital  On feb 16 2017  , he was told to take only Metformin 1 tab bid   He was told to not to take Toujeo  And jardiance     Current monitoring regimen: home blood tests - 3-4 times a day   Home blood sugar records: trend: fluctuating a lot  Any episodes of hypoglycemia?  yes - multiple soon after Toujeo is taken   He has taken 40 units     Weight trend: stable  Prior visit with dietician: no  Current diet: \"unhealthy\" diet in general  Current exercise: no regular exercise    Known diabetic complications: peripheral neuropathy and cerebrovascular disease  Cardiovascular risk factors: dyslipidemia, diabetes mellitus, obesity, male gender, hypertension, stress    Eye exam current (within one year): yes  LUCIANA: no     Past Medical History:   Diagnosis Date    Cataract     left eye cataract surg    Chronic pain     copd     error    Diabetes ([address]     Hammer toe     HLD (hyperlipidemia) 4/22/2010    Hypertension     Other ill-defined conditions(799.89)     CLULITIS BILATERAL  FEET    Other ill-defined conditions(799.89)     BILATERAL GLAUCOMA     Past Surgical History:   Procedure Laterality Date    HX CATARACT REMOVAL Bilateral     HX GI      COLOONSCOPY    HX ORTHOPAEDIC  2012    LOWER BACK SURGERY    HX OTHER SURGICAL      CYSTS LT ARM FORHEAD AND RT UPPER QUADRANT    HX UVULOPALATOPHARYNGOPLASTY       Current Outpatient Prescriptions   Medication Sig    timolol (TIMOPTIC) 0.5 % ophthalmic solution     potassium chloride (KLOR-CON PO) Take  by mouth.  insulin lispro (HUMALOG) 100 unit/mL kwikpen Increase to 5 units before breakfast, 3 units before lunch, and 3 units before dinner. Plus sliding scale    furosemide (LASIX) 40 mg tablet TAKE 1 TABLET BY MOUTH EVERY MORNING AS NEEDED FOR LEGS SWELLING    memantine (NAMENDA) 10 mg tablet TAKE 1 TABLET BY MOUTH TWICE A DAY    donepezil (ARICEPT) 10 mg tablet TAKE 1 TABLET BY MOUTH EVERY DAY    finasteride (PROSCAR) 5 mg tablet TAKE 1 TABLET BY MOUTH DAILY    donepezil (ARICEPT) 5 mg tablet TAKE 1 TABLET EVERY DAY    insulin glargine (LANTUS,BASAGLAR) 100 unit/mL (3 mL) inpn Decrease to 12 units daily after breakfast.    traMADol (ULTRAM) 50 mg tablet Take 1 Tab by mouth every six (6) hours as needed. Max Daily Amount: 200 mg.  cholecalciferol (VITAMIN D3) 1,000 unit cap Take  by mouth daily.  aspirin delayed-release 81 mg tablet Take  by mouth daily.  omeprazole (PRILOSEC) 40 mg capsule TAKE 1 CAPSULE BY MOUTH EVERYDAY    tamsulosin (FLOMAX) 0.4 mg capsule Take 1 Cap by mouth nightly.  lovastatin (MEVACOR) 40 mg tablet TAKE 1 TABLET NIGHTLY    losartan (COZAAR) 50 mg tablet Take 1 Tab by mouth daily.  folic acid (FOLVITE) 1 mg tablet Take 1 Tab by mouth daily.  metFORMIN (GLUCOPHAGE) 1,000 mg tablet Take 1 Tab by mouth two (2) times a day.     glucose blood VI test strips (ASCENSIA CONTOUR) strip Use as directed to test blood sugar 3 times daily - DX-Dm-250.00  Insulin Needles, Disposable, (GILLIAN PEN NEEDLE) 32 gauge x 5/32\" ndle Use four times daily Dx Code E11.65    Lancing Device misc To use daily Dx Code E11.65    Lancets misc Contour lancets- test blood sugar 3 times daily    Insulin Needles, Disposable, (INSULIN PEN NEEDLE) 29 x 1/2 \" ndle 1 Package by Does Not Apply route two (2) times a day. No current facility-administered medications for this visit. Review of Systems   Constitutional: Negative. HENT:        He does have swallowing difficulties    Eyes: Negative for pain and redness. Respiratory: Negative. Cardiovascular: Negative for chest pain, palpitations and leg swelling. Gastrointestinal: Negative. Negative for constipation. Genitourinary: Negative. Musculoskeletal: Negative for myalgias. Skin: Negative. Neurological: Negative. Endo/Heme/Allergies: Negative. Psychiatric/Behavioral: Negative for depression and memory loss. The patient does not have insomnia. Physical Exam   Constitutional: He is oriented to person, place, and time. He appears well-developed and well-nourished. HENT:   Head: Normocephalic. Eyes: Conjunctivae and EOM are normal. Pupils are equal, round, and reactive to light. Neck: Normal range of motion. Neck supple. No JVD present. No tracheal deviation present. Thyromegaly present. Cardiovascular: Normal rate, regular rhythm and normal heart sounds. Pulmonary/Chest: Effort normal and breath sounds normal.   Abdominal: Soft. Bowel sounds are normal.   Musculoskeletal: Normal range of motion. Lymphadenopathy:     He has no cervical adenopathy. Neurological: He is alert and oriented to person, place, and time. He has normal reflexes. Skin: Skin is warm.    Psychiatric: none     Diabetic foot exam: March 2018    Left: Reflexes nd     Vibratory sensation normal    Proprioception nd   Sharp/dull discrimination nd    Filament test reduced sensation with micro filament   Pulse DP: 2+ (normal)   Pulse PT: nd   Deformities: Yes - swollen foot  3 +  Right: Reflexes nd   Vibratory sensation normal   Proprioception nd   Sharp/dull discrimination nd   Filament test reduced sensation with micro filament   Pulse DP: 2+ (normal)   Pulse PT: nd   Deformities: Yes - swollen foot 2 +  RIGHTFOOT HAS A BLISTER       Lab Results   Component Value Date/Time    Hemoglobin A1c 9.6 (H) 08/23/2018 09:57 AM    Hemoglobin A1c 10.2 (H) 03/22/2018 01:37 PM    Hemoglobin A1c 8.9 (H) 11/22/2017 11:48 AM    Hemoglobin A1c, External 9.4 12/04/2015 11:36 AM    Glucose 132 (H) 08/23/2018 09:57 AM    Glucose (POC) 273 (H) 09/05/2017 11:16 AM    Microalbumin/Creat ratio (mg/g creat) 40 (H) 04/20/2010 01:00 PM    Microalb/Creat ratio (ug/mg creat.) 125.4 (H) 03/28/2018 12:00 AM    Microalbumin,urine random 6.54 04/20/2010 01:00 PM    LDL, calculated 66 08/23/2018 09:57 AM    Creatinine 1.22 08/23/2018 09:57 AM      Lab Results   Component Value Date/Time    Cholesterol, total 151 08/23/2018 09:57 AM    HDL Cholesterol 74 08/23/2018 09:57 AM    LDL, calculated 66 08/23/2018 09:57 AM    Triglyceride 54 08/23/2018 09:57 AM    CHOL/HDL Ratio 2.0 04/20/2010 01:00 PM       Lab Results   Component Value Date/Time    ALT (SGPT) 8 08/23/2018 09:57 AM    AST (SGOT) 15 08/23/2018 09:57 AM    Alk. phosphatase 78 08/23/2018 09:57 AM    Bilirubin, direct 0.1 04/20/2010 01:00 PM    Bilirubin, total 0.3 08/23/2018 09:57 AM       Lab Results   Component Value Date/Time    GFR est AA 66 08/23/2018 09:57 AM    GFR est non-AA 57 (L) 08/23/2018 09:57 AM    Creatinine 1.22 08/23/2018 09:57 AM    BUN 24 08/23/2018 09:57 AM    Sodium 141 08/23/2018 09:57 AM    Potassium 4.2 08/23/2018 09:57 AM    Chloride 100 08/23/2018 09:57 AM    CO2 27 08/23/2018 09:57 AM            ASSESSMENT and PLAN  1.  Type 2 DM poorly controlled : a1c is   9.6 %      FROM     AUGUST 2018     COMPARED TO  Over 10 %     From march 2018   Compared to  8.9 %     From nov 2017 Compared to   9 %     From     Aug 2017     comapred to  8.9 %    From   Today by finger stick   Compared to   11 %    From  April 2017    compared to   9.9 %   From march 2017      Reviewed the glucose log , noticing good improvement   Flucutation is expected with him as he behaves like type 1     safety is first   Explained to wife how important is that he gets insulin by carbs   Gave her a brief idea about the importance     Wife is told to add a unit  Or two units of humalog if pt eats more carbs per meal   She brought  meal plan that works for her for a week      She is advised to adjust insulins for barium swallow series     Continuing   on  Lantus in AM, stop Toujeo   Continuing  on humalog and very easy to follow sliding scale     Patient is advised to check blood sugars 4 times daily by rotation method. reviewed medications and side effects in detail  lab results and schedule of future lab studies reviewed with patient        2. Hypoglycemia :  Educated on treating the hypoglycemia. No inadvertant use of insulin, wife clarifies that he takes insulin whenever     3. HTN : continue cozaar 50 mg . Patient is educated about importance of compliance with anti-hypertensives especially ARB/ACEI    4. Dyslipidemia : continue Mevacor 40 mg hs . Patient is educated about benefits and adverse effects of statins and explained how benefits outweigh risk. 5. use of aspirin to prevent MI and TIA's discussed      6. Multi nodular goiter : Euthyroid   Thyroid usg - dec 2015 , Left side he has 3 nodules,  1.5 cm dominant nodule and then one mid level 1.2 cm and one 0.6 cm on upper pole   Right side upper pole it is 1.2 cm   He needs a f/u usg - ordered today       7. Anemia - pcp will follow   HGB 9 %       8.  Decreased Mental alertness  And suggesting eval to rule out dementia : f/u with neurologist   F/u with Radha French    9 s/p spinal stenosis surgery -  Maintenance of blood sugars is importance for good recovery 10. Bladder issues - on catheter   f/u with urologist , prostrate       11. Dysphagia occasionally       12. Sleep apnoea to be evaluated        13.  Bilateral swelling of feet  : left more than right   CHRONIC           > 50 % of time is spent on counseling   Patient voiced understanding her plan of care

## 2018-08-30 NOTE — MR AVS SNAPSHOT
[address] [facility] [address] [facility] [address] 
[phone] Patient: Karyna Gonzalez MRN: [id number] Visit Information Date & Time Provider Department Dept. Phone Encounter #  
 8/30/2018 10:45 AM Jenni Oakley MD [facility] [phone] [id number] Follow-up Instructions Return in about 4 months (around 12/30/2018). Your Appointments 8/30/2018 10:45 AM  
ROUTINE CARE with Jenni Oakley MD  
[facility]) Appt Note: F/U routine 3 months [address] [facility] [address] [facility] [address]  
[phone]  
  
   
 [address] [place] [address] Upcoming Health Maintenance Date Due DTaP/Tdap/Td series (1 - Tdap) 11/13/2011 FOOT EXAM Q1 11/23/2016 MEDICARE YEARLY EXAM 3/14/2018 Influenza Age 5 to Adult 8/1/2018 EYE EXAM RETINAL OR DILATED Q1 10/2/2018 Pneumococcal 65+ Low/Medium Risk (2 of 2 - PPSV23) 11/1/2018 HEMOGLOBIN A1C Q6M 2/23/2019 MICROALBUMIN Q1 3/28/2019 LIPID PANEL Q1 8/23/2019 GLAUCOMA SCREENING Q2Y 10/2/2019 COLONOSCOPY 8/1/2024 Allergies as of 8/30/2018  Review Complete On: 8/30/2018 By: Jenni Oakley MD  
  
 Severity Noted Reaction Type Reactions Ace Inhibitors  04/20/2010   Side Effect Cough Current Immunizations  Reviewed on 4/15/2011 Name Date Influenza High Dose Vaccine PF 11/16/2015 Influenza Vaccine 10/1/2014, 11/1/2013 Pneumococcal Vaccine (Unspecified Type) 11/1/2013 Td 11/12/2011 Not reviewed this visit You Were Diagnosed With   
  
 Codes Comments Uncontrolled type 2 diabetes mellitus with hyperglycemia, with long-term current use of insulin (HCC)    -  Primary ICD-10-CM: E11.65, Z79.4 ICD-9-CM: 250.02, V58.67 Essential hypertension     ICD-10-CM: I10 
ICD-9-CM: 401.9 Mixed hyperlipidemia     ICD-10-CM: E78.2 ICD-9-CM: 272.2 Benign prostatic hyperplasia with urinary obstruction     ICD-10-CM: N40.1, N13.8 ICD-9-CM: 600.01, 599.69 Vitals BP Pulse Temp Resp Height(growth percentile) Weight(growth percentile) 112/66 (BP 1 Location: Right arm, BP Patient Position: Sitting) 66 97 °F (36.1 °C) (Oral) 14 5' 7\" (1.702 m) 133 lb (60.3 kg) BMI Smoking Status 20.83 kg/m2 Never Smoker BMI and BSA Data Body Mass Index Body Surface Area  
 20.83 kg/m 2 1.69 m 2 Preferred Pharmacy Pharmacy Name Phone [facility][id number]- [address] AT [place] [address] [phone] Your Updated Medication List  
  
   
This list is accurate as of 8/30/18 10:39 AM.  Always use your most recent med list.  
  
  
  
  
 aspirin delayed-release 81 mg tablet Take  by mouth daily. * donepezil 5 mg tablet Commonly known as:  ARICEPT  
TAKE 1 TABLET EVERY DAY  
  
 * donepezil 10 mg tablet Commonly known as:  ARICEPT  
TAKE 1 TABLET BY MOUTH EVERY DAY  
  
 finasteride 5 mg tablet Commonly known as:  PROSCAR  
TAKE 1 TABLET BY MOUTH DAILY  
  
 folic acid 1 mg tablet Commonly known as:  Google Take 1 Tab by mouth daily. furosemide 40 mg tablet Commonly known as:  LASIX TAKE 1 TABLET BY MOUTH EVERY MORNING AS NEEDED FOR LEGS SWELLING  
  
 glucose blood VI test strips strip Commonly known as:  Ascensia CONTOUR Use as directed to test blood sugar 3 times daily - DX-Dm-250.00  
  
 insulin glargine 100 unit/mL (3 mL) Inpn Commonly known as:  Eather Wheeler Decrease to 12 units daily after breakfast.  
  
 insulin lispro 100 unit/mL kwikpen Commonly known as:  HUMALOG Increase to 5 units before breakfast, 3 units before lunch, and 3 units before dinner. Plus sliding scale Insulin Needles (Disposable) 29 gauge x 1/2\" Ndle Commonly known as:  PEN NEEDLE, DIABETIC  
 1 Package by Does Not Apply route two (2) times a day. Insulin Needles (Disposable) 32 gauge x 5/32\" Ndle Commonly known as:  Addis Pen Needle Use four times daily Dx Code E11.65  
  
 KLOR-CON PO Take  by mouth. Lancets Misc Contour lancets- test blood sugar 3 times daily Lancing Device Misc To use daily Dx Code E11.65  
  
 losartan 50 mg tablet Commonly known as:  COZAAR Take 1 Tab by mouth daily. lovastatin 40 mg tablet Commonly known as:  MEVACOR  
TAKE 1 TABLET NIGHTLY  
  
 memantine 10 mg tablet Commonly known as:  Bettey Jonas TAKE 1 TABLET BY MOUTH TWICE A DAY  
  
 metFORMIN 1,000 mg tablet Commonly known as:  GLUCOPHAGE Take 1 Tab by mouth two (2) times a day. omeprazole 40 mg capsule Commonly known as:  PRILOSEC  
TAKE 1 CAPSULE BY MOUTH EVERYDAY  
  
 tamsulosin 0.4 mg capsule Commonly known as:  FLOMAX Take 1 Cap by mouth nightly. timolol 0.5 % ophthalmic solution Commonly known as:  TIMOPTIC  
  
 traMADol 50 mg tablet Commonly known as:  ULTRAM  
Take 1 Tab by mouth every six (6) hours as needed. Max Daily Amount: 200 mg. VITAMIN D3 1,000 unit Cap Generic drug:  cholecalciferol Take  by mouth daily. * Notice: This list has 2 medication(s) that are the same as other medications prescribed for you. Read the directions carefully, and ask your doctor or other care provider to review them with you. We Performed the Following REFERRAL TO UROLOGY [XZQ130 Custom] Comments:  
 PT SEEKS TO CHANGE CARE FROM HIS CURRENT UROLOGIST Follow-up Instructions Return in about 4 months (around 12/30/2018). Referral Information Referral ID Referred By Referred To  
  
 [id number] Sydnie Darnell Not Available Visits Status Start Date End Date 1 New Request 8/30/18 8/30/19  If your referral has a status of pending review or denied, additional information will be sent to support the outcome of this decision. Patient Instructions   
-------------------------------------------------------------------------------------------- Refills    -    please call your pharmacy and have them send us a refill request 
 
Results  -  allow up to a week for lab results to be processed and reviewed. Phone calls  -  Allow upto 24 hrs. for non-urgent calls to be retained Prior authorization - It may take up to 2 weeks to process, depending on your insurance Forms  -  FMLA, DMV, patient assistance, etc. will take up to 2 weeks to process Cancellations - please notify the office in advance if you cannot keep your appointment Samples  - will only be dispensed at visits as supply is limited If you are having a medical emergency call 911 
 
-------------------------------------------------------------------------------------------- 
 
 
45 gm of carbs per day Stay on metformin 1000 mg twice a day with meals Check blood sugars immediately before each meal and at bedtime LANTUS  insulin  12   units  After  b-fast  
 
 Humalog  insulin  To 5    units before breakfast, 3 units before lunch and 3 units before dinner  When sugars are between 90 to 150 mg Pt wife to bring in a meal plan made by her from home ( to get the count of carbs and consistencies ) 45 gm per meal  
 
 
Add   humalog  as follows with meals  If blood sugars are[de-identified] 
 
150-200 mg 1 units 201-250 mg 2 units 251-300 mg 3 units 301-350 mg 4 units 351-400 mg 5 units 401-450 mg 6 units 451-500 mg 7 units Less than 90 mg NO INSULIN 
----------------------------------------------------------------------------------------- Introducing [facility] & [facility] SERVICES! [facility] introduces [employer] patient portal. Now you can access parts of your medical record, email your doctor's office, and request medication refills online. 1. In your internet browser, go to [email or web address] 2. Click on the First Time User? Click Here link in the Sign In box. You will see the New Member Sign Up page. 3. Enter your [employer] Access Code exactly as it appears below. You will not need to use this code after youve completed the sign-up process. If you do not sign up before the expiration date, you must request a new code. · [employer] Access Code: [email or web address] Expires: 11/28/2018 10:38 AM 
 
4. Enter the last four digits of your Social Security Number (xxxx) and Date of Birth (mm/dd/yyyy) as indicated and click Submit. You will be taken to the next sign-up page. 5. Create a [employer] ID. This will be your [employer] login ID and cannot be changed, so think of one that is secure and easy to remember. 6. Create a [employer] password. You can change your password at any time. 7. Enter your Password Reset Question and Answer. This can be used at a later time if you forget your password. 8. Enter your e-mail address. You will receive e-mail notification when new information is available in [address] 9. Click Sign Up. You can now view and download portions of your medical record. 10. Click the Download Summary menu link to download a portable copy of your medical information. If you have questions, please visit the Frequently Asked Questions section of the [employer] website. Remember, [employer] is NOT to be used for urgent needs. For medical emergencies, dial 911. Now available from your iPhone and Android! Please provide this summary of care documentation to your next provider. Your primary care clinician is listed as Tc Kelsey. If you have any questions after today's visit, please call [phone].

## 2018-10-23 NOTE — TELEPHONE ENCOUNTER
----- Message from Nafisa Lorenzana sent at 10/23/2018  3:53 PM EDT -----  Regarding: FW: Dr. Kathryn Briscoe: [phone]   Not sure this message was forwarded to the nurse. Don't see where  called  ----- Message -----  From: Akhil Gravse  Sent: 10/18/2018  12:24 PM  To: [address] [place] [address]  Subject: Dr. Flores Knows                            Patient's wife [place], is returning a call back from the practice. Mrs. Mariluz Garrett best contact number is [phone].

## 2018-10-23 NOTE — TELEPHONE ENCOUNTER
Call placed to wife [place]. Wife states patient has other health issues going on right now.  Wife states she will contact office when she is ready for Urologist referral.

## 2018-12-19 NOTE — PATIENT INSTRUCTIONS
--------------------------------------------------------------------------------------------    Refills    -    please call your pharmacy and have them send us a refill request    Results  -  allow up to a week for lab results to be processed and reviewed. Phone calls  -  Allow upto 24 hrs.  for non-urgent calls to be retained    Prior authorization - It may take up to 2 weeks to process, depending on your insurance    Forms  -  FMLA, DMV, patient assistance, etc. will take up to 2 weeks to process    Cancellations - please notify the office in advance if you cannot keep your appointment    Samples  - will only be dispensed at visits as supply is limited      If you are having a medical emergency call 911    --------------------------------------------------------------------------------------------      45 gm of carbs per day       Stay on metformin 1000 mg twice a day with meals         Check blood sugars immediately before each meal and at bedtime        LANTUS  insulin  12   units  After  b-fast      Humalog  insulin   5    units before breakfast, 3 units before lunch and 3 units before dinner  When sugars are between 90 to 150 mg     Pt wife to bring in a meal plan made by her from home ( to get the count of carbs and consistencies )      45 gm per meal       Add   humalog  as follows with meals  If blood sugars are[de-identified]    150-200 mg 1 units    201-250 mg 2 units    251-300 mg 3 units    301-350 mg 4 units    351-400 mg 5 units    401-450 mg 6 units    451-500 mg 7 units     Less than 90 mg NO INSULIN  -----------------------------------------------------------------------------------------

## 2018-12-19 NOTE — PROGRESS NOTES
1. Have you been to the ER, urgent care clinic since your last visit? Yes/[facility]/Diarrhea/Cellulitis Hospitalized since your last visit? Yes/December 3-8, 2018    2. Have you seen or consulted any other health care providers outside of the [address] [place] [address] since your last visit? Include any pap smears or colon screening. No     Wt Readings from Last 3 Encounters:   12/19/18 126 lb 12.8 oz (57.5 kg)   08/30/18 133 lb (60.3 kg)   05/10/18 133 lb 4.8 oz (60.5 kg)     Temp Readings from Last 3 Encounters:   12/19/18 95.4 °F (35.2 °C) (Oral)   08/30/18 97 °F (36.1 °C) (Oral)   05/10/18 97.6 °F (36.4 °C) (Oral)     BP Readings from Last 3 Encounters:   12/19/18 179/81   08/30/18 112/66   05/10/18 106/59     Pulse Readings from Last 3 Encounters:   12/19/18 78   08/30/18 66   05/10/18 64     Lab Results   Component Value Date/Time    Hemoglobin A1c 9.6 (H) 08/23/2018 09:57 AM    Hemoglobin A1c (POC) 8.9 06/07/2017 04:31 PM    Hemoglobin A1c, External 9.4 12/04/2015 11:36 AM     Patient has meter today.

## 2018-12-19 NOTE — PROGRESS NOTES
HISTORY OF PRESENT ILLNESS  Amara Rasmussen is a 70 y.o. male. HPI   f/u visit after last visit for DM  2  Management   From August 2018    accompanied by wife   She got the log       He got hospitalized for cellulitis and diarrhea   He is diagnosed with rectal/anal ulcer and he has multiple polyps       Pt is demented         Old history :    Interestingly, he has high sugars in the past one week   Noticed a blister on right foot           Old history :  S/p surgery for prostrate surgery -  Jan 2018     Had very fluctuant sugars   Some high sugars from eating snacks in between meals     no low blood sugar   He is here with log , food as well as glucose       Wife is helpful in providing the info           Prior history   Referred : by self    H/o diabetes for several  years   Accompanied by wife who is providing the history     Pt in general has drowsiness,   Per WIFE   He was given elavil 50 mg the night before the hospitalization  Current A1C is 9.9 % and symptoms/problems include polyuria, polydipsia and visual disturbances     Current diabetic medications include metformin. When discharged from hospital  On feb 16 2017  , he was told to take only Metformin 1 tab bid   He was told to not to take Toujeo  And jardiance     Current monitoring regimen: home blood tests - 3-4 times a day   Home blood sugar records: trend: fluctuating a lot  Any episodes of hypoglycemia?  yes - multiple soon after Toujeo is taken   He has taken 40 units     Weight trend: stable  Prior visit with dietician: no  Current diet: \"unhealthy\" diet in general  Current exercise: no regular exercise    Known diabetic complications: peripheral neuropathy and cerebrovascular disease  Cardiovascular risk factors: dyslipidemia, diabetes mellitus, obesity, male gender, hypertension, stress    Eye exam current (within one year): yes  LUCIANA: no     Past Medical History:   Diagnosis Date    Cataract     left eye cataract surg    Chronic pain     copd     error    Diabetes ([facility]     Hammer toe     HLD (hyperlipidemia) 4/22/2010    Hypertension     Other ill-defined conditions(799.89)     CLULITIS BILATERAL  FEET    Other ill-defined conditions(799.89)     BILATERAL GLAUCOMA     Past Surgical History:   Procedure Laterality Date    HX CATARACT REMOVAL Bilateral     HX GI      COLOONSCOPY    HX ORTHOPAEDIC  2012    LOWER BACK SURGERY    HX OTHER SURGICAL      CYSTS LT ARM FORHEAD AND RT UPPER QUADRANT    HX UVULOPALATOPHARYNGOPLASTY       Current Outpatient Medications   Medication Sig    cephALEXin (KEFLEX) 500 mg capsule Take 500 mg by mouth two (2) times a day. Take 1 tab every 12 hours for 10 days    timolol (TIMOPTIC) 0.5 % ophthalmic solution     potassium chloride (KLOR-CON PO) Take  by mouth.  insulin lispro (HUMALOG) 100 unit/mL kwikpen Increase to 5 units before breakfast, 3 units before lunch, and 3 units before dinner. Plus sliding scale (Patient taking differently: Increase to 6 units before breakfast, 4 units before lunch, and 4 units before dinner. Plus sliding scale)    furosemide (LASIX) 40 mg tablet TAKE 1 TABLET BY MOUTH EVERY MORNING AS NEEDED FOR LEGS SWELLING    memantine (NAMENDA) 10 mg tablet TAKE 1 TABLET BY MOUTH TWICE A DAY    donepezil (ARICEPT) 10 mg tablet TAKE 1 TABLET BY MOUTH EVERY DAY    finasteride (PROSCAR) 5 mg tablet TAKE 1 TABLET BY MOUTH DAILY    insulin glargine (LANTUS,BASAGLAR) 100 unit/mL (3 mL) inpn Decrease to 12 units daily after breakfast. (Patient taking differently: Decrease to 10 units daily after breakfast.)    Insulin Needles, Disposable, (GILLIAN PEN NEEDLE) 32 gauge x 5/32\" ndle Use four times daily Dx Code E11.65    traMADol (ULTRAM) 50 mg tablet Take 1 Tab by mouth every six (6) hours as needed. Max Daily Amount: 200 mg.  cholecalciferol (VITAMIN D3) 1,000 unit cap Take  by mouth daily.  aspirin delayed-release 81 mg tablet Take  by mouth daily.     omeprazole (PRILOSEC) 40 mg capsule TAKE 1 CAPSULE BY MOUTH EVERYDAY    Lancing Device misc To use daily Dx Code E11.65    tamsulosin (FLOMAX) 0.4 mg capsule Take 1 Cap by mouth nightly.  lovastatin (MEVACOR) 40 mg tablet TAKE 1 TABLET NIGHTLY    losartan (COZAAR) 50 mg tablet Take 1 Tab by mouth daily.  folic acid (FOLVITE) 1 mg tablet Take 1 Tab by mouth daily.  metFORMIN (GLUCOPHAGE) 1,000 mg tablet Take 1 Tab by mouth two (2) times a day.  glucose blood VI test strips (ASCENSIA CONTOUR) strip Use as directed to test blood sugar 3 times daily - DX-Dm-250.00    Lancets misc Contour lancets- test blood sugar 3 times daily    Insulin Needles, Disposable, (INSULIN PEN NEEDLE) 29 x 1/2 \" ndle 1 Package by Does Not Apply route two (2) times a day.  ciprofloxacin HCl (CIPRO) 500 mg tablet ciprofloxacin 500 mg tablet    donepezil (ARICEPT) 5 mg tablet TAKE 1 TABLET EVERY DAY     No current facility-administered medications for this visit. Review of Systems   Constitutional: Negative. HENT:        He does have swallowing difficulties    Eyes: Negative for pain and redness. Respiratory: Negative. Cardiovascular: Negative for chest pain, palpitations and leg swelling. Gastrointestinal: Negative. Negative for constipation. Genitourinary: Negative. Musculoskeletal: Negative for myalgias. Skin: Negative. Neurological: Negative. Endo/Heme/Allergies: Negative. Psychiatric/Behavioral: Negative for depression and memory loss. The patient does not have insomnia. Physical Exam   Constitutional: He is oriented to person, place, and time. He appears well-developed and well-nourished. HENT:   Head: Normocephalic. Eyes: Conjunctivae and EOM are normal. Pupils are equal, round, and reactive to light. Neck: Normal range of motion. Neck supple. No JVD present. No tracheal deviation present. Thyromegaly present. Cardiovascular: Normal rate, regular rhythm and normal heart sounds.     Pulmonary/Chest: Effort normal and breath sounds normal.   Abdominal: Soft. Bowel sounds are normal.   Musculoskeletal: Normal range of motion. Lymphadenopathy:     He has no cervical adenopathy. Neurological: He is alert and oriented to person, place, and time. He has normal reflexes. Skin: Skin is warm. Psychiatric: none     Diabetic foot exam: March 2018    Left: Reflexes nd     Vibratory sensation normal    Proprioception nd   Sharp/dull discrimination nd    Filament test reduced sensation with micro filament   Pulse DP: 2+ (normal)   Pulse PT: nd   Deformities: Yes - swollen foot  3 +  Right: Reflexes nd   Vibratory sensation normal   Proprioception nd   Sharp/dull discrimination nd   Filament test reduced sensation with micro filament   Pulse DP: 2+ (normal)   Pulse PT: nd   Deformities: Yes - swollen foot 2 +  RIGHTFOOT HAS A BLISTER       Lab Results   Component Value Date/Time    Hemoglobin A1c 9.6 (H) 08/23/2018 09:57 AM    Hemoglobin A1c 10.2 (H) 03/22/2018 01:37 PM    Hemoglobin A1c 8.9 (H) 11/22/2017 11:48 AM    Hemoglobin A1c, External 9.4 12/04/2015 11:36 AM    Glucose 132 (H) 08/23/2018 09:57 AM    Glucose (POC) 273 (H) 09/05/2017 11:16 AM    Microalbumin/Creat ratio (mg/g creat) 40 (H) 04/20/2010 01:00 PM    Microalb/Creat ratio (ug/mg creat.) 125.4 (H) 03/28/2018 12:00 AM    Microalbumin,urine random 6.54 04/20/2010 01:00 PM    LDL, calculated 66 08/23/2018 09:57 AM    Creatinine 1.22 08/23/2018 09:57 AM      Lab Results   Component Value Date/Time    Cholesterol, total 151 08/23/2018 09:57 AM    HDL Cholesterol 74 08/23/2018 09:57 AM    LDL, calculated 66 08/23/2018 09:57 AM    Triglyceride 54 08/23/2018 09:57 AM    CHOL/HDL Ratio 2.0 04/20/2010 01:00 PM       Lab Results   Component Value Date/Time    ALT (SGPT) 8 08/23/2018 09:57 AM    AST (SGOT) 15 08/23/2018 09:57 AM    Alk.  phosphatase 78 08/23/2018 09:57 AM    Bilirubin, direct 0.1 04/20/2010 01:00 PM    Bilirubin, total 0.3 08/23/2018 09:57 AM Lab Results   Component Value Date/Time    GFR est AA 66 08/23/2018 09:57 AM    GFR est non-AA 57 (L) 08/23/2018 09:57 AM    Creatinine 1.22 08/23/2018 09:57 AM    BUN 24 08/23/2018 09:57 AM    Sodium 141 08/23/2018 09:57 AM    Potassium 4.2 08/23/2018 09:57 AM    Chloride 100 08/23/2018 09:57 AM    CO2 27 08/23/2018 09:57 AM            ASSESSMENT and PLAN  1. Type 2 DM poorly controlled : a1c is  8.3 %    From hospital    Compared to    9.6 %      FROM     AUGUST 2018     COMPARED TO  Over 10 %     From march 2018   Compared to  8.9 %     From nov 2017   Compared to   9 %     From     Aug 2017     comapred to  8.9 %    From   Today by finger stick   Compared to   11 %    From  April 2017    compared to   9.9 %   From march 2017      Reviewed the glucose log , noticing good improvement   Fluctuation is expected with him as he behaves like type 1     safety is first   Explained to wife how important is that he gets insulin by carbs   Gave her a brief idea about the importance     Wife is told to add a unit  Or two units of humalog if pt eats more carbs per meal   She brought  meal plan that works for her for a week      She is advised to adjust insulins for barium swallow series     Continuing   on  Lantus in AM, stop Toujeo   Continuing  on humalog and very easy to follow sliding scale     Patient is advised to check blood sugars 4 times daily by rotation method. reviewed medications and side effects in detail  lab results and schedule of future lab studies reviewed with patient        2. Hypoglycemia :  Educated on treating the hypoglycemia. No inadvertant use of insulin, wife clarifies that he takes insulin whenever         3. HTN : continue cozaar 50 mg . Patient is educated about importance of compliance with anti-hypertensives especially ARB/ACEI        4. Dyslipidemia : continue Mevacor 40 mg hs .  Patient is educated about benefits and adverse effects of statins and explained how benefits outweigh risk.        5.  Multi nodular goiter : Euthyroid   Thyroid usg - dec 2015 , Left side he has 3 nodules,  1.5 cm dominant nodule and then one mid level 1.2 cm and one 0.6 cm on upper pole   Right side upper pole it is 1.2 cm   He needs a f/u usg - ordered today       7. Anemia - pcp will follow   HGB 9 %   Rectal ulcer       8.  s/p spinal stenosis surgery -  Maintenance of blood sugars is importance for good recovery       9. Dysphagia occasionally - he is under care of speech therapist   Thickened liquids       10.   Dementia - on meds         > 50 % of time is spent on counseling   Patient voiced understanding her plan of care

## 2019-01-01 ENCOUNTER — ED HISTORICAL/CONVERTED ENCOUNTER (OUTPATIENT)
Dept: OTHER | Age: 79
End: 2019-01-01

## 2019-01-01 ENCOUNTER — OP HISTORICAL/CONVERTED ENCOUNTER (OUTPATIENT)
Dept: OTHER | Age: 79
End: 2019-01-01

## 2022-07-04 NOTE — DIABETES MGMT
DTC Progress Note    Recommendations/ Comments: Spoke with NP regarding insulin orders. Plan is to add Lantus 10 units and change correction insulin from Regular to Humalog. DTC will continue to follow. Chart reviewed on [facility]. Patient is a 78 y.o. male with a history of diabetes on intensive insulin injection program (Lantus 10 units and Humalog 3 units tid ac) at home. A1c:   Lab Results   Component Value Date/Time    Hemoglobin A1c 9.0 08/11/2017 12:04 PM    Hemoglobin A1c 11.0 04/21/2017 02:37 PM    Hemoglobin A1c, External 9.4 12/04/2015 11:36 AM       Recent Glucose Results:   Lab Results   Component Value Date/Time    GLUCPOC 207 (H) 08/31/2017 12:03 PM    GLUCPOC 173 (H) 08/31/2017 07:16 AM        Lab Results   Component Value Date/Time    Creatinine 1.01 08/11/2017 12:04 PM     Estimated Creatinine Clearance: 55.2 mL/min (based on Cr of 1.01). Active Orders   Diet    DIET CLEAR LIQUID        PO intake: No data found. Current hospital DM medication: Regular insulin for correction, normal sensitivity    Will continue to follow as needed.     Thank you  Giovana Van, MS, RN, CDE Home

## (undated) DEVICE — DERMABOND SKIN ADH 0.7ML -- DERMABOND ADVANCED 12/BX

## (undated) DEVICE — INFECTION CONTROL KIT SYS

## (undated) DEVICE — SUTURE VCRL 2-0 L27IN ABSRB UD CP-2 L26MM 1/2 CIR REV CUT J869H

## (undated) DEVICE — STERILE SLEEVE: Brand: CONVERTORS

## (undated) DEVICE — Z CONVERTED USE 2271043 CONTAINER SPEC COLL 4OZ SCR ON LID PEEL PCH

## (undated) DEVICE — SURGERY KT MINIMALLY INVASIVE

## (undated) DEVICE — NEEDLE HYPO 22GA L1.5IN BLK POLYPR HUB S STL REG BVL STR

## (undated) DEVICE — BLADE ELECTRODE: Brand: EDGE

## (undated) DEVICE — COVER,TABLE,HEAVY DUTY,60"X90",STRL: Brand: MEDLINE

## (undated) DEVICE — Device

## (undated) DEVICE — KIT CLMP DISPOSABLE

## (undated) DEVICE — CATH FOL TY IC BAG 16FR 2000ML -- CONVERT TO ITEM 363158

## (undated) DEVICE — GAUZE SPONGES,12 PLY: Brand: CURITY

## (undated) DEVICE — DRAPE C-ARMOUR C-ARM KIT --

## (undated) DEVICE — DRAPE SURG W41XL74IN CLR FULL SZ C ARM 3 ADH POLY STRP E

## (undated) DEVICE — BONE MARROW KIT ASPIR 11 GA

## (undated) DEVICE — TOOL 14MH30 LEGEND 14CM 3MM: Brand: MIDAS REX ™

## (undated) DEVICE — KENDALL SCD EXPRESS SLEEVES, KNEE LENGTH, MEDIUM: Brand: KENDALL SCD

## (undated) DEVICE — NDL SPNE QNCKE 18GX3.5IN LF --

## (undated) DEVICE — DRAIN KT WND 10FR RND 400ML --

## (undated) DEVICE — PREP KIT PEEL PTCH POVIDONE IOD

## (undated) DEVICE — X-RAY SPONGES,16 PLY: Brand: DERMACEA

## (undated) DEVICE — PREP SKN PREVAIL 40ML APPL --

## (undated) DEVICE — STERILE POLYISOPRENE POWDER-FREE SURGICAL GLOVES WITH EMOLLIENT COATING: Brand: PROTEXIS

## (undated) DEVICE — 4-PORT MANIFOLD: Brand: NEPTUNE 2

## (undated) DEVICE — SUTURE VCRL SZ 3-0 L27IN ABSRB UD L24MM PS-1 3/8 CIR PRIM J936H

## (undated) DEVICE — BIPOLAR FORCEPS CORD,BANANA LEADS: Brand: VALLEYLAB

## (undated) DEVICE — LAMINECTOMY RICHMOND-LF: Brand: MEDLINE INDUSTRIES, INC.

## (undated) DEVICE — HANDLE LT SNAP ON ULT DURABLE LENS FOR TRUMPF ALC DISPOSABLE

## (undated) DEVICE — PILLOW POS AD L7IN R FOAM HD REST INTUB SLOT DISP

## (undated) DEVICE — BONE WAX WHITE: Brand: BONE WAX WHITE

## (undated) DEVICE — SUTURE VCRL 1 L27IN ABSRB VLT TP-1 L65MM 1/2 CIR TAPERPOINT J650G

## (undated) DEVICE — KWIRE

## (undated) DEVICE — SOLUTION IV 1000ML 0.9% SOD CHL